# Patient Record
Sex: FEMALE | Race: WHITE | NOT HISPANIC OR LATINO | Employment: OTHER | URBAN - METROPOLITAN AREA
[De-identification: names, ages, dates, MRNs, and addresses within clinical notes are randomized per-mention and may not be internally consistent; named-entity substitution may affect disease eponyms.]

---

## 2018-03-15 ENCOUNTER — OFFICE VISIT (OUTPATIENT)
Dept: URGENT CARE | Age: 68
End: 2018-03-15
Payer: MEDICARE

## 2018-03-15 VITALS
RESPIRATION RATE: 20 BRPM | OXYGEN SATURATION: 97 % | WEIGHT: 153 LBS | HEART RATE: 67 BPM | HEIGHT: 64 IN | DIASTOLIC BLOOD PRESSURE: 79 MMHG | TEMPERATURE: 98.1 F | SYSTOLIC BLOOD PRESSURE: 168 MMHG | BODY MASS INDEX: 26.12 KG/M2

## 2018-03-15 DIAGNOSIS — J30.9 ACUTE ALLERGIC RHINITIS, UNSPECIFIED SEASONALITY, UNSPECIFIED TRIGGER: ICD-10-CM

## 2018-03-15 DIAGNOSIS — J20.9 ACUTE BRONCHITIS, UNSPECIFIED ORGANISM: ICD-10-CM

## 2018-03-15 DIAGNOSIS — J01.00 ACUTE NON-RECURRENT MAXILLARY SINUSITIS: Primary | ICD-10-CM

## 2018-03-15 PROCEDURE — G0463 HOSPITAL OUTPT CLINIC VISIT: HCPCS | Performed by: FAMILY MEDICINE

## 2018-03-15 PROCEDURE — 99213 OFFICE O/P EST LOW 20 MIN: CPT | Performed by: FAMILY MEDICINE

## 2018-03-15 RX ORDER — DOXYCYCLINE 100 MG/1
100 TABLET ORAL 2 TIMES DAILY
Qty: 14 TABLET | Refills: 0 | Status: SHIPPED | OUTPATIENT
Start: 2018-03-15 | End: 2018-03-22

## 2018-03-15 RX ORDER — MEGESTROL ACETATE 40 MG/ML
80 SUSPENSION ORAL
COMMUNITY
End: 2018-03-15

## 2018-03-15 RX ORDER — HYDROCHLOROTHIAZIDE 12.5 MG/1
TABLET ORAL
COMMUNITY
End: 2018-03-15

## 2018-03-15 RX ORDER — METOPROLOL SUCCINATE 25 MG/1
25 TABLET, EXTENDED RELEASE ORAL 2 TIMES DAILY
COMMUNITY
Start: 2018-02-20

## 2018-03-15 NOTE — PATIENT INSTRUCTIONS
Take antibiotic as directed until completed  Take antibiotic with food and full glass of water  Take a probiotic while taking this medication  Begin using an antihistamine such as Claritin, and a nasal steroid like Flonase  Tylenol as directed for muscle aches  Use cool mist humidifier, turning on hours prior to bedtime for maximum relief  Take all medications with food and a full glass of water  Get plenty of rest and lots of fluids  Use throat lozenges, saltwater gargle, and warm honey water as needed for throat relief  If symptoms worsen or if not resolving, call PCP or go to the Er  Allergic Rhinitis   WHAT YOU NEED TO KNOW:   Allergic rhinitis, or hay fever, is swelling of the inside of your nose  The swelling is a reaction to allergens in the air  An allergen can be anything that causes an allergic reaction  Allergies to weeds, grass, trees, or mold often cause seasonal allergic rhinitis  Indoor dust mites, cockroaches, pet dander, or mold can also cause allergic rhinitis  DISCHARGE INSTRUCTIONS:   Call 911 for the following:   · You have chest pain or shortness of breath  Return to the emergency department if:   · You have severe pain  · You cough up blood  Contact your healthcare provider if:   · You have a fever  · You have ear or sinus pain, or a headache  · Your symptoms get worse, even after treatment  · You have yellow, green, brown, or bloody mucus coming from your nose  · Your nose is bleeding or you have pain inside your nose  · You have trouble sleeping because of your symptoms  · You have questions or concerns about your condition or care  Medicines:   · Medicines  help decrease your symptoms and clear your stuffy nose  · Take your medicine as directed  Contact your healthcare provider if you think your medicine is not helping or if you have side effects  Tell him of her if you are allergic to any medicine   Keep a list of the medicines, vitamins, and herbs you take  Include the amounts, and when and why you take them  Bring the list or the pill bottles to follow-up visits  Carry your medicine list with you in case of an emergency  How to manage allergic rhinitis:  The best way to manage allergic rhinitis is to avoid allergens that can trigger your symptoms  Any of the following may help decrease your symptoms:  · Rinse your nose and sinuses  with a salt water solution or use a salt water nasal spray  This will help thin the mucus in your nose and rinse away pollen and dirt  It will also help reduce swelling so you can breathe normally  Ask your healthcare provider how often to rinse your nose  · Reduce exposure to dust mites  Wash sheets and towels in hot water every week  Cover your pillows and mattresses with allergen-free covers  Limit the number of stuffed animals and soft toys your child has  Wash your child's toys in hot water regularly  Vacuum weekly and use a vacuum  with an air filter  If possible, get rid of carpets and curtains  These collect dust and dust mites  · Reduce exposure to pollen  Keep windows and doors closed in your house and car  Stay inside when air pollution or the pollen count is high  Run your air conditioner on recycle, and change air filters often  Shower and wash your hair before bed every night to rinse away pollen  · Reduce exposure to pet dander  If possible, do not keep cats, dogs, birds, or other pets  If you do keep pets in your home, keep them out of bedrooms and carpeted rooms  Bathe them often  · Reduce exposure to mold  Do not spend time in basements  Choose artificial plants instead of live plants  Keep your home's humidity at less than 45%  Do not have ponds or standing water in your home or yard  · Do not smoke  Avoid others who smoke  Ask your healthcare provider for information if you currently smoke and need help to quit  Follow up with your healthcare provider as directed:   You may need to see an allergist often to control your symptoms  Write down your questions so you remember to ask them during your visits  © 2017 [employer] Information is for End User's use only and may not be sold, redistributed or otherwise used for commercial purposes  All illustrations and images included in CareNotes® are the copyrighted property of A D A M [employer] Inc  or Matt Chery  The above information is an  only  It is not intended as medical advice for individual conditions or treatments  Talk to your doctor, nurse or pharmacist before following any medical regimen to see if it is safe and effective for you

## 2018-03-15 NOTE — PROGRESS NOTES
[facility]      NAME: Bertin Lance is a 69 y o  female  : 1950    MRN: [id number]  DATE: March 15, 2018  TIME: 11:28 AM    Assessment and Plan   Acute non-recurrent maxillary sinusitis [J01 00]  1  Acute non-recurrent maxillary sinusitis  doxycycline (ADOXA) 100 MG tablet   2  Acute allergic rhinitis, unspecified seasonality, unspecified trigger     3  Acute bronchitis, unspecified organism       Patient Instructions   Take antibiotic as directed until completed  Take antibiotic with food and full glass of water  Take a probiotic while taking this medication  Begin using an antihistamine such as Claritin, and a nasal steroid like Flonase  Tylenol as directed for muscle aches  Use cool mist humidifier, turning on hours prior to bedtime for maximum relief  Take all medications with food and a full glass of water  Get plenty of rest and lots of fluids  Use throat lozenges, saltwater gargle, and warm honey water as needed for throat relief  If symptoms worsen or if not resolving, call PCP or go to the Er  Follow up with PCP in 3-5 days  Proceed to  ER if symptoms worsen  Chief Complaint     Chief Complaint   Patient presents with    Cold Like Symptoms     x 5 days ears clogged,  cough, sore throat         History of Present Illness   68 y/o female with c/o dry cough x 5 days  Symptoms associated with ear pressure, worse on the left side, nasal congestion, clear nasal drainage, PND, itchy throat, post tussive sore throat, and watery eyes  Treated initially with claritin, with minimal relief  Advil and throat lozenges provide relief of sore throat and itching  Possible trigger includes 2 new cats and 4 kittens  She has had one cat previously without allergies, however, this was many years ago  She notes she feels like this is more than just allergies as symptoms have been progressively worsening over the past 5 days    sick with similar symptoms, treated for allergies and ear infection  No recent travel  No recent antibiotic use  Review of Systems   Review of Systems   Constitutional: Negative for chills, fatigue and fever  HENT: Positive for sneezing  Eyes: Negative for redness and itching  Respiratory: Negative for shortness of breath and wheezing  Cardiovascular: Negative for chest pain and palpitations  Gastrointestinal: Negative for abdominal pain, diarrhea, nausea and vomiting  Musculoskeletal: Negative for myalgias  Skin: Negative for rash  Current Medications       Current Outpatient Prescriptions:     metoprolol succinate (TOPROL-XL) 25 mg 24 hr tablet, Take 25 mg by mouth, Disp: , Rfl:     doxycycline (ADOXA) 100 MG tablet, Take 1 tablet (100 mg total) by mouth 2 (two) times a day for 7 days, Disp: 14 tablet, Rfl: 0    Current Allergies     Allergies as of 03/15/2018 - Reviewed 03/15/2018   Allergen Reaction Noted    Cefixime  09/23/2013    Sulfa antibiotics Other (See Comments) 08/03/2009          The following portions of the patient's history were reviewed and updated as appropriate: allergies, current medications, past family history, past medical history, past social history, past surgical history and problem list      Past Medical History:   Diagnosis Date    Hypertension     Lyme disease        Past Surgical History:   Procedure Laterality Date    HYSTERECTOMY       No family history on file  Medications have been verified  Objective   /79   Pulse 67   Temp 98 1 °F (36 7 °C) (Temporal)   Resp 20   Ht 5' 4" (1 626 m)   Wt 69 4 kg (153 lb)   SpO2 97%   BMI 26 26 kg/m²      Physical Exam     Physical Exam   Constitutional: She is oriented to person, place, and time  She appears well-developed and well-nourished  No distress  HENT:   Head: Normocephalic and atraumatic     Right Ear: Hearing, tympanic membrane, external ear and ear canal normal    Left Ear: Hearing, external ear and ear canal normal  Tympanic membrane is injected  A middle ear effusion is present  Nose: Rhinorrhea present  Mouth/Throat: Uvula is midline and mucous membranes are normal  No oropharyngeal exudate, posterior oropharyngeal edema or posterior oropharyngeal erythema  Eyes: Right eye exhibits no discharge  Left eye exhibits no discharge  Mild palpebral conjunctival irritation b/l  Neck: Neck supple  Cardiovascular: Normal rate, regular rhythm and normal heart sounds  Pulmonary/Chest: Effort normal  No respiratory distress  She has no decreased breath sounds  She has no wheezes  She has no rhonchi  She has no rales  Breath sounds coarse throughout b/l lung fields  Lymphadenopathy:     She has no cervical adenopathy  Neurological: She is alert and oriented to person, place, and time  She has normal reflexes  No cranial nerve deficit  She exhibits normal muscle tone  Coordination normal    Skin: Skin is warm and dry  No rash noted  She is not diaphoretic  Psychiatric: She has a normal mood and affect  Her behavior is normal    Nursing note and vitals reviewed

## 2021-02-24 ENCOUNTER — IMMUNIZATIONS (OUTPATIENT)
Dept: FAMILY MEDICINE CLINIC | Facility: HOSPITAL | Age: 71
End: 2021-02-24

## 2021-02-24 DIAGNOSIS — Z23 ENCOUNTER FOR IMMUNIZATION: Primary | ICD-10-CM

## 2021-02-24 PROCEDURE — 0001A SARS-COV-2 / COVID-19 MRNA VACCINE (PFIZER-BIONTECH) 30 MCG: CPT

## 2021-02-24 PROCEDURE — 91300 SARS-COV-2 / COVID-19 MRNA VACCINE (PFIZER-BIONTECH) 30 MCG: CPT

## 2021-03-18 ENCOUNTER — IMMUNIZATIONS (OUTPATIENT)
Dept: FAMILY MEDICINE CLINIC | Facility: HOSPITAL | Age: 71
End: 2021-03-18

## 2021-03-18 DIAGNOSIS — Z23 ENCOUNTER FOR IMMUNIZATION: Primary | ICD-10-CM

## 2021-03-18 PROCEDURE — 91300 SARS-COV-2 / COVID-19 MRNA VACCINE (PFIZER-BIONTECH) 30 MCG: CPT

## 2021-03-18 PROCEDURE — 0002A SARS-COV-2 / COVID-19 MRNA VACCINE (PFIZER-BIONTECH) 30 MCG: CPT

## 2021-10-15 ENCOUNTER — OFFICE VISIT (OUTPATIENT)
Dept: UROLOGY | Facility: HOSPITAL | Age: 71
End: 2021-10-15
Payer: MEDICARE

## 2021-10-15 VITALS
DIASTOLIC BLOOD PRESSURE: 70 MMHG | SYSTOLIC BLOOD PRESSURE: 130 MMHG | HEART RATE: 62 BPM | BODY MASS INDEX: 24.41 KG/M2 | HEIGHT: 64 IN | WEIGHT: 143 LBS

## 2021-10-15 DIAGNOSIS — N39.0 RECURRENT UTI: Primary | ICD-10-CM

## 2021-10-15 DIAGNOSIS — N13.39 OTHER HYDRONEPHROSIS: ICD-10-CM

## 2021-10-15 PROCEDURE — 99203 OFFICE O/P NEW LOW 30 MIN: CPT | Performed by: UROLOGY

## 2021-10-15 RX ORDER — AMLODIPINE BESYLATE 5 MG/1
5 TABLET ORAL 2 TIMES DAILY
COMMUNITY
Start: 2021-08-25 | End: 2022-08-25

## 2021-10-17 ENCOUNTER — APPOINTMENT (EMERGENCY)
Dept: CT IMAGING | Facility: HOSPITAL | Age: 71
DRG: 661 | End: 2021-10-17
Payer: MEDICARE

## 2021-10-17 ENCOUNTER — HOSPITAL ENCOUNTER (INPATIENT)
Facility: HOSPITAL | Age: 71
LOS: 5 days | Discharge: HOME | DRG: 661 | End: 2021-10-22
Attending: EMERGENCY MEDICINE | Admitting: INTERNAL MEDICINE
Payer: MEDICARE

## 2021-10-17 DIAGNOSIS — N13.39 OTHER HYDRONEPHROSIS: ICD-10-CM

## 2021-10-17 DIAGNOSIS — N39.0 RECURRENT UTI: ICD-10-CM

## 2021-10-17 DIAGNOSIS — N39.0 UTI (URINARY TRACT INFECTION): ICD-10-CM

## 2021-10-17 DIAGNOSIS — R10.9 LEFT FLANK PAIN: ICD-10-CM

## 2021-10-17 DIAGNOSIS — N13.5 UPJ (URETEROPELVIC JUNCTION) OBSTRUCTION: Primary | ICD-10-CM

## 2021-10-17 PROBLEM — N12 PYELONEPHRITIS: Status: ACTIVE | Noted: 2021-10-17

## 2021-10-17 PROBLEM — I10 HYPERTENSION: Status: ACTIVE | Noted: 2021-10-17

## 2021-10-17 PROBLEM — R82.90 ABNORMAL URINALYSIS: Status: ACTIVE | Noted: 2021-10-17

## 2021-10-17 PROBLEM — D72.829 LEUKOCYTOSIS: Status: ACTIVE | Noted: 2021-10-17

## 2021-10-17 LAB
ALBUMIN SERPL BCP-MCNC: 3.1 G/DL (ref 3.5–5)
ALP SERPL-CCNC: 72 U/L (ref 46–116)
ALT SERPL W P-5'-P-CCNC: 11 U/L (ref 12–78)
ANION GAP SERPL CALCULATED.3IONS-SCNC: 10 MMOL/L (ref 4–13)
AST SERPL W P-5'-P-CCNC: 9 U/L (ref 5–45)
BACTERIA UR QL AUTO: ABNORMAL /HPF
BASOPHILS # BLD AUTO: 0.03 THOUSANDS/ΜL (ref 0–0.1)
BASOPHILS NFR BLD AUTO: 0 % (ref 0–1)
BILIRUB DIRECT SERPL-MCNC: 0.3 MG/DL (ref 0–0.2)
BILIRUB SERPL-MCNC: 2 MG/DL (ref 0.2–1)
BILIRUB UR QL STRIP: NEGATIVE
BUN SERPL-MCNC: 11 MG/DL (ref 5–25)
CALCIUM ALBUM COR SERPL-MCNC: 9.6 MG/DL (ref 8.3–10.1)
CALCIUM SERPL-MCNC: 8.9 MG/DL (ref 8.3–10.1)
CHLORIDE SERPL-SCNC: 104 MMOL/L (ref 100–108)
CLARITY UR: ABNORMAL
CO2 SERPL-SCNC: 23 MMOL/L (ref 21–32)
COLOR UR: YELLOW
CREAT SERPL-MCNC: 0.98 MG/DL (ref 0.6–1.3)
EOSINOPHIL # BLD AUTO: 0.01 THOUSAND/ΜL (ref 0–0.61)
EOSINOPHIL NFR BLD AUTO: 0 % (ref 0–6)
ERYTHROCYTE [DISTWIDTH] IN BLOOD BY AUTOMATED COUNT: 13.1 % (ref 11.6–15.1)
GFR SERPL CREATININE-BSD FRML MDRD: 58 ML/MIN/1.73SQ M
GLUCOSE SERPL-MCNC: 164 MG/DL (ref 65–140)
GLUCOSE UR STRIP-MCNC: NEGATIVE MG/DL
HCT VFR BLD AUTO: 35.7 % (ref 34.8–46.1)
HGB BLD-MCNC: 11.4 G/DL (ref 11.5–15.4)
HGB UR QL STRIP.AUTO: ABNORMAL
HOLD SPECIMEN: NORMAL
IMM GRANULOCYTES # BLD AUTO: 0.05 THOUSAND/UL (ref 0–0.2)
IMM GRANULOCYTES NFR BLD AUTO: 0 % (ref 0–2)
INR PPP: 1.26 (ref 0.84–1.19)
KETONES UR STRIP-MCNC: NEGATIVE MG/DL
LEUKOCYTE ESTERASE UR QL STRIP: ABNORMAL
LYMPHOCYTES # BLD AUTO: 0.58 THOUSANDS/ΜL (ref 0.6–4.47)
LYMPHOCYTES NFR BLD AUTO: 5 % (ref 14–44)
MCH RBC QN AUTO: 30 PG (ref 26.8–34.3)
MCHC RBC AUTO-ENTMCNC: 31.9 G/DL (ref 31.4–37.4)
MCV RBC AUTO: 94 FL (ref 82–98)
MONOCYTES # BLD AUTO: 1.02 THOUSAND/ΜL (ref 0.17–1.22)
MONOCYTES NFR BLD AUTO: 9 % (ref 4–12)
NEUTROPHILS # BLD AUTO: 9.55 THOUSANDS/ΜL (ref 1.85–7.62)
NEUTS SEG NFR BLD AUTO: 86 % (ref 43–75)
NITRITE UR QL STRIP: NEGATIVE
NON-SQ EPI CELLS URNS QL MICRO: ABNORMAL /HPF
NRBC BLD AUTO-RTO: 0 /100 WBCS
PH UR STRIP.AUTO: 6 [PH]
PLATELET # BLD AUTO: 230 THOUSANDS/UL (ref 149–390)
PMV BLD AUTO: 9.6 FL (ref 8.9–12.7)
POTASSIUM SERPL-SCNC: 3.6 MMOL/L (ref 3.5–5.3)
PROT SERPL-MCNC: 6.9 G/DL (ref 6.4–8.2)
PROT UR STRIP-MCNC: NEGATIVE MG/DL
PROTHROMBIN TIME: 15.7 SECONDS (ref 11.6–14.5)
RBC # BLD AUTO: 3.8 MILLION/UL (ref 3.81–5.12)
RBC #/AREA URNS AUTO: ABNORMAL /HPF
SODIUM SERPL-SCNC: 137 MMOL/L (ref 136–145)
SP GR UR STRIP.AUTO: 1.01 (ref 1–1.03)
UROBILINOGEN UR QL STRIP.AUTO: 0.2 E.U./DL
WBC # BLD AUTO: 11.24 THOUSAND/UL (ref 4.31–10.16)
WBC #/AREA URNS AUTO: ABNORMAL /HPF

## 2021-10-17 PROCEDURE — 81001 URINALYSIS AUTO W/SCOPE: CPT | Performed by: EMERGENCY MEDICINE

## 2021-10-17 PROCEDURE — 96375 TX/PRO/DX INJ NEW DRUG ADDON: CPT

## 2021-10-17 PROCEDURE — 74177 CT ABD & PELVIS W/CONTRAST: CPT

## 2021-10-17 PROCEDURE — 85610 PROTHROMBIN TIME: CPT | Performed by: RADIOLOGY

## 2021-10-17 PROCEDURE — 1124F ACP DISCUSS-NO DSCNMKR DOCD: CPT | Performed by: UROLOGY

## 2021-10-17 PROCEDURE — 99285 EMERGENCY DEPT VISIT HI MDM: CPT | Performed by: EMERGENCY MEDICINE

## 2021-10-17 PROCEDURE — 87086 URINE CULTURE/COLONY COUNT: CPT

## 2021-10-17 PROCEDURE — 85025 COMPLETE CBC W/AUTO DIFF WBC: CPT | Performed by: EMERGENCY MEDICINE

## 2021-10-17 PROCEDURE — 82248 BILIRUBIN DIRECT: CPT | Performed by: INTERNAL MEDICINE

## 2021-10-17 PROCEDURE — 96361 HYDRATE IV INFUSION ADD-ON: CPT

## 2021-10-17 PROCEDURE — G1004 CDSM NDSC: HCPCS

## 2021-10-17 PROCEDURE — 80053 COMPREHEN METABOLIC PANEL: CPT | Performed by: EMERGENCY MEDICINE

## 2021-10-17 PROCEDURE — 99223 1ST HOSP IP/OBS HIGH 75: CPT | Performed by: INTERNAL MEDICINE

## 2021-10-17 PROCEDURE — 36415 COLL VENOUS BLD VENIPUNCTURE: CPT

## 2021-10-17 PROCEDURE — 96374 THER/PROPH/DIAG INJ IV PUSH: CPT

## 2021-10-17 PROCEDURE — 99447 NTRPROF PH1/NTRNET/EHR 11-20: CPT | Performed by: RADIOLOGY

## 2021-10-17 PROCEDURE — 99285 EMERGENCY DEPT VISIT HI MDM: CPT

## 2021-10-17 RX ORDER — METOPROLOL SUCCINATE 25 MG/1
25 TABLET, EXTENDED RELEASE ORAL 2 TIMES DAILY
Status: DISCONTINUED | OUTPATIENT
Start: 2021-10-17 | End: 2021-10-22 | Stop reason: HOSPADM

## 2021-10-17 RX ORDER — ONDANSETRON 2 MG/ML
4 INJECTION INTRAMUSCULAR; INTRAVENOUS EVERY 6 HOURS PRN
Status: DISCONTINUED | OUTPATIENT
Start: 2021-10-17 | End: 2021-10-22 | Stop reason: HOSPADM

## 2021-10-17 RX ORDER — ACETAMINOPHEN 325 MG/1
975 TABLET ORAL ONCE
Status: COMPLETED | OUTPATIENT
Start: 2021-10-17 | End: 2021-10-17

## 2021-10-17 RX ORDER — HYDROMORPHONE HCL/PF 1 MG/ML
0.5 SYRINGE (ML) INJECTION ONCE
Status: COMPLETED | OUTPATIENT
Start: 2021-10-17 | End: 2021-10-17

## 2021-10-17 RX ORDER — AMLODIPINE BESYLATE 5 MG/1
5 TABLET ORAL 2 TIMES DAILY
Status: DISCONTINUED | OUTPATIENT
Start: 2021-10-17 | End: 2021-10-22 | Stop reason: HOSPADM

## 2021-10-17 RX ORDER — HYDROMORPHONE HCL/PF 1 MG/ML
0.5 SYRINGE (ML) INJECTION EVERY 6 HOURS PRN
Status: DISCONTINUED | OUTPATIENT
Start: 2021-10-17 | End: 2021-10-22 | Stop reason: HOSPADM

## 2021-10-17 RX ORDER — OXYCODONE HYDROCHLORIDE 5 MG/1
5 TABLET ORAL EVERY 4 HOURS PRN
Status: DISCONTINUED | OUTPATIENT
Start: 2021-10-17 | End: 2021-10-22 | Stop reason: HOSPADM

## 2021-10-17 RX ORDER — SODIUM CHLORIDE 9 MG/ML
100 INJECTION, SOLUTION INTRAVENOUS CONTINUOUS
Status: DISPENSED | OUTPATIENT
Start: 2021-10-17 | End: 2021-10-18

## 2021-10-17 RX ORDER — ONDANSETRON 2 MG/ML
4 INJECTION INTRAMUSCULAR; INTRAVENOUS ONCE
Status: COMPLETED | OUTPATIENT
Start: 2021-10-17 | End: 2021-10-17

## 2021-10-17 RX ORDER — FLUTICASONE PROPIONATE 50 MCG
2 SPRAY, SUSPENSION (ML) NASAL DAILY
Status: DISCONTINUED | OUTPATIENT
Start: 2021-10-18 | End: 2021-10-22 | Stop reason: HOSPADM

## 2021-10-17 RX ORDER — CIPROFLOXACIN 500 MG/1
500 TABLET, FILM COATED ORAL ONCE
Status: COMPLETED | OUTPATIENT
Start: 2021-10-17 | End: 2021-10-17

## 2021-10-17 RX ORDER — OXYCODONE HYDROCHLORIDE 10 MG/1
10 TABLET ORAL EVERY 4 HOURS PRN
Status: DISCONTINUED | OUTPATIENT
Start: 2021-10-17 | End: 2021-10-22 | Stop reason: HOSPADM

## 2021-10-17 RX ADMIN — IOHEXOL 100 ML: 350 INJECTION, SOLUTION INTRAVENOUS at 14:32

## 2021-10-17 RX ADMIN — AZTREONAM 2000 MG: 2 INJECTION, POWDER, LYOPHILIZED, FOR SOLUTION INTRAMUSCULAR; INTRAVENOUS at 22:12

## 2021-10-17 RX ADMIN — CIPROFLOXACIN 500 MG: 500 TABLET, COATED ORAL at 13:55

## 2021-10-17 RX ADMIN — SODIUM CHLORIDE 1000 ML: 0.9 INJECTION, SOLUTION INTRAVENOUS at 13:56

## 2021-10-17 RX ADMIN — HYDROMORPHONE HYDROCHLORIDE 0.5 MG: 1 INJECTION, SOLUTION INTRAMUSCULAR; INTRAVENOUS; SUBCUTANEOUS at 16:56

## 2021-10-17 RX ADMIN — SODIUM CHLORIDE 100 ML/HR: 0.9 INJECTION, SOLUTION INTRAVENOUS at 22:20

## 2021-10-17 RX ADMIN — AMLODIPINE BESYLATE 5 MG: 5 TABLET ORAL at 19:49

## 2021-10-17 RX ADMIN — OXYCODONE HYDROCHLORIDE 10 MG: 10 TABLET ORAL at 22:12

## 2021-10-17 RX ADMIN — METOPROLOL SUCCINATE 25 MG: 25 TABLET, EXTENDED RELEASE ORAL at 21:51

## 2021-10-17 RX ADMIN — ACETAMINOPHEN 975 MG: 325 TABLET, FILM COATED ORAL at 13:55

## 2021-10-17 RX ADMIN — ONDANSETRON 4 MG: 2 INJECTION INTRAMUSCULAR; INTRAVENOUS at 13:56

## 2021-10-18 LAB
ALBUMIN SERPL BCP-MCNC: 2.6 G/DL (ref 3.5–5)
ALP SERPL-CCNC: 64 U/L (ref 46–116)
ALT SERPL W P-5'-P-CCNC: 9 U/L (ref 12–78)
ANION GAP SERPL CALCULATED.3IONS-SCNC: 9 MMOL/L (ref 4–13)
AST SERPL W P-5'-P-CCNC: 6 U/L (ref 5–45)
BACTERIA UR CULT: NORMAL
BASOPHILS # BLD AUTO: 0.02 THOUSANDS/ΜL (ref 0–0.1)
BASOPHILS NFR BLD AUTO: 0 % (ref 0–1)
BILIRUB SERPL-MCNC: 1.73 MG/DL (ref 0.2–1)
BUN SERPL-MCNC: 13 MG/DL (ref 5–25)
CALCIUM ALBUM COR SERPL-MCNC: 9.6 MG/DL (ref 8.3–10.1)
CALCIUM SERPL-MCNC: 8.5 MG/DL (ref 8.3–10.1)
CHLORIDE SERPL-SCNC: 107 MMOL/L (ref 100–108)
CO2 SERPL-SCNC: 23 MMOL/L (ref 21–32)
CREAT SERPL-MCNC: 1.01 MG/DL (ref 0.6–1.3)
EOSINOPHIL # BLD AUTO: 0.02 THOUSAND/ΜL (ref 0–0.61)
EOSINOPHIL NFR BLD AUTO: 0 % (ref 0–6)
ERYTHROCYTE [DISTWIDTH] IN BLOOD BY AUTOMATED COUNT: 13.3 % (ref 11.6–15.1)
FERRITIN SERPL-MCNC: 184 NG/ML (ref 8–388)
GFR SERPL CREATININE-BSD FRML MDRD: 56 ML/MIN/1.73SQ M
GLUCOSE SERPL-MCNC: 129 MG/DL (ref 65–140)
HCT VFR BLD AUTO: 31.5 % (ref 34.8–46.1)
HGB BLD-MCNC: 9.8 G/DL (ref 11.5–15.4)
IMM GRANULOCYTES # BLD AUTO: 0.03 THOUSAND/UL (ref 0–0.2)
IMM GRANULOCYTES NFR BLD AUTO: 0 % (ref 0–2)
IRON SATN MFR SERPL: 7 % (ref 15–50)
IRON SERPL-MCNC: 16 UG/DL (ref 50–170)
LDH SERPL-CCNC: 121 U/L (ref 81–234)
LYMPHOCYTES # BLD AUTO: 0.57 THOUSANDS/ΜL (ref 0.6–4.47)
LYMPHOCYTES NFR BLD AUTO: 8 % (ref 14–44)
MCH RBC QN AUTO: 29.8 PG (ref 26.8–34.3)
MCHC RBC AUTO-ENTMCNC: 31.1 G/DL (ref 31.4–37.4)
MCV RBC AUTO: 96 FL (ref 82–98)
MONOCYTES # BLD AUTO: 0.66 THOUSAND/ΜL (ref 0.17–1.22)
MONOCYTES NFR BLD AUTO: 9 % (ref 4–12)
NEUTROPHILS # BLD AUTO: 6.29 THOUSANDS/ΜL (ref 1.85–7.62)
NEUTS SEG NFR BLD AUTO: 83 % (ref 43–75)
NRBC BLD AUTO-RTO: 0 /100 WBCS
PLATELET # BLD AUTO: 200 THOUSANDS/UL (ref 149–390)
PMV BLD AUTO: 9.9 FL (ref 8.9–12.7)
POTASSIUM SERPL-SCNC: 3.8 MMOL/L (ref 3.5–5.3)
PROT SERPL-MCNC: 6.1 G/DL (ref 6.4–8.2)
RBC # BLD AUTO: 3.29 MILLION/UL (ref 3.81–5.12)
SODIUM SERPL-SCNC: 139 MMOL/L (ref 136–145)
TIBC SERPL-MCNC: 216 UG/DL (ref 250–450)
WBC # BLD AUTO: 7.59 THOUSAND/UL (ref 4.31–10.16)

## 2021-10-18 PROCEDURE — 83615 LACTATE (LD) (LDH) ENZYME: CPT | Performed by: INTERNAL MEDICINE

## 2021-10-18 PROCEDURE — 99232 SBSQ HOSP IP/OBS MODERATE 35: CPT | Performed by: INTERNAL MEDICINE

## 2021-10-18 PROCEDURE — 83540 ASSAY OF IRON: CPT | Performed by: INTERNAL MEDICINE

## 2021-10-18 PROCEDURE — 80053 COMPREHEN METABOLIC PANEL: CPT | Performed by: INTERNAL MEDICINE

## 2021-10-18 PROCEDURE — 83550 IRON BINDING TEST: CPT | Performed by: INTERNAL MEDICINE

## 2021-10-18 PROCEDURE — 99232 SBSQ HOSP IP/OBS MODERATE 35: CPT | Performed by: UROLOGY

## 2021-10-18 PROCEDURE — 36415 COLL VENOUS BLD VENIPUNCTURE: CPT | Performed by: INTERNAL MEDICINE

## 2021-10-18 PROCEDURE — 82728 ASSAY OF FERRITIN: CPT | Performed by: INTERNAL MEDICINE

## 2021-10-18 PROCEDURE — 85025 COMPLETE CBC W/AUTO DIFF WBC: CPT | Performed by: INTERNAL MEDICINE

## 2021-10-18 RX ADMIN — OXYCODONE HYDROCHLORIDE 10 MG: 10 TABLET ORAL at 18:10

## 2021-10-18 RX ADMIN — AMLODIPINE BESYLATE 5 MG: 5 TABLET ORAL at 09:38

## 2021-10-18 RX ADMIN — AZTREONAM 2000 MG: 2 INJECTION, POWDER, LYOPHILIZED, FOR SOLUTION INTRAMUSCULAR; INTRAVENOUS at 15:23

## 2021-10-18 RX ADMIN — ONDANSETRON 4 MG: 2 INJECTION INTRAMUSCULAR; INTRAVENOUS at 16:40

## 2021-10-18 RX ADMIN — AZTREONAM 2000 MG: 2 INJECTION, POWDER, LYOPHILIZED, FOR SOLUTION INTRAMUSCULAR; INTRAVENOUS at 21:34

## 2021-10-18 RX ADMIN — METOPROLOL SUCCINATE 25 MG: 25 TABLET, EXTENDED RELEASE ORAL at 09:38

## 2021-10-18 RX ADMIN — METOPROLOL SUCCINATE 25 MG: 25 TABLET, EXTENDED RELEASE ORAL at 18:10

## 2021-10-18 RX ADMIN — AZTREONAM 2000 MG: 2 INJECTION, POWDER, LYOPHILIZED, FOR SOLUTION INTRAMUSCULAR; INTRAVENOUS at 06:18

## 2021-10-18 RX ADMIN — AMLODIPINE BESYLATE 5 MG: 5 TABLET ORAL at 18:10

## 2021-10-19 LAB
ALBUMIN SERPL BCP-MCNC: 2.6 G/DL (ref 3.5–5)
ALP SERPL-CCNC: 66 U/L (ref 46–116)
ALT SERPL W P-5'-P-CCNC: 9 U/L (ref 12–78)
ANION GAP SERPL CALCULATED.3IONS-SCNC: 10 MMOL/L (ref 4–13)
AST SERPL W P-5'-P-CCNC: 11 U/L (ref 5–45)
BASOPHILS # BLD AUTO: 0.02 THOUSANDS/ΜL (ref 0–0.1)
BASOPHILS NFR BLD AUTO: 0 % (ref 0–1)
BILIRUB SERPL-MCNC: 0.96 MG/DL (ref 0.2–1)
BLD SMEAR INTERP: NORMAL
BUN SERPL-MCNC: 14 MG/DL (ref 5–25)
CALCIUM ALBUM COR SERPL-MCNC: 9.9 MG/DL (ref 8.3–10.1)
CALCIUM SERPL-MCNC: 8.8 MG/DL (ref 8.3–10.1)
CHLORIDE SERPL-SCNC: 106 MMOL/L (ref 100–108)
CO2 SERPL-SCNC: 25 MMOL/L (ref 21–32)
CREAT SERPL-MCNC: 0.98 MG/DL (ref 0.6–1.3)
EOSINOPHIL # BLD AUTO: 0.16 THOUSAND/ΜL (ref 0–0.61)
EOSINOPHIL NFR BLD AUTO: 3 % (ref 0–6)
ERYTHROCYTE [DISTWIDTH] IN BLOOD BY AUTOMATED COUNT: 13.3 % (ref 11.6–15.1)
GFR SERPL CREATININE-BSD FRML MDRD: 58 ML/MIN/1.73SQ M
GLUCOSE SERPL-MCNC: 107 MG/DL (ref 65–140)
HCT VFR BLD AUTO: 33.7 % (ref 34.8–46.1)
HGB BLD-MCNC: 10.3 G/DL (ref 11.5–15.4)
IMM GRANULOCYTES # BLD AUTO: 0.04 THOUSAND/UL (ref 0–0.2)
IMM GRANULOCYTES NFR BLD AUTO: 1 % (ref 0–2)
LYMPHOCYTES # BLD AUTO: 0.81 THOUSANDS/ΜL (ref 0.6–4.47)
LYMPHOCYTES NFR BLD AUTO: 16 % (ref 14–44)
MCH RBC QN AUTO: 29.6 PG (ref 26.8–34.3)
MCHC RBC AUTO-ENTMCNC: 30.6 G/DL (ref 31.4–37.4)
MCV RBC AUTO: 97 FL (ref 82–98)
MONOCYTES # BLD AUTO: 0.74 THOUSAND/ΜL (ref 0.17–1.22)
MONOCYTES NFR BLD AUTO: 15 % (ref 4–12)
NEUTROPHILS # BLD AUTO: 3.35 THOUSANDS/ΜL (ref 1.85–7.62)
NEUTS SEG NFR BLD AUTO: 65 % (ref 43–75)
NRBC BLD AUTO-RTO: 0 /100 WBCS
PLATELET # BLD AUTO: 211 THOUSANDS/UL (ref 149–390)
PMV BLD AUTO: 9.9 FL (ref 8.9–12.7)
POTASSIUM SERPL-SCNC: 4 MMOL/L (ref 3.5–5.3)
PROT SERPL-MCNC: 6.3 G/DL (ref 6.4–8.2)
RBC # BLD AUTO: 3.48 MILLION/UL (ref 3.81–5.12)
SODIUM SERPL-SCNC: 141 MMOL/L (ref 136–145)
WBC # BLD AUTO: 5.12 THOUSAND/UL (ref 4.31–10.16)

## 2021-10-19 PROCEDURE — 83010 ASSAY OF HAPTOGLOBIN QUANT: CPT | Performed by: INTERNAL MEDICINE

## 2021-10-19 PROCEDURE — 85025 COMPLETE CBC W/AUTO DIFF WBC: CPT | Performed by: INTERNAL MEDICINE

## 2021-10-19 PROCEDURE — 80053 COMPREHEN METABOLIC PANEL: CPT | Performed by: INTERNAL MEDICINE

## 2021-10-19 PROCEDURE — 99232 SBSQ HOSP IP/OBS MODERATE 35: CPT | Performed by: INTERNAL MEDICINE

## 2021-10-19 PROCEDURE — 99232 SBSQ HOSP IP/OBS MODERATE 35: CPT | Performed by: NURSE PRACTITIONER

## 2021-10-19 RX ADMIN — AZTREONAM 2000 MG: 2 INJECTION, POWDER, LYOPHILIZED, FOR SOLUTION INTRAMUSCULAR; INTRAVENOUS at 13:07

## 2021-10-19 RX ADMIN — METOPROLOL SUCCINATE 25 MG: 25 TABLET, EXTENDED RELEASE ORAL at 18:01

## 2021-10-19 RX ADMIN — AMLODIPINE BESYLATE 5 MG: 5 TABLET ORAL at 09:42

## 2021-10-19 RX ADMIN — OXYCODONE HYDROCHLORIDE 10 MG: 10 TABLET ORAL at 18:06

## 2021-10-19 RX ADMIN — AZTREONAM 2000 MG: 2 INJECTION, POWDER, LYOPHILIZED, FOR SOLUTION INTRAMUSCULAR; INTRAVENOUS at 05:35

## 2021-10-19 RX ADMIN — AMLODIPINE BESYLATE 5 MG: 5 TABLET ORAL at 18:01

## 2021-10-19 RX ADMIN — METOPROLOL SUCCINATE 25 MG: 25 TABLET, EXTENDED RELEASE ORAL at 09:42

## 2021-10-19 RX ADMIN — AZTREONAM 2000 MG: 2 INJECTION, POWDER, LYOPHILIZED, FOR SOLUTION INTRAMUSCULAR; INTRAVENOUS at 21:28

## 2021-10-20 ENCOUNTER — PREP FOR PROCEDURE (OUTPATIENT)
Dept: UROLOGY | Facility: CLINIC | Age: 71
End: 2021-10-20

## 2021-10-20 ENCOUNTER — TELEPHONE (OUTPATIENT)
Dept: UROLOGY | Facility: CLINIC | Age: 71
End: 2021-10-20

## 2021-10-20 DIAGNOSIS — N13.39 OTHER HYDRONEPHROSIS: Primary | ICD-10-CM

## 2021-10-20 LAB — HAPTOGLOB SERPL-MCNC: 362 MG/DL (ref 42–346)

## 2021-10-20 PROCEDURE — 99232 SBSQ HOSP IP/OBS MODERATE 35: CPT | Performed by: INTERNAL MEDICINE

## 2021-10-20 PROCEDURE — 99222 1ST HOSP IP/OBS MODERATE 55: CPT | Performed by: PHYSICIAN ASSISTANT

## 2021-10-20 RX ORDER — SENNOSIDES 8.6 MG
1 TABLET ORAL
Status: DISCONTINUED | OUTPATIENT
Start: 2021-10-20 | End: 2021-10-22 | Stop reason: HOSPADM

## 2021-10-20 RX ORDER — CIPROFLOXACIN 2 MG/ML
400 INJECTION, SOLUTION INTRAVENOUS ONCE
Status: CANCELLED | OUTPATIENT
Start: 2021-10-20 | End: 2021-10-20

## 2021-10-20 RX ADMIN — AMLODIPINE BESYLATE 5 MG: 5 TABLET ORAL at 17:34

## 2021-10-20 RX ADMIN — OXYCODONE HYDROCHLORIDE 10 MG: 10 TABLET ORAL at 01:43

## 2021-10-20 RX ADMIN — METOPROLOL SUCCINATE 25 MG: 25 TABLET, EXTENDED RELEASE ORAL at 17:33

## 2021-10-20 RX ADMIN — AZTREONAM 2000 MG: 2 INJECTION, POWDER, LYOPHILIZED, FOR SOLUTION INTRAMUSCULAR; INTRAVENOUS at 05:32

## 2021-10-20 RX ADMIN — OXYCODONE HYDROCHLORIDE 10 MG: 10 TABLET ORAL at 09:30

## 2021-10-20 RX ADMIN — METOPROLOL SUCCINATE 25 MG: 25 TABLET, EXTENDED RELEASE ORAL at 09:31

## 2021-10-20 RX ADMIN — ONDANSETRON 4 MG: 2 INJECTION INTRAMUSCULAR; INTRAVENOUS at 11:58

## 2021-10-20 RX ADMIN — AMLODIPINE BESYLATE 5 MG: 5 TABLET ORAL at 09:30

## 2021-10-20 RX ADMIN — OXYCODONE HYDROCHLORIDE 10 MG: 10 TABLET ORAL at 15:51

## 2021-10-20 RX ADMIN — SENNOSIDES 8.6 MG: 8.6 TABLET, FILM COATED ORAL at 20:41

## 2021-10-21 ENCOUNTER — ANESTHESIA EVENT (INPATIENT)
Dept: PERIOP | Facility: HOSPITAL | Age: 71
DRG: 661 | End: 2021-10-21
Payer: MEDICARE

## 2021-10-21 ENCOUNTER — APPOINTMENT (INPATIENT)
Dept: RADIOLOGY | Facility: HOSPITAL | Age: 71
DRG: 661 | End: 2021-10-21
Payer: MEDICARE

## 2021-10-21 ENCOUNTER — ANESTHESIA (INPATIENT)
Dept: PERIOP | Facility: HOSPITAL | Age: 71
DRG: 661 | End: 2021-10-21
Payer: MEDICARE

## 2021-10-21 LAB
ANION GAP SERPL CALCULATED.3IONS-SCNC: 9 MMOL/L (ref 4–13)
BASOPHILS # BLD AUTO: 0.02 THOUSANDS/ΜL (ref 0–0.1)
BASOPHILS NFR BLD AUTO: 0 % (ref 0–1)
BUN SERPL-MCNC: 8 MG/DL (ref 5–25)
CALCIUM SERPL-MCNC: 8.7 MG/DL (ref 8.3–10.1)
CHLORIDE SERPL-SCNC: 104 MMOL/L (ref 100–108)
CO2 SERPL-SCNC: 26 MMOL/L (ref 21–32)
CREAT SERPL-MCNC: 0.86 MG/DL (ref 0.6–1.3)
EOSINOPHIL # BLD AUTO: 0.09 THOUSAND/ΜL (ref 0–0.61)
EOSINOPHIL NFR BLD AUTO: 2 % (ref 0–6)
ERYTHROCYTE [DISTWIDTH] IN BLOOD BY AUTOMATED COUNT: 12.8 % (ref 11.6–15.1)
GFR SERPL CREATININE-BSD FRML MDRD: 68 ML/MIN/1.73SQ M
GLUCOSE SERPL-MCNC: 105 MG/DL (ref 65–140)
GLUCOSE SERPL-MCNC: 123 MG/DL (ref 65–140)
HCT VFR BLD AUTO: 33.2 % (ref 34.8–46.1)
HGB BLD-MCNC: 10.4 G/DL (ref 11.5–15.4)
IMM GRANULOCYTES # BLD AUTO: 0.05 THOUSAND/UL (ref 0–0.2)
IMM GRANULOCYTES NFR BLD AUTO: 1 % (ref 0–2)
LYMPHOCYTES # BLD AUTO: 0.93 THOUSANDS/ΜL (ref 0.6–4.47)
LYMPHOCYTES NFR BLD AUTO: 18 % (ref 14–44)
MCH RBC QN AUTO: 29.1 PG (ref 26.8–34.3)
MCHC RBC AUTO-ENTMCNC: 31.3 G/DL (ref 31.4–37.4)
MCV RBC AUTO: 93 FL (ref 82–98)
MONOCYTES # BLD AUTO: 0.78 THOUSAND/ΜL (ref 0.17–1.22)
MONOCYTES NFR BLD AUTO: 15 % (ref 4–12)
NEUTROPHILS # BLD AUTO: 3.22 THOUSANDS/ΜL (ref 1.85–7.62)
NEUTS SEG NFR BLD AUTO: 64 % (ref 43–75)
NRBC BLD AUTO-RTO: 0 /100 WBCS
PLATELET # BLD AUTO: 270 THOUSANDS/UL (ref 149–390)
PMV BLD AUTO: 10.1 FL (ref 8.9–12.7)
POTASSIUM SERPL-SCNC: 3.6 MMOL/L (ref 3.5–5.3)
RBC # BLD AUTO: 3.57 MILLION/UL (ref 3.81–5.12)
SODIUM SERPL-SCNC: 139 MMOL/L (ref 136–145)
WBC # BLD AUTO: 5.09 THOUSAND/UL (ref 4.31–10.16)

## 2021-10-21 PROCEDURE — 0T778DZ DILATION OF LEFT URETER WITH INTRALUMINAL DEVICE, VIA NATURAL OR ARTIFICIAL OPENING ENDOSCOPIC: ICD-10-PCS | Performed by: UROLOGY

## 2021-10-21 PROCEDURE — 52332 CYSTOSCOPY AND TREATMENT: CPT | Performed by: UROLOGY

## 2021-10-21 PROCEDURE — C1769 GUIDE WIRE: HCPCS | Performed by: UROLOGY

## 2021-10-21 PROCEDURE — 74420 UROGRAPHY RTRGR +-KUB: CPT

## 2021-10-21 PROCEDURE — 99233 SBSQ HOSP IP/OBS HIGH 50: CPT | Performed by: PHYSICIAN ASSISTANT

## 2021-10-21 PROCEDURE — 99232 SBSQ HOSP IP/OBS MODERATE 35: CPT | Performed by: UROLOGY

## 2021-10-21 PROCEDURE — C2617 STENT, NON-COR, TEM W/O DEL: HCPCS | Performed by: UROLOGY

## 2021-10-21 PROCEDURE — C1758 CATHETER, URETERAL: HCPCS | Performed by: UROLOGY

## 2021-10-21 PROCEDURE — 82948 REAGENT STRIP/BLOOD GLUCOSE: CPT

## 2021-10-21 PROCEDURE — 85025 COMPLETE CBC W/AUTO DIFF WBC: CPT

## 2021-10-21 PROCEDURE — 99232 SBSQ HOSP IP/OBS MODERATE 35: CPT | Performed by: INTERNAL MEDICINE

## 2021-10-21 PROCEDURE — BT141ZZ FLUOROSCOPY OF KIDNEYS, URETERS AND BLADDER USING LOW OSMOLAR CONTRAST: ICD-10-PCS | Performed by: UROLOGY

## 2021-10-21 PROCEDURE — 80048 BASIC METABOLIC PNL TOTAL CA: CPT

## 2021-10-21 PROCEDURE — 87086 URINE CULTURE/COLONY COUNT: CPT | Performed by: UROLOGY

## 2021-10-21 DEVICE — STENT URETERAL 6 FR 26CM INLAY OPTIMA: Type: IMPLANTABLE DEVICE | Site: URETER | Status: FUNCTIONAL

## 2021-10-21 RX ORDER — ONDANSETRON 2 MG/ML
4 INJECTION INTRAMUSCULAR; INTRAVENOUS ONCE AS NEEDED
Status: DISCONTINUED | OUTPATIENT
Start: 2021-10-21 | End: 2021-10-21 | Stop reason: HOSPADM

## 2021-10-21 RX ORDER — PROPOFOL 10 MG/ML
INJECTION, EMULSION INTRAVENOUS AS NEEDED
Status: DISCONTINUED | OUTPATIENT
Start: 2021-10-21 | End: 2021-10-21

## 2021-10-21 RX ORDER — DEXAMETHASONE SODIUM PHOSPHATE 10 MG/ML
INJECTION, SOLUTION INTRAMUSCULAR; INTRAVENOUS AS NEEDED
Status: DISCONTINUED | OUTPATIENT
Start: 2021-10-21 | End: 2021-10-21

## 2021-10-21 RX ORDER — LACTULOSE 20 G/30ML
20 SOLUTION ORAL 2 TIMES DAILY
Status: DISCONTINUED | OUTPATIENT
Start: 2021-10-21 | End: 2021-10-22 | Stop reason: HOSPADM

## 2021-10-21 RX ORDER — METOCLOPRAMIDE HYDROCHLORIDE 5 MG/ML
INJECTION INTRAMUSCULAR; INTRAVENOUS AS NEEDED
Status: DISCONTINUED | OUTPATIENT
Start: 2021-10-21 | End: 2021-10-21

## 2021-10-21 RX ORDER — SODIUM CHLORIDE, SODIUM LACTATE, POTASSIUM CHLORIDE, CALCIUM CHLORIDE 600; 310; 30; 20 MG/100ML; MG/100ML; MG/100ML; MG/100ML
INJECTION, SOLUTION INTRAVENOUS CONTINUOUS PRN
Status: DISCONTINUED | OUTPATIENT
Start: 2021-10-21 | End: 2021-10-21

## 2021-10-21 RX ORDER — ALBUTEROL SULFATE 2.5 MG/3ML
2.5 SOLUTION RESPIRATORY (INHALATION) ONCE AS NEEDED
Status: DISCONTINUED | OUTPATIENT
Start: 2021-10-21 | End: 2021-10-21 | Stop reason: HOSPADM

## 2021-10-21 RX ORDER — FENTANYL CITRATE 50 UG/ML
INJECTION, SOLUTION INTRAMUSCULAR; INTRAVENOUS AS NEEDED
Status: DISCONTINUED | OUTPATIENT
Start: 2021-10-21 | End: 2021-10-21

## 2021-10-21 RX ORDER — MAGNESIUM HYDROXIDE 1200 MG/15ML
LIQUID ORAL AS NEEDED
Status: DISCONTINUED | OUTPATIENT
Start: 2021-10-21 | End: 2021-10-21 | Stop reason: HOSPADM

## 2021-10-21 RX ORDER — SODIUM PHOSPHATE, DIBASIC AND SODIUM PHOSPHATE, MONOBASIC 7; 19 G/133ML; G/133ML
1 ENEMA RECTAL DAILY PRN
Status: DISCONTINUED | OUTPATIENT
Start: 2021-10-21 | End: 2021-10-22 | Stop reason: HOSPADM

## 2021-10-21 RX ORDER — LIDOCAINE HYDROCHLORIDE 10 MG/ML
INJECTION, SOLUTION EPIDURAL; INFILTRATION; INTRACAUDAL; PERINEURAL AS NEEDED
Status: DISCONTINUED | OUTPATIENT
Start: 2021-10-21 | End: 2021-10-21

## 2021-10-21 RX ORDER — HYDROMORPHONE HCL/PF 1 MG/ML
0.5 SYRINGE (ML) INJECTION
Status: DISCONTINUED | OUTPATIENT
Start: 2021-10-21 | End: 2021-10-21 | Stop reason: HOSPADM

## 2021-10-21 RX ORDER — ONDANSETRON 2 MG/ML
INJECTION INTRAMUSCULAR; INTRAVENOUS AS NEEDED
Status: DISCONTINUED | OUTPATIENT
Start: 2021-10-21 | End: 2021-10-21

## 2021-10-21 RX ORDER — POLYETHYLENE GLYCOL 3350 17 G/17G
17 POWDER, FOR SOLUTION ORAL 2 TIMES DAILY
Status: DISCONTINUED | OUTPATIENT
Start: 2021-10-21 | End: 2021-10-22 | Stop reason: HOSPADM

## 2021-10-21 RX ORDER — BISACODYL 10 MG
10 SUPPOSITORY, RECTAL RECTAL DAILY PRN
Status: DISCONTINUED | OUTPATIENT
Start: 2021-10-21 | End: 2021-10-22 | Stop reason: HOSPADM

## 2021-10-21 RX ORDER — DIPHENHYDRAMINE HYDROCHLORIDE 50 MG/ML
12.5 INJECTION INTRAMUSCULAR; INTRAVENOUS ONCE AS NEEDED
Status: DISCONTINUED | OUTPATIENT
Start: 2021-10-21 | End: 2021-10-21 | Stop reason: HOSPADM

## 2021-10-21 RX ADMIN — SODIUM CHLORIDE, SODIUM LACTATE, POTASSIUM CHLORIDE, AND CALCIUM CHLORIDE: .6; .31; .03; .02 INJECTION, SOLUTION INTRAVENOUS at 16:46

## 2021-10-21 RX ADMIN — METOCLOPRAMIDE HYDROCHLORIDE 5 MG: 5 INJECTION INTRAMUSCULAR; INTRAVENOUS at 17:05

## 2021-10-21 RX ADMIN — FENTANYL CITRATE 25 MCG: 50 INJECTION, SOLUTION INTRAMUSCULAR; INTRAVENOUS at 17:11

## 2021-10-21 RX ADMIN — DEXAMETHASONE SODIUM PHOSPHATE 4 MG: 10 INJECTION, SOLUTION INTRAMUSCULAR; INTRAVENOUS at 16:56

## 2021-10-21 RX ADMIN — METOPROLOL SUCCINATE 25 MG: 25 TABLET, EXTENDED RELEASE ORAL at 09:41

## 2021-10-21 RX ADMIN — AMLODIPINE BESYLATE 5 MG: 5 TABLET ORAL at 18:24

## 2021-10-21 RX ADMIN — LACTULOSE 20 G: 20 SOLUTION ORAL at 09:40

## 2021-10-21 RX ADMIN — LACTULOSE 20 G: 20 SOLUTION ORAL at 18:24

## 2021-10-21 RX ADMIN — PROPOFOL 140 MG: 10 INJECTION, EMULSION INTRAVENOUS at 16:56

## 2021-10-21 RX ADMIN — ONDANSETRON 4 MG: 2 INJECTION INTRAMUSCULAR; INTRAVENOUS at 10:42

## 2021-10-21 RX ADMIN — CEFTRIAXONE SODIUM 1000 MG: 10 INJECTION, POWDER, FOR SOLUTION INTRAVENOUS at 17:01

## 2021-10-21 RX ADMIN — POLYETHYLENE GLYCOL 3350 17 G: 17 POWDER, FOR SOLUTION ORAL at 09:41

## 2021-10-21 RX ADMIN — METOPROLOL SUCCINATE 25 MG: 25 TABLET, EXTENDED RELEASE ORAL at 18:24

## 2021-10-21 RX ADMIN — POLYETHYLENE GLYCOL 3350 17 G: 17 POWDER, FOR SOLUTION ORAL at 20:59

## 2021-10-21 RX ADMIN — OXYCODONE HYDROCHLORIDE 10 MG: 10 TABLET ORAL at 03:36

## 2021-10-21 RX ADMIN — AMLODIPINE BESYLATE 5 MG: 5 TABLET ORAL at 09:41

## 2021-10-21 RX ADMIN — ONDANSETRON 4 MG: 2 INJECTION INTRAMUSCULAR; INTRAVENOUS at 16:56

## 2021-10-21 RX ADMIN — LIDOCAINE HYDROCHLORIDE 50 MG: 10 INJECTION, SOLUTION EPIDURAL; INFILTRATION; INTRACAUDAL at 16:56

## 2021-10-21 RX ADMIN — FENTANYL CITRATE 50 MCG: 50 INJECTION, SOLUTION INTRAMUSCULAR; INTRAVENOUS at 16:56

## 2021-10-22 ENCOUNTER — TELEPHONE (OUTPATIENT)
Dept: UROLOGY | Facility: CLINIC | Age: 71
End: 2021-10-22

## 2021-10-22 VITALS
TEMPERATURE: 97.6 F | SYSTOLIC BLOOD PRESSURE: 132 MMHG | BODY MASS INDEX: 23.82 KG/M2 | DIASTOLIC BLOOD PRESSURE: 69 MMHG | HEIGHT: 65 IN | OXYGEN SATURATION: 96 % | RESPIRATION RATE: 18 BRPM | WEIGHT: 143 LBS | HEART RATE: 57 BPM

## 2021-10-22 DIAGNOSIS — N13.39 OTHER HYDRONEPHROSIS: Primary | ICD-10-CM

## 2021-10-22 LAB
ANION GAP SERPL CALCULATED.3IONS-SCNC: 9 MMOL/L (ref 4–13)
BASOPHILS # BLD AUTO: 0.02 THOUSANDS/ΜL (ref 0–0.1)
BASOPHILS NFR BLD AUTO: 0 % (ref 0–1)
BUN SERPL-MCNC: 10 MG/DL (ref 5–25)
CALCIUM SERPL-MCNC: 8.6 MG/DL (ref 8.3–10.1)
CHLORIDE SERPL-SCNC: 106 MMOL/L (ref 100–108)
CO2 SERPL-SCNC: 27 MMOL/L (ref 21–32)
CREAT SERPL-MCNC: 0.78 MG/DL (ref 0.6–1.3)
EOSINOPHIL # BLD AUTO: 0 THOUSAND/ΜL (ref 0–0.61)
EOSINOPHIL NFR BLD AUTO: 0 % (ref 0–6)
ERYTHROCYTE [DISTWIDTH] IN BLOOD BY AUTOMATED COUNT: 12.8 % (ref 11.6–15.1)
GFR SERPL CREATININE-BSD FRML MDRD: 76 ML/MIN/1.73SQ M
GLUCOSE SERPL-MCNC: 128 MG/DL (ref 65–140)
HCT VFR BLD AUTO: 35.6 % (ref 34.8–46.1)
HGB BLD-MCNC: 11.2 G/DL (ref 11.5–15.4)
IMM GRANULOCYTES # BLD AUTO: 0.07 THOUSAND/UL (ref 0–0.2)
IMM GRANULOCYTES NFR BLD AUTO: 1 % (ref 0–2)
LYMPHOCYTES # BLD AUTO: 0.94 THOUSANDS/ΜL (ref 0.6–4.47)
LYMPHOCYTES NFR BLD AUTO: 18 % (ref 14–44)
MCH RBC QN AUTO: 29 PG (ref 26.8–34.3)
MCHC RBC AUTO-ENTMCNC: 31.5 G/DL (ref 31.4–37.4)
MCV RBC AUTO: 92 FL (ref 82–98)
MONOCYTES # BLD AUTO: 0.4 THOUSAND/ΜL (ref 0.17–1.22)
MONOCYTES NFR BLD AUTO: 8 % (ref 4–12)
NEUTROPHILS # BLD AUTO: 3.7 THOUSANDS/ΜL (ref 1.85–7.62)
NEUTS SEG NFR BLD AUTO: 73 % (ref 43–75)
NRBC BLD AUTO-RTO: 0 /100 WBCS
PLATELET # BLD AUTO: 304 THOUSANDS/UL (ref 149–390)
PMV BLD AUTO: 9 FL (ref 8.9–12.7)
POTASSIUM SERPL-SCNC: 4 MMOL/L (ref 3.5–5.3)
RBC # BLD AUTO: 3.86 MILLION/UL (ref 3.81–5.12)
SODIUM SERPL-SCNC: 142 MMOL/L (ref 136–145)
WBC # BLD AUTO: 5.13 THOUSAND/UL (ref 4.31–10.16)

## 2021-10-22 PROCEDURE — 85025 COMPLETE CBC W/AUTO DIFF WBC: CPT

## 2021-10-22 PROCEDURE — 80048 BASIC METABOLIC PNL TOTAL CA: CPT

## 2021-10-22 PROCEDURE — 99232 SBSQ HOSP IP/OBS MODERATE 35: CPT | Performed by: NURSE PRACTITIONER

## 2021-10-22 PROCEDURE — 99233 SBSQ HOSP IP/OBS HIGH 50: CPT | Performed by: PHYSICIAN ASSISTANT

## 2021-10-22 PROCEDURE — 99239 HOSP IP/OBS DSCHRG MGMT >30: CPT | Performed by: INTERNAL MEDICINE

## 2021-10-22 RX ORDER — OXYCODONE HYDROCHLORIDE 5 MG/1
5 TABLET ORAL EVERY 4 HOURS PRN
Qty: 12 TABLET | Refills: 0 | Status: SHIPPED | OUTPATIENT
Start: 2021-10-22 | End: 2021-10-25

## 2021-10-22 RX ADMIN — POLYETHYLENE GLYCOL 3350 17 G: 17 POWDER, FOR SOLUTION ORAL at 10:20

## 2021-10-22 RX ADMIN — LACTULOSE 20 G: 20 SOLUTION ORAL at 10:16

## 2021-10-22 RX ADMIN — AMLODIPINE BESYLATE 5 MG: 5 TABLET ORAL at 10:15

## 2021-10-22 RX ADMIN — METOPROLOL SUCCINATE 25 MG: 25 TABLET, EXTENDED RELEASE ORAL at 10:15

## 2021-10-23 LAB — BACTERIA UR CULT: NORMAL

## 2021-11-08 ENCOUNTER — HOSPITAL ENCOUNTER (OUTPATIENT)
Dept: NUCLEAR MEDICINE | Facility: HOSPITAL | Age: 71
Discharge: HOME | End: 2021-11-08
Attending: UROLOGY
Payer: MEDICARE

## 2021-11-08 DIAGNOSIS — N13.39 OTHER HYDRONEPHROSIS: ICD-10-CM

## 2021-11-08 PROCEDURE — 78708 K FLOW/FUNCT IMAGE W/DRUG: CPT

## 2021-11-08 PROCEDURE — G1004 CDSM NDSC: HCPCS

## 2021-11-08 PROCEDURE — A9562 TC99M MERTIATIDE: HCPCS

## 2021-11-08 RX ORDER — FUROSEMIDE 10 MG/ML
40 INJECTION INTRAMUSCULAR; INTRAVENOUS ONCE
Status: COMPLETED | OUTPATIENT
Start: 2021-11-08 | End: 2021-11-08

## 2021-11-08 RX ADMIN — FUROSEMIDE 40 MG: 10 INJECTION, SOLUTION INTRAMUSCULAR; INTRAVENOUS at 09:02

## 2021-11-11 ENCOUNTER — OFFICE VISIT (OUTPATIENT)
Dept: UROLOGY | Facility: CLINIC | Age: 71
End: 2021-11-11
Payer: MEDICARE

## 2021-11-11 VITALS
SYSTOLIC BLOOD PRESSURE: 152 MMHG | HEART RATE: 74 BPM | WEIGHT: 138.8 LBS | BODY MASS INDEX: 23.13 KG/M2 | HEIGHT: 65 IN | DIASTOLIC BLOOD PRESSURE: 80 MMHG

## 2021-11-11 DIAGNOSIS — N13.5 UPJ (URETEROPELVIC JUNCTION) OBSTRUCTION: Primary | ICD-10-CM

## 2021-11-11 PROCEDURE — 99214 OFFICE O/P EST MOD 30 MIN: CPT | Performed by: UROLOGY

## 2021-11-11 RX ORDER — CIPROFLOXACIN 2 MG/ML
400 INJECTION, SOLUTION INTRAVENOUS ONCE
Status: CANCELLED | OUTPATIENT
Start: 2021-11-11 | End: 2021-11-11

## 2021-11-12 ENCOUNTER — TELEPHONE (OUTPATIENT)
Dept: UROLOGY | Facility: CLINIC | Age: 71
End: 2021-11-12

## 2021-11-16 ENCOUNTER — PREP FOR PROCEDURE (OUTPATIENT)
Dept: UROLOGY | Facility: CLINIC | Age: 71
End: 2021-11-16

## 2021-11-16 DIAGNOSIS — N13.5 UPJ (URETEROPELVIC JUNCTION) OBSTRUCTION: Primary | ICD-10-CM

## 2022-01-06 ENCOUNTER — PREP FOR PROCEDURE (OUTPATIENT)
Dept: UROLOGY | Facility: CLINIC | Age: 72
End: 2022-01-06

## 2022-01-06 DIAGNOSIS — N13.39 OTHER HYDRONEPHROSIS: ICD-10-CM

## 2022-01-06 DIAGNOSIS — Z01.818 PRE-OP EXAMINATION: Primary | ICD-10-CM

## 2022-01-06 PROCEDURE — U0003 INFECTIOUS AGENT DETECTION BY NUCLEIC ACID (DNA OR RNA); SEVERE ACUTE RESPIRATORY SYNDROME CORONAVIRUS 2 (SARS-COV-2) (CORONAVIRUS DISEASE [COVID-19]), AMPLIFIED PROBE TECHNIQUE, MAKING USE OF HIGH THROUGHPUT TECHNOLOGIES AS DESCRIBED BY CMS-2020-01-R: HCPCS | Performed by: UROLOGY

## 2022-01-10 ENCOUNTER — TELEPHONE (OUTPATIENT)
Dept: OTHER | Facility: HOSPITAL | Age: 72
End: 2022-01-10

## 2022-01-10 ENCOUNTER — TELEPHONE (OUTPATIENT)
Dept: UROLOGY | Facility: CLINIC | Age: 72
End: 2022-01-10

## 2022-01-10 NOTE — TELEPHONE ENCOUNTER
Please contact the patient as her test for COVID is positive for infection  She should contact her primary care physician on guidance and quarantine directives  Her surgery will need to be rescheduled for 4 weeks from the time of infection since it will be performed under IV sedation and not general anesthesia  She will require medical clearance by her primary care physician prior to surgery

## 2022-01-10 NOTE — TELEPHONE ENCOUNTER
Spoke w patient and we have RS her to 2/25 w Dr Sanford Hatch and she will see her PCP on 2/9 at 8:30 for clearance  She is aware we will repeat UCX and I will mail her a new surgical packet  PCP is aware to check on patient due to new covid diagnosis

## 2022-01-10 NOTE — TELEPHONE ENCOUNTER
Patient has been made aware of EDUARDO CAMARGO comments and recommendations from previous encounter  Pt with no further questions at this time

## 2022-02-06 ENCOUNTER — OFFICE VISIT (OUTPATIENT)
Dept: LAB | Facility: CLINIC | Age: 72
End: 2022-02-06
Payer: MEDICARE

## 2022-02-06 DIAGNOSIS — N13.5 UPJ (URETEROPELVIC JUNCTION) OBSTRUCTION: ICD-10-CM

## 2022-02-06 PROCEDURE — 93005 ELECTROCARDIOGRAM TRACING: CPT

## 2022-02-06 PROCEDURE — 87086 URINE CULTURE/COLONY COUNT: CPT

## 2022-02-07 LAB
ATRIAL RATE: 53 BPM
P AXIS: 33 DEGREES
PR INTERVAL: 142 MS
QRS AXIS: 55 DEGREES
QRSD INTERVAL: 132 MS
QT INTERVAL: 436 MS
QTC INTERVAL: 409 MS
T WAVE AXIS: 24 DEGREES
VENTRICULAR RATE: 53 BPM

## 2022-02-07 PROCEDURE — 93010 ELECTROCARDIOGRAM REPORT: CPT | Performed by: INTERNAL MEDICINE

## 2022-02-11 ENCOUNTER — ANESTHESIA EVENT (OUTPATIENT)
Dept: PERIOP | Facility: AMBULARY SURGERY CENTER | Age: 72
End: 2022-02-11
Payer: MEDICARE

## 2022-02-23 NOTE — PRE-PROCEDURE INSTRUCTIONS
Pre-Surgery Instructions:   Medication Instructions    amLODIPine (NORVASC) 5 mg tablet Instructed to take per normal schedule including DOS with sips water    metoprolol succinate (TOPROL-XL) 25 mg 24 hr tablet Instructed to take per normal schedule including DOS with sips water    Pre op,medications and showering instructions reviewed-Patient has hibiclens from office  Instructed to avoid all ASA/NSAIDs and OTC Vit/Supp from now until after surgery per anesthesia guidelines  Tylenol ok prn  Instructed to stop all Vitamins and Supplements over the counter from now until after surgery  Pt  Verbalized an understanding of all instructions reviewed and offers no concerns at this time

## 2022-02-25 ENCOUNTER — ANESTHESIA (OUTPATIENT)
Dept: PERIOP | Facility: AMBULARY SURGERY CENTER | Age: 72
End: 2022-02-25
Payer: MEDICARE

## 2022-02-25 ENCOUNTER — HOSPITAL ENCOUNTER (OUTPATIENT)
Facility: AMBULARY SURGERY CENTER | Age: 72
Setting detail: OUTPATIENT SURGERY
Discharge: HOME | End: 2022-02-25
Attending: UROLOGY | Admitting: UROLOGY
Payer: MEDICARE

## 2022-02-25 ENCOUNTER — TELEPHONE (OUTPATIENT)
Dept: OTHER | Facility: HOSPITAL | Age: 72
End: 2022-02-25

## 2022-02-25 ENCOUNTER — TELEPHONE (OUTPATIENT)
Dept: UROLOGY | Facility: CLINIC | Age: 72
End: 2022-02-25

## 2022-02-25 ENCOUNTER — APPOINTMENT (OUTPATIENT)
Dept: RADIOLOGY | Facility: AMBULARY SURGERY CENTER | Age: 72
End: 2022-02-25
Payer: MEDICARE

## 2022-02-25 VITALS
HEART RATE: 62 BPM | OXYGEN SATURATION: 97 % | BODY MASS INDEX: 23.32 KG/M2 | HEIGHT: 65 IN | WEIGHT: 140 LBS | RESPIRATION RATE: 18 BRPM | SYSTOLIC BLOOD PRESSURE: 138 MMHG | TEMPERATURE: 97.5 F | DIASTOLIC BLOOD PRESSURE: 62 MMHG

## 2022-02-25 DIAGNOSIS — N13.5 UPJ (URETEROPELVIC JUNCTION) OBSTRUCTION: Primary | ICD-10-CM

## 2022-02-25 DIAGNOSIS — N13.39 OTHER HYDRONEPHROSIS: Primary | ICD-10-CM

## 2022-02-25 PROCEDURE — C1769 GUIDE WIRE: HCPCS | Performed by: UROLOGY

## 2022-02-25 PROCEDURE — 74420 UROGRAPHY RTRGR +-KUB: CPT

## 2022-02-25 PROCEDURE — C1758 CATHETER, URETERAL: HCPCS | Performed by: UROLOGY

## 2022-02-25 PROCEDURE — 52351 CYSTOURETERO & OR PYELOSCOPE: CPT | Performed by: UROLOGY

## 2022-02-25 PROCEDURE — C2617 STENT, NON-COR, TEM W/O DEL: HCPCS | Performed by: UROLOGY

## 2022-02-25 PROCEDURE — NC001 PR NO CHARGE: Performed by: UROLOGY

## 2022-02-25 PROCEDURE — 52332 CYSTOSCOPY AND TREATMENT: CPT | Performed by: UROLOGY

## 2022-02-25 DEVICE — STENT URETERAL 6FR 22CM INLAY OPTIMA W/NITINOL GDWR: Type: IMPLANTABLE DEVICE | Status: FUNCTIONAL

## 2022-02-25 RX ORDER — FENTANYL CITRATE 50 UG/ML
INJECTION, SOLUTION INTRAMUSCULAR; INTRAVENOUS AS NEEDED
Status: DISCONTINUED | OUTPATIENT
Start: 2022-02-25 | End: 2022-02-25

## 2022-02-25 RX ORDER — NITROFURANTOIN 25; 75 MG/1; MG/1
100 CAPSULE ORAL 2 TIMES DAILY
Qty: 10 CAPSULE | Refills: 0 | Status: SHIPPED | OUTPATIENT
Start: 2022-02-25 | End: 2022-03-02

## 2022-02-25 RX ORDER — EPHEDRINE SULFATE 50 MG/ML
INJECTION INTRAVENOUS AS NEEDED
Status: DISCONTINUED | OUTPATIENT
Start: 2022-02-25 | End: 2022-02-25

## 2022-02-25 RX ORDER — PHENAZOPYRIDINE HYDROCHLORIDE 200 MG/1
200 TABLET, FILM COATED ORAL 3 TIMES DAILY PRN
Qty: 10 TABLET | Refills: 0 | Status: SHIPPED | OUTPATIENT
Start: 2022-02-25 | End: 2022-02-28

## 2022-02-25 RX ORDER — DEXAMETHASONE SODIUM PHOSPHATE 10 MG/ML
INJECTION, SOLUTION INTRAMUSCULAR; INTRAVENOUS AS NEEDED
Status: DISCONTINUED | OUTPATIENT
Start: 2022-02-25 | End: 2022-02-25

## 2022-02-25 RX ORDER — LIDOCAINE HYDROCHLORIDE 10 MG/ML
INJECTION, SOLUTION EPIDURAL; INFILTRATION; INTRACAUDAL; PERINEURAL AS NEEDED
Status: DISCONTINUED | OUTPATIENT
Start: 2022-02-25 | End: 2022-02-25

## 2022-02-25 RX ORDER — FENTANYL CITRATE/PF 50 MCG/ML
50 SYRINGE (ML) INJECTION
Status: DISCONTINUED | OUTPATIENT
Start: 2022-02-25 | End: 2022-02-25 | Stop reason: HOSPADM

## 2022-02-25 RX ORDER — SODIUM CHLORIDE, SODIUM LACTATE, POTASSIUM CHLORIDE, CALCIUM CHLORIDE 600; 310; 30; 20 MG/100ML; MG/100ML; MG/100ML; MG/100ML
INJECTION, SOLUTION INTRAVENOUS CONTINUOUS PRN
Status: DISCONTINUED | OUTPATIENT
Start: 2022-02-25 | End: 2022-02-25

## 2022-02-25 RX ORDER — MAGNESIUM HYDROXIDE 1200 MG/15ML
LIQUID ORAL AS NEEDED
Status: DISCONTINUED | OUTPATIENT
Start: 2022-02-25 | End: 2022-02-25 | Stop reason: HOSPADM

## 2022-02-25 RX ORDER — ACETAMINOPHEN 325 MG/1
650 TABLET ORAL EVERY 4 HOURS PRN
Qty: 30 TABLET | Refills: 0
Start: 2022-02-25

## 2022-02-25 RX ORDER — ONDANSETRON 2 MG/ML
4 INJECTION INTRAMUSCULAR; INTRAVENOUS ONCE AS NEEDED
Status: DISCONTINUED | OUTPATIENT
Start: 2022-02-25 | End: 2022-02-25 | Stop reason: HOSPADM

## 2022-02-25 RX ORDER — OXYCODONE HYDROCHLORIDE 5 MG/1
5 TABLET ORAL EVERY 4 HOURS PRN
Status: DISCONTINUED | OUTPATIENT
Start: 2022-02-25 | End: 2022-02-25 | Stop reason: HOSPADM

## 2022-02-25 RX ORDER — PROPOFOL 10 MG/ML
INJECTION, EMULSION INTRAVENOUS AS NEEDED
Status: DISCONTINUED | OUTPATIENT
Start: 2022-02-25 | End: 2022-02-25

## 2022-02-25 RX ORDER — CIPROFLOXACIN 2 MG/ML
400 INJECTION, SOLUTION INTRAVENOUS ONCE
Status: COMPLETED | OUTPATIENT
Start: 2022-02-25 | End: 2022-02-25

## 2022-02-25 RX ORDER — ONDANSETRON 2 MG/ML
INJECTION INTRAMUSCULAR; INTRAVENOUS AS NEEDED
Status: DISCONTINUED | OUTPATIENT
Start: 2022-02-25 | End: 2022-02-25

## 2022-02-25 RX ADMIN — FENTANYL CITRATE 25 MCG: 50 INJECTION, SOLUTION INTRAMUSCULAR; INTRAVENOUS at 07:47

## 2022-02-25 RX ADMIN — EPHEDRINE SULFATE 5 MG: 50 INJECTION, SOLUTION INTRAVENOUS at 07:58

## 2022-02-25 RX ADMIN — PROPOFOL 160 MG: 10 INJECTION, EMULSION INTRAVENOUS at 07:31

## 2022-02-25 RX ADMIN — LIDOCAINE HYDROCHLORIDE 50 MG: 10 INJECTION, SOLUTION EPIDURAL; INFILTRATION; INTRACAUDAL at 07:31

## 2022-02-25 RX ADMIN — DEXAMETHASONE SODIUM PHOSPHATE 10 MG: 10 INJECTION, SOLUTION INTRAMUSCULAR; INTRAVENOUS at 07:35

## 2022-02-25 RX ADMIN — ONDANSETRON 4 MG: 2 INJECTION INTRAMUSCULAR; INTRAVENOUS at 07:35

## 2022-02-25 RX ADMIN — FENTANYL CITRATE 25 MCG: 50 INJECTION, SOLUTION INTRAMUSCULAR; INTRAVENOUS at 08:00

## 2022-02-25 RX ADMIN — SODIUM CHLORIDE, SODIUM LACTATE, POTASSIUM CHLORIDE, AND CALCIUM CHLORIDE: .6; .31; .03; .02 INJECTION, SOLUTION INTRAVENOUS at 07:22

## 2022-02-25 RX ADMIN — CIPROFLOXACIN: 2 INJECTION INTRAVENOUS at 07:30

## 2022-02-25 NOTE — ANESTHESIA POSTPROCEDURE EVALUATION
Post-Op Assessment Note    CV Status:  Stable    Pain management: adequate     Mental Status:  Alert and awake   Hydration Status:  Euvolemic   PONV Controlled:  Controlled   Airway Patency:  Patent      Post Op Vitals Reviewed: Yes      Staff: CRNA         No complications documented      BP (P) 138/66 (02/25/22 0811)    Temp (P) 97 6 °F (36 4 °C) (02/25/22 0811)    Pulse (P) 62 (02/25/22 0811)   Resp (P) 16 (02/25/22 0811)    SpO2 (P) 100 % (02/25/22 0811)

## 2022-02-25 NOTE — ANESTHESIA PREPROCEDURE EVALUATION
Procedure:  CYSTOSCOPY RETROGRADE PYELOGRAM WITH INSERTION STENT URETERAL (Left Bladder)    Relevant Problems   CARDIO   (+) Hypertension      /RENAL   (+)  Bilateral Hydronephrosis with left UPJ obstruction   GERD (gastroesophageal reflux disease)     Physical Exam    Airway    Mallampati score: II  TM Distance: >3 FB  Neck ROM: full     Dental       Cardiovascular      Pulmonary      Other Findings        Anesthesia Plan  ASA Score- 2     Anesthesia Type- general with ASA Monitors  Additional Monitors:   Airway Plan: LMA  Plan Factors-    Chart reviewed  Induction- intravenous  Postoperative Plan-     Informed Consent- Anesthetic plan and risks discussed with patient  I personally reviewed this patient with the CRNA  Discussed and agreed on the Anesthesia Plan with the CRNA  Karen Sullivan

## 2022-02-25 NOTE — H&P
UROLOGY HISTORY AND PHYSICAL     Patient Identifiers: Rosi Douglass (MRN [id number])      Date of Service: 2022        ASSESSMENT:     66 y o  old female with  chronic LEFT UPJ obstruction, extensive discussion elected for chronic stent changes  Prevents for left stent echange  PLAN:     Left stent exchange    Will plan for reimaging abnd consideration of converting to a metallic stenty prior to her next surgery      History of Present Illness:     Rosi Douglass is a 66 y o  old with a history of UPJ O    Past Medical, Past Surgical History:     Past Medical History:   Diagnosis Date    Chronic kidney disease     GERD (gastroesophageal reflux disease)     Hypertension     Lyme disease    :    Past Surgical History:   Procedure Laterality Date    FL RETROGRADE PYELOGRAM  10/21/2021    HYSTERECTOMY      AK CYSTOURETHROSCOPY,URETER CATHETER Bilateral 10/21/2021    Procedure: CYSTOSCOPY RETROGRADE PYELOGRAM WITH INSERTION STENT URETERAL;  Surgeon: Maria D Rocha MD;  Location: [facility];  Service: Urology   :    Medications, Allergies:     Current Facility-Administered Medications:     ciprofloxacin (CIPRO) IVPB (premix in 5% dextrose) 400 mg 200 mL, 400 mg, Intravenous, Once, Maria D Rocha MD    Allergies: Allergies   Allergen Reactions    Bactrim [Sulfamethoxazole-Trimethoprim] Other (See Comments)     Her mom  from [address], Family histor?  Cefixime Other (See Comments)     30 years ago, unclear reaction   Believes allergy listed due to C Dif  Tolerated Ceftriaxone 10/21    Sulfa Antibiotics Other (See Comments)     Causes sores on skin - per pt   :    Social and Family History:   Social History:   Social History     Tobacco Use    Smoking status: Never Smoker    Smokeless tobacco: Never Used   Vaping Use    Vaping Use: Never used   Substance Use Topics    Alcohol use: Yes     Comment: socially    Drug use: No        Social History     Tobacco Use   Smoking Status Never Smoker   Smokeless Tobacco Never Used       Family History:  Family History   Problem Relation Age of Onset    Stroke Father    :     Review of Systems:     General: Fever, chills, or night sweats: negative  Cardiac: Negative for chest pain  Pulmonary: Negative for shortness of breath  Gastrointestinal: Abdominal pain negative  Nausea, vomiting, or diarrhea negative  Genitourinary: See HPI above  Patient does nothave hematuria  All other systems queried were negative  Physical Exam:   General: Patient is pleasant and in NAD  Awake and alert  /72   Pulse 57   Temp 98 7 °F (37 1 °C) (Temporal)   Resp 16   Ht 5' 5" (1 651 m)   Wt 63 5 kg (140 lb)   SpO2 99%   BMI 23 30 kg/m²   HEENT:  Normocephalic atraumatic  Cardiac:  Regular rate and rhythm, Peripheral edema: negative  Pulmonary: Non-labored breathing, CTAB  Abdomen: Soft, non-tender, non-distended  No surgical scars  No masses, tenderness, hernias noted  Genitourinary: negative CVA tenderness, neg suprapubic tenderness  Extremities: normal movement in all 4       Labs:     Lab Results   Component Value Date    HGB 11 2 (L) 10/22/2021    HCT 35 6 10/22/2021    WBC 5 13 10/22/2021     10/22/2021   ]    Lab Results   Component Value Date    K 4 0 10/22/2021     10/22/2021    CO2 27 10/22/2021    BUN 10 10/22/2021    CREATININE 0 78 10/22/2021    CALCIUM 8 6 10/22/2021   ]    Imaging:   I personally reviewed the images and report of the following studies, and reviewed them with the patient:        Thank you for allowing me to participate in this patients care  Please do not hesitate to call with any additional questions    Nan Randle MD

## 2022-02-25 NOTE — DISCHARGE INSTRUCTIONS
Gabino Oliveira: Your surgery went very well  Your stent was successfully exchanged  I did look inside of your left kidney with a ureteral scope and confirmed that there were no stones or signs of internal infection  All great news  I did place a different sized stent today, which I expect will be even more comfortable for a going forward  We will plan for your next stent exchange in 6 months time  I will organise a CT scan for prior to the next surgery  If things continue to go well, I will likely plan to place a metallic stent for your next procedure which can stay in place for 1 year prior to exchanging  Please take your medications as prescribed with caution for comfort  Most importantly please drink 6-8 glasses of water per day    Please call with any questions or concerns  Inderjit Tucker MD,PhD  [address] [facility]  [phone]    AFTER THE PROCEDURE  After the procedure you may experience the following symptoms  All of these are normal and should resolve within 1 or 2 days after your stent is removed  1) Urinary frequency (urinating more often than usual)  2) Urinary urgency (the sensation that you need to urinate right away)  3) Painful urination (this can be pain in your bladder or in your back when  you urinate)  4) Blood in your urine ( a stent can irritate the lining of your bladder causing it to bleed)  5) Back/Flank pain, especially with urination  You will receive a prescription for narcotic pain medication after the procedure  You will also receive a prescription for tamsulosin which you will take once a day for 2 weeks to help relax your ureter and decrease stent discomfort  You will also need to purchase a stool softener (i e  Colace) or mild laxative (i e  Miralax) as the narcotic pain medication can make you constipated  This is important as constipation can exacerbate stent related symptoms

## 2022-02-25 NOTE — OP NOTE
Operative Note     PATIENT:  Noemí Martinez (MRN [id number])    DATE OF PROCEDURE:   2/25/2022    PRE-OP DIAGNOSIS:   1) left UPJ obstruction  2  Indwelling left ureteral stent    POST-OP DIAGNOSIS:   1) left UPJ obstruction  2  Indwelling left ureteral stent    PROCEDURES PERFORMED:  1) Cystoscopy  2) Left retrograde pyelography with fluoroscopic interpretation  3) Left ureteroscopy (diagnostic)  4) Left ureteral stent exchange    SURGEON:  Nan Randle MD    NOTE:  There were no qualified teaching residents to assist with this case    ANESTHESIA:  LMA     COMPLICATIONS:   None    ANTIBIOTICS:  Ancef    INTRAOPERATIVE THROMBOEMBOLISM PROPHYLAXIS:  Pneumatic compression stockings     FINDINGS:  - successful stent exchange   -there appeared to be a filling defect within the chronically dilated collecting system at the beginning of the case  As such I elected to perform ureteroscopy in order to ensure that the patient had not developed a stone or a fungus ball  Diagnostic ureteroscopy is completely negative for any concerning findings, the collecting system appears in good shape   -I did elect to downsize the stent based upon remeasure Darrell the distance between the UVJ and the UPJ   -we will plan to continued ureteral stent exchanged I will likely evaluate the patient for conversion to a metallic stent at the time of her next procedure in 6 months    INDICATIONS FOR PROCEDURE:  Noemí Martinez is an 66 y o  old female with a left-sided UPJ obstruction  She was treated recently with ureteral stent placement  We reviewed options extensively and she has elected for chronic ureteral stent exchange  She presents for the above  Of note her surgery has been delayed due to COVID-19 infection  Stent was initially placed on October 21st 2021  After discussing the options, the patient elected to undergo ureteroscopy and ureteral stent placement    We discussed the procedure in detail, the alternatives, and the risks, and they signed informed consent to proceed  PROCEDURE IN DETAIL:   The patient was identified and brought to the OR  Antibiotic prophylaxis and DVT prophylaxis were administered  They were placed in the comfortable dorsal lithotomy position with care to pad all pressure points  They were prepped and draped in the usual sterile fashion using hibiclens  A surgical time out was performed with all in the room in agreement with the correct patient, procedure, indications, and laterality  A 21-[place] rigid cystoscope was used to enter the bladder  The bladder was inspected in its entirety and there were no lesions noted  The ureteral orifices were identified in their normal orthotopic positions  The Left ureteral orifice was identified and a 5 Fr open ended catheter was placed into the ureteral orifice alongside the preplaced ureteral stent which was then removed  A retrograde pyelogram was performed with the findings as described above  I was initially concerned with what appeared to be a filling defect within the chronically dilated collecting system  This appeared to be rather large  I was concerned for potential stone or fungus ball and as such elected to perform ureteroscopy to evaluate  A Sensor wire was advanced up to the kidney under fluoroscopic guidance  Leaving this safety wire in place, the bladder was drained  A second working wire was then placed, and over this a 12/14 ureteral access sheath was sequentially passed under fluoroscopic guidance  A  5 3 [place] flexible ureteroscope was advanced up the ureter under vision   I carefully inspected the entirety of the renal pelvis and all calyceal systems using both video and live fluoroscopic guidance  The inner portion of the kidney is completely normal   No stones or fungus balls were present, finding was likely artifactual secondary to a bubble       The ureteroscope was backed down the ureter under vision and there were no residual fragments and the ureter was noted to be intact with no injury and no edema where the stone was located  A JJ stent was then passed up the wire  under fluoroscopic guidance into the kidney with a good curl noted in the kidney and in the bladder    The bladder was drained  The patient was placed back supine, awakened from general anesthesia and brought to recovery room in stable condition  ESTIMATED BLOOD LOSS:  Minimal      SPECIMENS:   * No orders in the log *     IMPLANTS:   Implant Name Type Inv  Item Serial No   Lot No  LRB No  Used Action   STENT URETERAL 6FR [address] OPTIMA W/NITINOL GDWR - [id number]  STENT URETERAL 6FR 22CM INLAY [facility]IMA W/NITINOL [facility] [address] [facility] [id number] Left 1 Implanted        COMPLICATIONS: None    DISPOSITION: PACU    PLAN:  We will plan the next ureteral stent exchange in approximately 6 months  I will obtain a CT prior to that procedure    Assuming the imaging findings are stable the patient is doing clinically well, I will plan to convert her to a metallic ureteral stent at the time of the next procedure, likely a 6 x 22 cm

## 2022-02-25 NOTE — TELEPHONE ENCOUNTER
Patient is status post successful left ureteral stent exchange  Please schedule her next cystoscopy with left stent exchange in 6 months time        In addition I have ordered her CT scan with and without IV contrast to be performed approximately 1 month prior to the next exchange - please help to schedule    I will plan to utilize a metallic 6 x 22 cm stent at her next procedure    ADEN Lara for stent tracking

## 2022-03-01 ENCOUNTER — NURSE TRIAGE (OUTPATIENT)
Dept: OTHER | Facility: OTHER | Age: 72
End: 2022-03-01

## 2022-03-01 NOTE — TELEPHONE ENCOUNTER
Called and ok 'd Advil for patient  Also advised to use heating pad and increased water intake   No further questions

## 2022-03-01 NOTE — TELEPHONE ENCOUNTER
Patient said she is not a big fan of Tylenol and would like to know if she can take Advil instead  She is having stomach pain s/p surgery ad would like to know if Advil is okay  She said she missed a call from the office this morning and would like a call back

## 2022-03-01 NOTE — TELEPHONE ENCOUNTER
Patient called post stent replacement on 2/25/22  Still has some stomach discomfort/pain  Patient does not tolerate Tylenol  Is it alright to take Advil? Please follow up with patient

## 2022-03-01 NOTE — TELEPHONE ENCOUNTER
Reason for Disposition   Caller has NON-URGENT medicine question about med that PCP or specialist prescribed and triager unable to answer question    Answer Assessment - Initial Assessment Questions  1  NAME of MEDICATION: "What medicine are you calling about?"      Tylenol does not agree with patient and she is having post-op pain  2  QUESTION: "What is your question?" (e g , medication refill, side effect)      Is Advil okay to take instead? 3  PRESCRIBING HCP: "Who prescribed it?" Reason: if prescribed by specialist, call should be referred to that group  Uro, S/P Stent placement  4  SYMPTOMS: "Do you have any symptoms?"      Pain   5  SEVERITY: If symptoms are present, ask "Are they mild, moderate or severe?"      Moderate   6   PREGNANCY:  "Is there any chance that you are pregnant?" "When was your last menstrual period?"      N/A    Protocols used: MEDICATION QUESTION CALL-ADULT-OH

## 2022-03-03 NOTE — TELEPHONE ENCOUNTER
Left second msg asking to pls confirm date of 8/26 for next stent exchange so we can sched her for CT prior

## 2022-03-03 NOTE — TELEPHONE ENCOUNTER
Patient called in regards to ct scan date time  Patient confirmed ct scan for 08/05/22 and stent exchange for 08/26/22

## 2022-05-18 ENCOUNTER — APPOINTMENT (INPATIENT)
Dept: ULTRASOUND IMAGING | Facility: HOSPITAL | Age: 72
DRG: 390 | End: 2022-05-18
Payer: MEDICARE

## 2022-05-18 ENCOUNTER — HOSPITAL ENCOUNTER (INPATIENT)
Facility: HOSPITAL | Age: 72
LOS: 2 days | Discharge: HOME | DRG: 390 | End: 2022-05-20
Attending: EMERGENCY MEDICINE | Admitting: SURGERY
Payer: MEDICARE

## 2022-05-18 ENCOUNTER — APPOINTMENT (EMERGENCY)
Dept: CT IMAGING | Facility: HOSPITAL | Age: 72
DRG: 390 | End: 2022-05-18
Payer: MEDICARE

## 2022-05-18 DIAGNOSIS — K56.600 PARTIAL SMALL BOWEL OBSTRUCTION (HCC): Primary | ICD-10-CM

## 2022-05-18 DIAGNOSIS — K80.20 GALLSTONES: ICD-10-CM

## 2022-05-18 LAB
ALBUMIN SERPL BCP-MCNC: 4 G/DL (ref 3.5–5)
ALP SERPL-CCNC: 66 U/L (ref 34–104)
ALT SERPL W P-5'-P-CCNC: 8 U/L (ref 7–52)
ANION GAP SERPL CALCULATED.3IONS-SCNC: 10 MMOL/L (ref 4–13)
AST SERPL W P-5'-P-CCNC: 13 U/L (ref 13–39)
BASOPHILS # BLD AUTO: 0.03 THOUSANDS/ΜL (ref 0–0.1)
BASOPHILS NFR BLD AUTO: 0 % (ref 0–1)
BILIRUB SERPL-MCNC: 2.23 MG/DL (ref 0.2–1)
BUN SERPL-MCNC: 15 MG/DL (ref 5–25)
CALCIUM SERPL-MCNC: 9.3 MG/DL (ref 8.4–10.2)
CHLORIDE SERPL-SCNC: 103 MMOL/L (ref 96–108)
CO2 SERPL-SCNC: 26 MMOL/L (ref 21–32)
CREAT SERPL-MCNC: 0.73 MG/DL (ref 0.6–1.3)
EOSINOPHIL # BLD AUTO: 0.05 THOUSAND/ΜL (ref 0–0.61)
EOSINOPHIL NFR BLD AUTO: 1 % (ref 0–6)
ERYTHROCYTE [DISTWIDTH] IN BLOOD BY AUTOMATED COUNT: 13.3 % (ref 11.6–15.1)
GFR SERPL CREATININE-BSD FRML MDRD: 81 ML/MIN/1.73SQ M
GLUCOSE SERPL-MCNC: 150 MG/DL (ref 65–140)
HCT VFR BLD AUTO: 43.9 % (ref 34.8–46.1)
HGB BLD-MCNC: 14.4 G/DL (ref 11.5–15.4)
IMM GRANULOCYTES # BLD AUTO: 0.03 THOUSAND/UL (ref 0–0.2)
IMM GRANULOCYTES NFR BLD AUTO: 0 % (ref 0–2)
LACTATE SERPL-SCNC: 0.8 MMOL/L (ref 0.5–2)
LIPASE SERPL-CCNC: 13 U/L (ref 11–82)
LYMPHOCYTES # BLD AUTO: 0.81 THOUSANDS/ΜL (ref 0.6–4.47)
LYMPHOCYTES NFR BLD AUTO: 10 % (ref 14–44)
MCH RBC QN AUTO: 31.2 PG (ref 26.8–34.3)
MCHC RBC AUTO-ENTMCNC: 32.8 G/DL (ref 31.4–37.4)
MCV RBC AUTO: 95 FL (ref 82–98)
MONOCYTES # BLD AUTO: 0.62 THOUSAND/ΜL (ref 0.17–1.22)
MONOCYTES NFR BLD AUTO: 7 % (ref 4–12)
NEUTROPHILS # BLD AUTO: 6.94 THOUSANDS/ΜL (ref 1.85–7.62)
NEUTS SEG NFR BLD AUTO: 82 % (ref 43–75)
NRBC BLD AUTO-RTO: 0 /100 WBCS
PLATELET # BLD AUTO: 238 THOUSANDS/UL (ref 149–390)
PMV BLD AUTO: 9.7 FL (ref 8.9–12.7)
POTASSIUM SERPL-SCNC: 4.3 MMOL/L (ref 3.5–5.3)
PROT SERPL-MCNC: 6.5 G/DL (ref 6.4–8.4)
RBC # BLD AUTO: 4.61 MILLION/UL (ref 3.81–5.12)
SODIUM SERPL-SCNC: 139 MMOL/L (ref 135–147)
WBC # BLD AUTO: 8.48 THOUSAND/UL (ref 4.31–10.16)

## 2022-05-18 PROCEDURE — 99285 EMERGENCY DEPT VISIT HI MDM: CPT | Performed by: EMERGENCY MEDICINE

## 2022-05-18 PROCEDURE — 80053 COMPREHEN METABOLIC PANEL: CPT | Performed by: EMERGENCY MEDICINE

## 2022-05-18 PROCEDURE — 96374 THER/PROPH/DIAG INJ IV PUSH: CPT

## 2022-05-18 PROCEDURE — G1004 CDSM NDSC: HCPCS

## 2022-05-18 PROCEDURE — 99285 EMERGENCY DEPT VISIT HI MDM: CPT

## 2022-05-18 PROCEDURE — C9113 INJ PANTOPRAZOLE SODIUM, VIA: HCPCS | Performed by: SURGERY

## 2022-05-18 PROCEDURE — 99222 1ST HOSP IP/OBS MODERATE 55: CPT | Performed by: SURGERY

## 2022-05-18 PROCEDURE — 74176 CT ABD & PELVIS W/O CONTRAST: CPT

## 2022-05-18 PROCEDURE — 96375 TX/PRO/DX INJ NEW DRUG ADDON: CPT

## 2022-05-18 PROCEDURE — 76705 ECHO EXAM OF ABDOMEN: CPT

## 2022-05-18 PROCEDURE — 96361 HYDRATE IV INFUSION ADD-ON: CPT

## 2022-05-18 PROCEDURE — 36415 COLL VENOUS BLD VENIPUNCTURE: CPT | Performed by: EMERGENCY MEDICINE

## 2022-05-18 PROCEDURE — 83605 ASSAY OF LACTIC ACID: CPT | Performed by: EMERGENCY MEDICINE

## 2022-05-18 PROCEDURE — 85025 COMPLETE CBC W/AUTO DIFF WBC: CPT | Performed by: EMERGENCY MEDICINE

## 2022-05-18 PROCEDURE — 83690 ASSAY OF LIPASE: CPT | Performed by: EMERGENCY MEDICINE

## 2022-05-18 RX ORDER — PANTOPRAZOLE SODIUM 40 MG/1
40 INJECTION, POWDER, FOR SOLUTION INTRAVENOUS EVERY 24 HOURS
Status: DISCONTINUED | OUTPATIENT
Start: 2022-05-18 | End: 2022-05-20 | Stop reason: HOSPADM

## 2022-05-18 RX ORDER — ONDANSETRON 2 MG/ML
4 INJECTION INTRAMUSCULAR; INTRAVENOUS EVERY 6 HOURS PRN
Status: DISCONTINUED | OUTPATIENT
Start: 2022-05-18 | End: 2022-05-20 | Stop reason: HOSPADM

## 2022-05-18 RX ORDER — LABETALOL HYDROCHLORIDE 5 MG/ML
10 INJECTION, SOLUTION INTRAVENOUS EVERY 4 HOURS PRN
Status: DISCONTINUED | OUTPATIENT
Start: 2022-05-18 | End: 2022-05-20 | Stop reason: HOSPADM

## 2022-05-18 RX ORDER — HYDROMORPHONE HCL/PF 1 MG/ML
0.5 SYRINGE (ML) INJECTION ONCE
Status: COMPLETED | OUTPATIENT
Start: 2022-05-18 | End: 2022-05-18

## 2022-05-18 RX ORDER — LORAZEPAM 2 MG/ML
1 INJECTION INTRAMUSCULAR ONCE
Status: COMPLETED | OUTPATIENT
Start: 2022-05-18 | End: 2022-05-18

## 2022-05-18 RX ORDER — HYDRALAZINE HYDROCHLORIDE 20 MG/ML
10 INJECTION INTRAMUSCULAR; INTRAVENOUS EVERY 4 HOURS PRN
Status: DISCONTINUED | OUTPATIENT
Start: 2022-05-18 | End: 2022-05-20 | Stop reason: HOSPADM

## 2022-05-18 RX ORDER — ONDANSETRON 2 MG/ML
4 INJECTION INTRAMUSCULAR; INTRAVENOUS ONCE
Status: COMPLETED | OUTPATIENT
Start: 2022-05-18 | End: 2022-05-18

## 2022-05-18 RX ORDER — HEPARIN SODIUM 5000 [USP'U]/ML
5000 INJECTION, SOLUTION INTRAVENOUS; SUBCUTANEOUS EVERY 8 HOURS SCHEDULED
Status: DISCONTINUED | OUTPATIENT
Start: 2022-05-18 | End: 2022-05-20 | Stop reason: HOSPADM

## 2022-05-18 RX ORDER — HYDROMORPHONE HCL/PF 1 MG/ML
0.5 SYRINGE (ML) INJECTION EVERY 4 HOURS PRN
Status: DISCONTINUED | OUTPATIENT
Start: 2022-05-18 | End: 2022-05-20 | Stop reason: HOSPADM

## 2022-05-18 RX ORDER — SODIUM CHLORIDE, SODIUM GLUCONATE, SODIUM ACETATE, POTASSIUM CHLORIDE, MAGNESIUM CHLORIDE, SODIUM PHOSPHATE, DIBASIC, AND POTASSIUM PHOSPHATE .53; .5; .37; .037; .03; .012; .00082 G/100ML; G/100ML; G/100ML; G/100ML; G/100ML; G/100ML; G/100ML
125 INJECTION, SOLUTION INTRAVENOUS CONTINUOUS
Status: DISCONTINUED | OUTPATIENT
Start: 2022-05-18 | End: 2022-05-19

## 2022-05-18 RX ORDER — HYDROMORPHONE HCL/PF 1 MG/ML
0.6 SYRINGE (ML) INJECTION EVERY 4 HOURS PRN
Status: DISCONTINUED | OUTPATIENT
Start: 2022-05-18 | End: 2022-05-20 | Stop reason: HOSPADM

## 2022-05-18 RX ORDER — HYDROMORPHONE HCL IN WATER/PF 6 MG/30 ML
0.2 PATIENT CONTROLLED ANALGESIA SYRINGE INTRAVENOUS EVERY 4 HOURS PRN
Status: DISCONTINUED | OUTPATIENT
Start: 2022-05-18 | End: 2022-05-20 | Stop reason: HOSPADM

## 2022-05-18 RX ORDER — SODIUM CHLORIDE 9 MG/ML
125 INJECTION, SOLUTION INTRAVENOUS CONTINUOUS
Status: DISCONTINUED | OUTPATIENT
Start: 2022-05-18 | End: 2022-05-18

## 2022-05-18 RX ADMIN — HYDROMORPHONE HYDROCHLORIDE 0.5 MG: 1 INJECTION, SOLUTION INTRAMUSCULAR; INTRAVENOUS; SUBCUTANEOUS at 20:49

## 2022-05-18 RX ADMIN — HEPARIN SODIUM 5000 UNITS: 5000 INJECTION INTRAVENOUS; SUBCUTANEOUS at 21:31

## 2022-05-18 RX ADMIN — SODIUM CHLORIDE 125 ML/HR: 0.9 INJECTION, SOLUTION INTRAVENOUS at 18:10

## 2022-05-18 RX ADMIN — SODIUM CHLORIDE, SODIUM GLUCONATE, SODIUM ACETATE, POTASSIUM CHLORIDE, MAGNESIUM CHLORIDE, SODIUM PHOSPHATE, DIBASIC, AND POTASSIUM PHOSPHATE 125 ML/HR: .53; .5; .37; .037; .03; .012; .00082 INJECTION, SOLUTION INTRAVENOUS at 21:43

## 2022-05-18 RX ADMIN — LORAZEPAM 1 MG: 2 INJECTION INTRAMUSCULAR; INTRAVENOUS at 20:49

## 2022-05-18 RX ADMIN — ONDANSETRON 4 MG: 2 INJECTION INTRAMUSCULAR; INTRAVENOUS at 18:13

## 2022-05-18 RX ADMIN — PANTOPRAZOLE SODIUM 40 MG: 40 INJECTION, POWDER, FOR SOLUTION INTRAVENOUS at 21:31

## 2022-05-18 RX ADMIN — MORPHINE SULFATE 2 MG: 2 INJECTION, SOLUTION INTRAMUSCULAR; INTRAVENOUS at 18:14

## 2022-05-18 NOTE — ED PROVIDER NOTES
History  Chief Complaint   Patient presents with    Abdominal Pain     Patient states she is pretty sure she has a bowel blockage  Co/ pain and nausea  Denies V/D, chest pain, SOB  61-year-old female comes in for evaluation of abdominal pain  Patient states she has had a multiple surgeries in the past and has had multiple small-bowel obstructions in the past   Patient states that today she began to have mid epigastric pain and this feels similar to her prior small bowel obstructions  Patient has some nausea no vomiting no diarrhea no fevers  History provided by:  Patient   used: No    Abdominal Pain  Pain location:  Epigastric  Pain quality: cramping and gnawing    Pain radiates to:  Does not radiate  Pain severity:  Severe  Onset quality:  Sudden  Timing:  Constant  Progression:  Worsening  Chronicity:  Recurrent  Context: previous surgery    Ineffective treatments:  None tried  Associated symptoms: anorexia    Associated symptoms: no chest pain, no cough, no diarrhea, no fatigue, no fever, no hematuria, no shortness of breath and no vomiting    Risk factors: no alcohol abuse, no NSAID use and not pregnant        Prior to Admission Medications   Prescriptions Last Dose Informant Patient Reported?  Taking?   acetaminophen (TYLENOL) 325 mg tablet   No No   Sig: Take 2 tablets (650 mg total) by mouth every 4 (four) hours as needed for mild pain   amLODIPine (NORVASC) 5 mg tablet  Self Yes No   Sig: Take 5 mg by mouth 2 (two) times a day   metoprolol succinate (TOPROL-XL) 25 mg 24 hr tablet  Self Yes No   Sig: Take 25 mg by mouth 2 (two) times a day       Facility-Administered Medications: None       Past Medical History:   Diagnosis Date    Chronic kidney disease     GERD (gastroesophageal reflux disease)     Hypertension     Lyme disease        Past Surgical History:   Procedure Laterality Date    FL RETROGRADE PYELOGRAM  10/21/2021    FL RETROGRADE PYELOGRAM  2/25/2022    HYSTERECTOMY      MI CYSTOURETHROSCOPY,URETER CATHETER Bilateral 10/21/2021    Procedure: CYSTOSCOPY RETROGRADE PYELOGRAM WITH INSERTION STENT URETERAL;  Surgeon: Jamison Newell MD;  [facility];  Service: Urology    MI CYSTOURETHROSCOPY,URETER CATHETER Left 2/25/2022    Procedure: CYSTOSCOPY RETROGRADE PYELOGRAM WITH INSERTION STENT URETERAL; DIAGNOSTIC URETEROSCOPY;  Surgeon: Jamison Newell MD;  [facility];  Service: Urology       Family History   Problem Relation Age of Onset    Stroke Father      I have reviewed and agree with the history as documented  E-Cigarette/Vaping    E-Cigarette Use Never User      E-Cigarette/Vaping Substances     Social History     Tobacco Use    Smoking status: Never Smoker    Smokeless tobacco: Never Used   Vaping Use    Vaping Use: Never used   Substance Use Topics    Alcohol use: Yes     Comment: socially    Drug use: No       Review of Systems   Constitutional: Negative for fatigue and fever  HENT: Negative for congestion and ear pain  Eyes: Negative for discharge and redness  Respiratory: Negative for apnea, cough, shortness of breath and wheezing  Cardiovascular: Negative for chest pain  Gastrointestinal: Positive for abdominal pain and anorexia  Negative for diarrhea and vomiting  Endocrine: Negative for cold intolerance and polydipsia  Genitourinary: Negative for difficulty urinating and hematuria  Musculoskeletal: Negative for arthralgias and back pain  Skin: Negative for color change and rash  Allergic/Immunologic: Negative for environmental allergies and immunocompromised state  Neurological: Negative for numbness and headaches  Hematological: Negative for adenopathy  Does not bruise/bleed easily  Psychiatric/Behavioral: Negative for agitation and behavioral problems  Physical Exam  Physical Exam  Vitals and nursing note reviewed  Constitutional:       Appearance: Normal appearance   She is well-developed  She is not toxic-appearing  HENT:      Head: Normocephalic and atraumatic  Right Ear: Tympanic membrane and external ear normal       Left Ear: Tympanic membrane and external ear normal       Nose: Nose normal  No nasal deformity or rhinorrhea  Mouth/Throat:      Dentition: Normal dentition  Pharynx: Uvula midline  Eyes:      General: Lids are normal          Right eye: No discharge  Left eye: No discharge  Conjunctiva/sclera: Conjunctivae normal       Pupils: Pupils are equal, round, and reactive to light  Neck:      Vascular: No carotid bruit or JVD  Trachea: Trachea normal    Cardiovascular:      Rate and Rhythm: Normal rate and regular rhythm  No extrasystoles are present  Chest Wall: PMI is not displaced  Pulses: Normal pulses  Pulmonary:      Effort: Pulmonary effort is normal  No accessory muscle usage or respiratory distress  Breath sounds: Normal breath sounds  No wheezing, rhonchi or rales  Abdominal:      General: Bowel sounds are decreased  Palpations: Abdomen is soft  Abdomen is not rigid  There is no mass  Tenderness: There is abdominal tenderness in the epigastric area  There is no guarding or rebound  Musculoskeletal:      Right shoulder: No swelling, deformity or bony tenderness  Normal range of motion  Cervical back: Normal range of motion and neck supple  No deformity, tenderness or bony tenderness  Lymphadenopathy:      Cervical: No cervical adenopathy  Skin:     General: Skin is warm and dry  Findings: No rash  Neurological:      Mental Status: She is alert and oriented to person, place, and time  GCS: GCS eye subscore is 4  GCS verbal subscore is 5  GCS motor subscore is 6  Cranial Nerves: No cranial nerve deficit  Sensory: No sensory deficit  Deep Tendon Reflexes: Reflexes are normal and symmetric     Psychiatric:         Speech: Speech normal          Behavior: Behavior normal          Vital Signs  ED Triage Vitals [05/18/22 1656]   Temperature Pulse Respirations Blood Pressure SpO2   97 9 °F (36 6 °C) 65 18 145/68 97 %      Temp Source Heart Rate Source Patient Position - Orthostatic VS BP Location FiO2 (%)   Oral Monitor Lying Left arm --      Pain Score       8           Vitals:    05/18/22 1656   BP: 145/68   Pulse: 65   Patient Position - Orthostatic VS: Lying         Visual Acuity      ED Medications  Medications   sodium chloride 0 9 % infusion (125 mL/hr Intravenous New Bag 5/18/22 1810)   LORazepam (ATIVAN) injection 1 mg (has no administration in time range)   ondansetron (ZOFRAN) injection 4 mg (4 mg Intravenous Given 5/18/22 1813)   morphine injection 2 mg (2 mg Intravenous Given 5/18/22 1814)       Diagnostic Studies  Results Reviewed     Procedure Component Value Units Date/Time    Lactic acid, plasma [[id number]] Collected: 05/18/22 1949    Lab Status:  In process Specimen: Blood from Arm, Right Updated: 05/18/22 1954    Lipase [[id number]]  (Normal) Collected: 05/18/22 1808    Lab Status: Final result Specimen: Blood from Arm, Right Updated: 05/18/22 1834     Lipase 13 u/L     Comprehensive metabolic panel [[id number]]  (Abnormal) Collected: 05/18/22 1808    Lab Status: Final result Specimen: Blood from Arm, Right Updated: 05/18/22 1834     Sodium 139 mmol/L      Potassium 4 3 mmol/L      Chloride 103 mmol/L      CO2 26 mmol/L      ANION GAP 10 mmol/L      BUN 15 mg/dL      Creatinine 0 73 mg/dL      Glucose 150 mg/dL      Calcium 9 3 mg/dL      AST 13 U/L      ALT 8 U/L      Alkaline Phosphatase 66 U/L      Total Protein 6 5 g/dL      Albumin 4 0 g/dL      Total Bilirubin 2 23 mg/dL      eGFR 81 ml/min/1 73sq m     Narrative:      Jomar guidelines for Chronic Kidney Disease (CKD):     Stage 1 with normal or high GFR (GFR > 90 mL/min/1 73 square meters)    Stage 2 Mild CKD (GFR = 60-89 mL/min/1 73 square meters)    Stage 3A Moderate CKD (GFR = 45-59 mL/min/1 73 square meters)    Stage 3B Moderate CKD (GFR = 30-44 mL/min/1 73 square meters)    Stage 4 Severe CKD (GFR = 15-29 mL/min/1 73 square meters)    Stage 5 End Stage CKD (GFR <15 mL/min/1 73 square meters)  Note: GFR calculation is accurate only with a steady state creatinine    CBC and differential [[id number]]  (Abnormal) Collected: 05/18/22 1808    Lab Status: Final result Specimen: Blood from Arm, Right Updated: 05/18/22 1817     WBC 8 48 Thousand/uL      RBC 4 61 Million/uL      Hemoglobin 14 4 g/dL      Hematocrit 43 9 %      MCV 95 fL      MCH 31 2 pg      MCHC 32 8 g/dL      RDW 13 3 %      MPV 9 7 fL      Platelets 810 Thousands/uL      nRBC 0 /100 WBCs      Neutrophils Relative 82 %      Immat GRANS % 0 %      Lymphocytes Relative 10 %      Monocytes Relative 7 %      Eosinophils Relative 1 %      Basophils Relative 0 %      Neutrophils Absolute 6 94 Thousands/µL      Immature Grans Absolute 0 03 Thousand/uL      Lymphocytes Absolute 0 81 Thousands/µL      Monocytes Absolute 0 62 Thousand/µL      Eosinophils Absolute 0 05 Thousand/µL      Basophils Absolute 0 03 Thousands/µL                  CT abdomen pelvis wo contrast   Final Result by Margie Burgos MD (05/18 1910)         1  Moderately dilated proximal and mid small bowel loops, predominately jejunal nature with suspected transition point to nondilated small bowel in the left pelvis consistent with partial small bowel obstruction  2   Small amount of pelvic ascites  3   Left-sided double-J ureteral stent, the proximal loop of which is in the proximal ureter  Previously identified marked left hydronephrosis has resolved  Mild-moderate left renal atrophy  4   Small gallstones with findings somewhat concerning for choledocholithiasis  Correlate with liver function studies  MRI/MRCP could be used for further evaluation as clinically dictated  5   Additional findings as noted        The study was marked in [facility] for immediate notification  5             Workstation performed: [id number]                    Procedures  Procedures         ED Course                                             MDM  Number of Diagnoses or Management Options  Gallstones: new and requires workup  Partial small bowel obstruction [facility]): new and requires workup     Amount and/or Complexity of Data Reviewed  Clinical lab tests: ordered and reviewed  Tests in the radiology section of CPT®: ordered and reviewed  Tests in the medicine section of CPT®: ordered and reviewed  Independent visualization of images, tracings, or specimens: yes        Disposition  Final diagnoses:   Partial small bowel obstruction ([address] )   Gallstones     Time reflects when diagnosis was documented in both MDM as applicable and the Disposition within this note     Time User Action Codes Description Comment    5/18/2022  7:35 PM Derrick WOODS Add [K56 600] Partial small bowel obstruction ([address] )     5/18/2022  7:39 PM Alva Echevarria Add [K80 20] Gallstones       ED Disposition     ED Disposition   Admit    Condition   Stable    Date/Time   Wed May 18, 2022  7:35 PM    Comment   Case was discussed with dr Cathie Joseph and the patient's admission status was agreed to be Admission Status: inpatient status to the service of Dr Katie Medina   Follow-up Information    None         Patient's Medications   Discharge Prescriptions    No medications on file       No discharge procedures on file      PDMP Review     None          ED Provider  Electronically Signed by           Nikki Rashid DO  05/18/22 1956

## 2022-05-19 PROBLEM — K56.609 BOWEL OBSTRUCTION (HCC): Status: ACTIVE | Noted: 2022-05-19

## 2022-05-19 LAB
ALBUMIN SERPL BCP-MCNC: 3.1 G/DL (ref 3.5–5)
ALP SERPL-CCNC: 53 U/L (ref 34–104)
ALT SERPL W P-5'-P-CCNC: 6 U/L (ref 7–52)
ANION GAP SERPL CALCULATED.3IONS-SCNC: 5 MMOL/L (ref 4–13)
AST SERPL W P-5'-P-CCNC: 9 U/L (ref 13–39)
BASOPHILS # BLD AUTO: 0.02 THOUSANDS/ΜL (ref 0–0.1)
BASOPHILS NFR BLD AUTO: 0 % (ref 0–1)
BILIRUB DIRECT SERPL-MCNC: 0.28 MG/DL (ref 0–0.2)
BILIRUB SERPL-MCNC: 2.48 MG/DL (ref 0.2–1)
BUN SERPL-MCNC: 13 MG/DL (ref 5–25)
CA-I BLD-SCNC: 1.09 MMOL/L (ref 1.12–1.32)
CALCIUM ALBUM COR SERPL-MCNC: 8.6 MG/DL (ref 8.3–10.1)
CALCIUM SERPL-MCNC: 7.9 MG/DL (ref 8.4–10.2)
CHLORIDE SERPL-SCNC: 109 MMOL/L (ref 96–108)
CO2 SERPL-SCNC: 25 MMOL/L (ref 21–32)
CREAT SERPL-MCNC: 0.65 MG/DL (ref 0.6–1.3)
EOSINOPHIL # BLD AUTO: 0.09 THOUSAND/ΜL (ref 0–0.61)
EOSINOPHIL NFR BLD AUTO: 2 % (ref 0–6)
ERYTHROCYTE [DISTWIDTH] IN BLOOD BY AUTOMATED COUNT: 13.3 % (ref 11.6–15.1)
GFR SERPL CREATININE-BSD FRML MDRD: 88 ML/MIN/1.73SQ M
GLUCOSE SERPL-MCNC: 128 MG/DL (ref 65–140)
HCT VFR BLD AUTO: 36.2 % (ref 34.8–46.1)
HGB BLD-MCNC: 11.6 G/DL (ref 11.5–15.4)
IMM GRANULOCYTES # BLD AUTO: 0.01 THOUSAND/UL (ref 0–0.2)
IMM GRANULOCYTES NFR BLD AUTO: 0 % (ref 0–2)
LYMPHOCYTES # BLD AUTO: 0.94 THOUSANDS/ΜL (ref 0.6–4.47)
LYMPHOCYTES NFR BLD AUTO: 21 % (ref 14–44)
MAGNESIUM SERPL-MCNC: 1.7 MG/DL (ref 1.9–2.7)
MCH RBC QN AUTO: 31 PG (ref 26.8–34.3)
MCHC RBC AUTO-ENTMCNC: 32 G/DL (ref 31.4–37.4)
MCV RBC AUTO: 97 FL (ref 82–98)
MONOCYTES # BLD AUTO: 0.7 THOUSAND/ΜL (ref 0.17–1.22)
MONOCYTES NFR BLD AUTO: 15 % (ref 4–12)
NEUTROPHILS # BLD AUTO: 2.8 THOUSANDS/ΜL (ref 1.85–7.62)
NEUTS SEG NFR BLD AUTO: 62 % (ref 43–75)
NRBC BLD AUTO-RTO: 0 /100 WBCS
PHOSPHATE SERPL-MCNC: 3.9 MG/DL (ref 2.3–4.1)
PLATELET # BLD AUTO: 179 THOUSANDS/UL (ref 149–390)
PMV BLD AUTO: 10 FL (ref 8.9–12.7)
POTASSIUM SERPL-SCNC: 4.1 MMOL/L (ref 3.5–5.3)
PROT SERPL-MCNC: 5.1 G/DL (ref 6.4–8.4)
RBC # BLD AUTO: 3.74 MILLION/UL (ref 3.81–5.12)
SODIUM SERPL-SCNC: 139 MMOL/L (ref 135–147)
WBC # BLD AUTO: 4.56 THOUSAND/UL (ref 4.31–10.16)

## 2022-05-19 PROCEDURE — 82330 ASSAY OF CALCIUM: CPT | Performed by: SURGERY

## 2022-05-19 PROCEDURE — 80053 COMPREHEN METABOLIC PANEL: CPT | Performed by: SURGERY

## 2022-05-19 PROCEDURE — 99223 1ST HOSP IP/OBS HIGH 75: CPT | Performed by: INTERNAL MEDICINE

## 2022-05-19 PROCEDURE — 85025 COMPLETE CBC W/AUTO DIFF WBC: CPT | Performed by: SURGERY

## 2022-05-19 PROCEDURE — 83735 ASSAY OF MAGNESIUM: CPT | Performed by: SURGERY

## 2022-05-19 PROCEDURE — 82248 BILIRUBIN DIRECT: CPT | Performed by: PHYSICIAN ASSISTANT

## 2022-05-19 PROCEDURE — C9113 INJ PANTOPRAZOLE SODIUM, VIA: HCPCS | Performed by: SURGERY

## 2022-05-19 PROCEDURE — 84100 ASSAY OF PHOSPHORUS: CPT | Performed by: SURGERY

## 2022-05-19 PROCEDURE — 99232 SBSQ HOSP IP/OBS MODERATE 35: CPT | Performed by: SURGERY

## 2022-05-19 RX ORDER — MAGNESIUM SULFATE HEPTAHYDRATE 40 MG/ML
4 INJECTION, SOLUTION INTRAVENOUS ONCE
Status: COMPLETED | OUTPATIENT
Start: 2022-05-19 | End: 2022-05-19

## 2022-05-19 RX ORDER — DEXTROSE, SODIUM CHLORIDE, AND POTASSIUM CHLORIDE 5; .45; .15 G/100ML; G/100ML; G/100ML
75 INJECTION INTRAVENOUS CONTINUOUS
Status: DISCONTINUED | OUTPATIENT
Start: 2022-05-19 | End: 2022-05-20

## 2022-05-19 RX ADMIN — SODIUM CHLORIDE, SODIUM GLUCONATE, SODIUM ACETATE, POTASSIUM CHLORIDE, MAGNESIUM CHLORIDE, SODIUM PHOSPHATE, DIBASIC, AND POTASSIUM PHOSPHATE 125 ML/HR: .53; .5; .37; .037; .03; .012; .00082 INJECTION, SOLUTION INTRAVENOUS at 08:42

## 2022-05-19 RX ADMIN — HEPARIN SODIUM 5000 UNITS: 5000 INJECTION INTRAVENOUS; SUBCUTANEOUS at 15:04

## 2022-05-19 RX ADMIN — HEPARIN SODIUM 5000 UNITS: 5000 INJECTION INTRAVENOUS; SUBCUTANEOUS at 21:37

## 2022-05-19 RX ADMIN — PANTOPRAZOLE SODIUM 40 MG: 40 INJECTION, POWDER, FOR SOLUTION INTRAVENOUS at 21:37

## 2022-05-19 RX ADMIN — HEPARIN SODIUM 5000 UNITS: 5000 INJECTION INTRAVENOUS; SUBCUTANEOUS at 05:32

## 2022-05-19 RX ADMIN — MAGNESIUM SULFATE HEPTAHYDRATE 4 G: 40 INJECTION, SOLUTION INTRAVENOUS at 10:58

## 2022-05-19 RX ADMIN — DEXTROSE, SODIUM CHLORIDE, AND POTASSIUM CHLORIDE 100 ML/HR: 5; .45; .15 INJECTION INTRAVENOUS at 17:59

## 2022-05-19 NOTE — H&P
H&P Exam - Acute Care Surgery   Hailey Silva 66 y o  female MRN: [id number]  Unit/Bed#: FT 01 Encounter: [id number]    Assessment/Plan     Assessment:  65 y/o F w h/o hysterectomy c/b multiple recurrent SBO s/p ex lap ROSCOE 7/21 now presents w n/v, abd pain and recurrent SBO on CT  Also with incidental finding of hyperbilirubinemia and  Possible choledocholithiasis  Vitals stable, abdomen soft, moderate distension, non tender  Palpable supraumbilical hernia fascial defect noted  Labs:   Wbc: 8 48  Lactate: 0 8  Cr: 0 73  Ast/alt: 13/8  Bilirubin: 2 23  Alk phos: 66    Plan:  NPO/ NGT decompression  IVF resuscitation w isoltye @ 125  Pain and nausea control  dvt ppx  Obtain RUQ US to further interrogate CT findings concerning for choledocholithiasis  Trend bilirubin      History of Present Illness   History, ROS and PFSH unobtainable from any source due to none  HPI:  Hailey Silva is a 66 y o  female with past medical history of hysterectomy greater than 20 years ago complicated by recurrent small-bowel obstructions status post exploratory laparotomy lysis of adhesions in July of 2021 at [facility]   Also with 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, CKD, GERD, hypertension  Patient now presents with recurrent small-bowel obstruction, associated with 1 day of nausea, vomiting, abdominal distension and abdominal pain  Abdominal pain started last night it is sharp in nature, comes in waves, band like pain of her lower abdomen  She reports multiple episodes of nausea  Multiple episodes of nonbloody emesis  Her last bowel movement was this morning, 3 small bowel movements  Unsure whether not she has been passing flatus today  CT imaging demonstrating dilated loops of small bowel, transition point in the left hemiabdomen  CT also demonstrating gallstones, no gallbladder wall thickening, no pericholecystic fluid, no biliary ductal dilation    Small calcification in distal common bile duct concerning for choledocholithiasis  Patient denies any history of biliary colic like symptoms  She denied any abdominal pain associated with fatty foods  She felt well for the last year until last night  Denied any current chest pain or shortness of breath  Denied any fevers, chills, night sweats  Review of Systems   Constitutional: Negative for chills and fever  HENT: Negative  Eyes: Negative  Respiratory: Negative  Cardiovascular: Negative  Gastrointestinal: Positive for abdominal distention, abdominal pain, nausea and vomiting  Endocrine: Negative  Genitourinary: Negative  Musculoskeletal: Negative  Skin: Negative  Allergic/Immunologic: Negative  Neurological: Negative  Hematological: Negative  Psychiatric/Behavioral: Negative          Historical Information   Past Medical History:   Diagnosis Date    Chronic kidney disease     GERD (gastroesophageal reflux disease)     Hypertension     Lyme disease      Past Surgical History:   Procedure Laterality Date    FL RETROGRADE PYELOGRAM  10/21/2021    FL RETROGRADE PYELOGRAM  2/25/2022    HYSTERECTOMY      [place] CYSTOURETHROSCOPY,URETER CATHETER Bilateral 10/21/2021    Procedure: CYSTOSCOPY RETROGRADE PYELOGRAM WITH INSERTION STENT URETERAL;  Surgeon: Funmilayo Maurice MD;  [facility];  Service: Urology    AZ CYSTOURETHROSCOPY,URETER CATHETER Left 2/25/2022    Procedure: CYSTOSCOPY RETROGRADE PYELOGRAM WITH INSERTION STENT URETERAL; DIAGNOSTIC URETEROSCOPY;  Surgeon: Funmilayo Maurice MD;  [facility];  Service: Urology     Social History   Social History     Substance and Sexual Activity   Alcohol Use Yes    Comment: socially     Social History     Substance and Sexual Activity   Drug Use No     Social History     Tobacco Use   Smoking Status Never Smoker   Smokeless Tobacco Never Used     E-Cigarette/Vaping    E-Cigarette Use Never User      E-Cigarette/Vaping Substances     Family History: non-contributory    Meds/Allergies   all current active meds have been reviewed  Allergies   Allergen Reactions    Bactrim [Sulfamethoxazole-Trimethoprim] Other (See Comments)     Her mom  from [address], Family histor?  Cefixime Other (See Comments)     30 years ago, unclear reaction  Believes allergy listed due to C Dif  Tolerated Ceftriaxone 10/21    Sulfa Antibiotics Other (See Comments)     Causes sores on skin - per pt       Objective   Vitals: Blood pressure 121/58, pulse 67, temperature 97 9 °F (36 6 °C), temperature source Oral, resp  rate 16, height 5' 5" (1 651 m), weight 63 5 kg (139 lb 15 9 oz), SpO2 92 %  ,Body mass index is 23 3 kg/m²  No intake or output data in the 24 hours ending 22  Invasive Devices  Report    Peripheral Intravenous Line  Duration           Peripheral IV 22 Right Antecubital <1 day                Physical Exam  Vitals reviewed  Constitutional:       General: She is not in acute distress  Appearance: Normal appearance  She is normal weight  She is not toxic-appearing  HENT:      Head: Normocephalic and atraumatic  Nose: Nose normal       Mouth/Throat:      Mouth: Mucous membranes are moist    Eyes:      Pupils: Pupils are equal, round, and reactive to light  Cardiovascular:      Rate and Rhythm: Normal rate  Pulses: Normal pulses  Pulmonary:      Effort: Pulmonary effort is normal    Abdominal:      General: There is distension  Palpations: Abdomen is soft  There is no mass  Tenderness: There is no abdominal tenderness  There is no guarding  Hernia: A hernia is present  Comments: Supraumbilical hernia fascial defect palpable  Lower midline laparotomy scar noted  Abdomen is soft, mild distended  Non tender  Genitourinary:     Comments: deferred  Musculoskeletal:         General: Normal range of motion  Cervical back: Normal range of motion and neck supple     Skin:     General: Skin is warm  Capillary Refill: Capillary refill takes 2 to 3 seconds  Neurological:      General: No focal deficit present  Mental Status: She is alert and oriented to person, place, and time  Psychiatric:         Mood and Affect: Mood normal          Lab Results:   I have personally reviewed pertinent reports  , Coags: No results found for: PT, PTT, INR, Creatinine:   Lab Results   Component Value Date    CREATININE 0 73 05/18/2022   , Lipid Panel: No results found for: CHOL, CBC with diff:   Lab Results   Component Value Date    WBC 8 48 05/18/2022    HGB 14 4 05/18/2022    HCT 43 9 05/18/2022    MCV 95 05/18/2022     05/18/2022    MCH 31 2 05/18/2022    MCHC 32 8 05/18/2022    RDW 13 3 05/18/2022    MPV 9 7 05/18/2022    NRBC 0 05/18/2022   , BMP/CMP:   Lab Results   Component Value Date    SODIUM 139 05/18/2022    K 4 3 05/18/2022     05/18/2022    CO2 26 05/18/2022    BUN 15 05/18/2022    CREATININE 0 73 05/18/2022    CALCIUM 9 3 05/18/2022    AST 13 05/18/2022    ALT 8 05/18/2022    ALKPHOS 66 05/18/2022    EGFR 81 05/18/2022     Imaging: I have personally reviewed pertinent reports  EKG, Pathology, and Other Studies: I have personally reviewed pertinent reports  Code Status: Level 1 - Full Code  Advance Directive and Living Will:      Power of :    POLST:      Counseling / Coordination of Care  Counseling/Coordination of Care: Total floor / unit time spent today 30 minutes  Greater than 50% of total time was spent with the patient and / or family counseling and / or coordination of care   A description of the counseling / coordination of care: 30

## 2022-05-19 NOTE — CONSULTS
Consultation - [address] [facility]  Gaurav Aly 66 y o  female MRN: [id number]  Unit/Bed#: W -52 Encounter: [id number]        Consults    Reason for Consult / Principal Problem:  Abnormal CT    ASSESSMENT and PLAN:      1  Abnormal CT  2  Elevated bilirubin  66year-old with history of recurrent small-bowel obstructions presented with abdominal pain found to have partial small-bowel obstruction being followed by surgery  CT scan showing incidental finding of possible small focal calcification in the vicinity of the distal CBD  Right upper quadrant ultrasound showed CBD of 6 mm without choledocholithiasis  Her bilirubin is mildly elevated at 2 4, mainly indirect with other liver tests normal   Elevated bilirubin possibly in the setting of Gilbert's versus choledocholithiasis    -right upper quadrant ultrasound was without any concerning findings for choledocholithiasis  This may be secondary to Gilbert's syndrome instead as elevation is mainly indirect   -discussed with patient that we could consider non urgent MRI with MRCP to rule out CBD obstruction in the setting of elevated bilirubin however she does not want to have this procedure done unless necessary due to concerns for possible claustrophobia  If necessary, could consider pre imaging medications to help  Either way, non urgent and could be set up in the outpatient setting   -continue to monitor liver function tests   -risks of care per surgery team    -------------------------------------------------------------------------------------------------------------------    HPI:     Gaurav Aly is a 68-year-old female with past medical history of small-bowel obstructions who presented to the emergency room yesterday for abdominal pain with associated nausea  On arrival vital signs are stable  Lipase normal   Mild elevation of bilirubin at 2 23 increasing to 2 48 today    Other liver tests normal   CBC unremarkable with normal hemoglobin  CT scan showing partial small bowel obstruction with cholelithiasis and possible small focal calcification in the vicinity of the distal common bile duct raising concern for choledocholithiasis  Right upper quadrant ultrasound was then performed showing CBD of 6 mm with no choledocholithiasis  She has been seen by surgery with NG tube placed improvement of her abdominal pain  Vital signs stable  Patient reports she started to have some generalized abdominal pain 2 nights ago  She has had history of small-bowel obstructions in the past with last around July of 2021 and do this pain is similar  She reports so she had nausea without vomiting  No fevers or chills  Prior to this she was feeling well  She denies any normal postprandial abdominal pain  She reports her bowel movements have been regular without any change in color blood in the stool  She reports her appetite and weight have also been normal     Reports history of hysterectomy, exploratory laparotomy with lysis of he Gins last year  No family history of GI malignancies  No blood thinners  Reports having colonoscopy recently and is not currently due  Reports she was given a 10 year repeat  REVIEW OF SYSTEMS:    CONSTITUTIONAL: Denies any fever, chills, or rigors  Good appetite, and no recent weight loss  HEENT: No earache or tinnitus  Denies hearing loss or visual disturbances  CARDIOVASCULAR: No chest pain or palpitations  RESPIRATORY: Denies any cough, hemoptysis, shortness of breath or dyspnea on exertion  GASTROINTESTINAL: As noted in the History of Present Illness  GENITOURINARY: No problems with urination  Denies any hematuria or dysuria  NEUROLOGIC: No dizziness or vertigo, denies headaches  MUSCULOSKELETAL: Denies any muscle or joint pain  SKIN: Denies skin rashes or itching  ENDOCRINE: Denies excessive thirst  Denies intolerance to heat or cold  PSYCHOSOCIAL: Denies depression or anxiety  Denies any recent memory loss         Historical Information   Past Medical History:   Diagnosis Date    Chronic kidney disease     GERD (gastroesophageal reflux disease)     Hypertension     Lyme disease      Past Surgical History:   Procedure Laterality Date    FL RETROGRADE PYELOGRAM  10/21/2021    FL RETROGRADE PYELOGRAM  2/25/2022    HYSTERECTOMY      CA CYSTOURETHROSCOPY,URETER CATHETER Bilateral 10/21/2021    Procedure: CYSTOSCOPY RETROGRADE PYELOGRAM WITH INSERTION STENT URETERAL;  Surgeon: Shannon Martins MD;  Location: [facility];  Service: Urology    CA CYSTOURETHROSCOPY,URETER CATHETER Left 2/25/2022    Procedure: CYSTOSCOPY RETROGRADE PYELOGRAM WITH INSERTION STENT URETERAL; DIAGNOSTIC URETEROSCOPY;  Surgeon: Shannon Martins MD;  [facility];  Service: Urology     Social History   Social History     Substance and Sexual Activity   Alcohol Use Yes    Comment: socially     Social History     Substance and Sexual Activity   Drug Use No     Social History     Tobacco Use   Smoking Status Never Smoker   Smokeless Tobacco Never Used     Family History   Problem Relation Age of Onset    Stroke Father        Meds/Allergies     Medications Prior to Admission   Medication    acetaminophen (TYLENOL) 325 mg tablet    amLODIPine (NORVASC) 5 mg tablet    metoprolol succinate (TOPROL-XL) 25 mg 24 hr tablet     Current Facility-Administered Medications   Medication Dose Route Frequency    heparin (porcine) subcutaneous injection 5,000 Units  5,000 Units Subcutaneous Q8H [address]    hydrALAZINE (APRESOLINE) injection 10 mg  10 mg Intravenous Q4H PRN    HYDROmorphone (DILAUDID) injection 0 5 mg  0 5 mg Intravenous Q4H PRN    HYDROmorphone (DILAUDID) injection 0 6 mg  0 6 mg Intravenous Q4H PRN    HYDROmorphone HCl (DILAUDID) injection 0 2 mg  0 2 mg Intravenous Q4H PRN    labetalol (NORMODYNE) injection 10 mg  10 mg Intravenous Q4H PRN    magnesium sulfate 4 g/100 mL IVPB (premix) 4 g 4 g Intravenous Once    multi-electrolyte (PLASMALYTE-A/ISOLYTE-S PH 7 4) IV solution  125 mL/hr Intravenous Continuous    ondansetron (ZOFRAN) injection 4 mg  4 mg Intravenous Q6H PRN    pantoprazole (PROTONIX) injection 40 mg  40 mg Intravenous Q24H       Allergies   Allergen Reactions    Bactrim [Sulfamethoxazole-Trimethoprim] Other (See Comments)     Her mom  from [address], Family histor?  Cefixime Other (See Comments)     30 years ago, unclear reaction  Believes allergy listed due to C Dif  Tolerated Ceftriaxone 10/21    Sulfa Antibiotics Other (See Comments)     Causes sores on skin - per pt           Objective     Blood pressure 133/68, pulse 67, temperature (!) 97 4 °F (36 3 °C), resp  rate 16, height 5' 5" (1 651 m), weight 64 7 kg (142 lb 10 2 oz), SpO2 92 %  Intake/Output Summary (Last 24 hours) at 2022 1030  Last data filed at 2022 0842  Gross per 24 hour   Intake 1372 92 ml   Output --   Net 1372 92 ml         PHYSICAL EXAM:      General Appearance:   Sitting comfortably in bed  Does not appear in distress  NG tube placed with small amount of bilious output noted  Alert and cooperative      HEENT:   Normocephalic, atraumatic, anicteric  Neck:  Supple, symmetrical, trachea midline, no adenopathy;    thyroid: no enlargement/tenderness/nodules; no carotid  bruit or JVD    Lungs:   Clear to auscultation bilaterally; no rales, rhonchi or wheezing; respirations unlabored    Heart[de-identified]   S1 and S2 normal; regular rate and rhythm; no murmur, rub, or gallop  Abdomen:   Benign abdomen  Bowel sounds present  Nontender throughout  No distention     Genitalia:   Deferred    Rectal:   Deferred    Extremities:  No cyanosis, clubbing or edema    Pulses:  2+ and symmetric all extremities    Skin:  Skin color, texture, turgor normal, no rashes or lesions    Lymph nodes:  No palpable cervical, axillary or inguinal lymphadenopathy        Lab Results:   Results from last 7 days   Lab Units 05/19/22  0514   WBC Thousand/uL 4 56   HEMOGLOBIN g/dL 11 6   HEMATOCRIT % 36 2   PLATELETS Thousands/uL 179   NEUTROS PCT % 62   LYMPHS PCT % 21   MONOS PCT % 15*   EOS PCT % 2     Results from last 7 days   Lab Units 05/19/22  0514   POTASSIUM mmol/L 4 1   CHLORIDE mmol/L 109*   CO2 mmol/L 25   BUN mg/dL 13   CREATININE mg/dL 0 65   CALCIUM mg/dL 7 9*   ALK PHOS U/L 53   ALT U/L 6*   AST U/L 9*         Results from last 7 days   Lab Units 05/18/22  1808   LIPASE u/L 13       Imaging Studies: I have personally reviewed pertinent imaging studies  CT abdomen pelvis wo contrast    Result Date: 5/18/2022  Impression: 1  Moderately dilated proximal and mid small bowel loops, predominately jejunal nature with suspected transition point to nondilated small bowel in the left pelvis consistent with partial small bowel obstruction  2   Small amount of pelvic ascites  3   Left-sided double-J ureteral stent, the proximal loop of which is in the proximal ureter  Previously identified marked left hydronephrosis has resolved  Mild-moderate left renal atrophy  4   Small gallstones with findings somewhat concerning for choledocholithiasis  Correlate with liver function studies  MRI/MRCP could be used for further evaluation as clinically dictated  5   Additional findings as noted  The study was marked in [facility] for immediate notification  5  Workstation performed: [id number]     US right upper quadrant    Result Date: 5/19/2022  Impression: Cholelithiasis without findings of acute cholecystitis  No biliary ductal dilatation; no choledocholithiasis identified  Workstation performed: [id number]           Patient was seen and examined by Dr Rakesh Godwin  All lópez medical decisions were made by Dr Rakesh Godwin  Thank you for allowing us to participate in the care of this present patient  We will follow-up with you closely

## 2022-05-19 NOTE — PLAN OF CARE
Problem: MOBILITY - ADULT  Goal: Maintain or return to baseline ADL function  Description: INTERVENTIONS:  -  Assess patient's ability to carry out ADLs; assess patient's baseline for ADL function and identify physical deficits which impact ability to perform ADLs (bathing, care of mouth/teeth, toileting, grooming, dressing, etc )  - Assess/evaluate cause of self-care deficits   - Assess range of motion  - Assess patient's mobility; develop plan if impaired  - Assess patient's need for assistive devices and provide as appropriate  - Encourage maximum independence but intervene and supervise when necessary  - Involve family in performance of ADLs  - Assess for home care needs following discharge   - Consider OT consult to assist with ADL evaluation and planning for discharge  - Provide patient education as appropriate  Outcome: Progressing     Problem: MOBILITY - ADULT  Goal: Maintains/Returns to pre admission functional level  Description: INTERVENTIONS:  - Perform BMAT or MOVE assessment daily    - Set and communicate daily mobility goal to care team and patient/family/caregiver  - Collaborate with rehabilitation services on mobility goals if consulted  - Perform Range of Motion  times a day  - Reposition patient every  hours    - Dangle patient  times a day  - Stand patient  times a day  - Ambulate patient  times a day  - Out of bed to chair  times a day   - Out of bed for meals times a day  - Out of bed for toileting  - Record patient progress and toleration of activity level   Outcome: Progressing

## 2022-05-19 NOTE — PROGRESS NOTES
Progress Note - Castillo Beltrán 66 y o  female MRN: [id number]    Unit/Bed#: W -99 Encounter: [id number]      Assessment:  65 y/o F w h/o hysterectomy, recurrent SBO s/p ex lap ROSCOE 7/21  Now p/w recurrent SBO  Vss  Afebrile  Abd soft, nontender, non distended  NGT: 500 cc output  Plan:  Continue npo  Continue ngt  isolyte @ 125  dvt ppx  Wait return of bowel fuction  F/u bili lfts  GI consult  F/u RUQ ultrasound  Subjective:   Feels better today  abd distension improved since ngt placement  Denied abd pain this am  Denied fever, chills or night sweats  Objective:     Vitals: Blood pressure 133/68, pulse 67, temperature (!) 97 4 °F (36 3 °C), resp  rate 16, height 5' 5" (1 651 m), weight 64 7 kg (142 lb 10 2 oz), SpO2 92 %  ,Body mass index is 23 74 kg/m²  No intake or output data in the 24 hours ending 05/19/22 0729    Physical Exam  General: NAD  HEENT: NC/AT  MMM  Cv: RRR     Lungs: normal effort  Ab: Soft, NT/ND  Ex: no CCE  Neuro: AAOx3    Scheduled Meds:  Current Facility-Administered Medications   Medication Dose Route Frequency Provider Last Rate    heparin (porcine)  5,000 Units Subcutaneous [facility] Austin Sagastume MD      hydrALAZINE  10 mg Intravenous Q4H PRN Austin Sagastume MD      HYDROmorphone  0 5 mg Intravenous Q4H PRN Austin Sagastume MD      HYDROmorphone  0 6 mg Intravenous Q4H PRN Austin Sagastume MD      HYDROmorphone  0 2 mg Intravenous Q4H PRN Austin Sagastume MD      labetalol  10 mg Intravenous Q4H PRN Austin Sagastume MD      multi-electrolyte  125 mL/hr Intravenous Continuous Austin Sagastume  mL/hr (05/18/22 2143)    ondansetron  4 mg Intravenous Q6H PRN Austin Sagastume MD      pantoprazole  40 mg Intravenous Q24H Austin Sagastume MD       Continuous Infusions:multi-electrolyte, 125 mL/hr, Last Rate: 125 mL/hr (05/18/22 2143)      PRN Meds:   hydrALAZINE    HYDROmorphone    HYDROmorphone    HYDROmorphone    labetalol    ondansetron      Invasive Devices  Report    Peripheral Intravenous Line  Duration           Peripheral IV 05/18/22 Right Antecubital <1 day          Drain  Duration           NG/OG/Enteral Tube Nasogastric 18 Fr Left nare <1 day                Lab, Imaging and other studies: I have personally reviewed pertinent reports      VTE Pharmacologic Prophylaxis: Sequential compression device (Venodyne)   VTE Mechanical Prophylaxis: sequential compression device

## 2022-05-19 NOTE — CASE MANAGEMENT
Case Management Assessment & Discharge Planning Note    Patient name Arias Nassar  Location W /W -42 MRN [id number]  : 1950 Date 2022       Current Admission Date: 2022  Current Admission Diagnosis:Bowel obstruction [facility])   Patient Active Problem List    Diagnosis Date Noted    Bowel obstruction ([address] ) 2022    Hypertension 10/17/2021    Abnormal urinalysis 10/17/2021    Leukocytosis 10/17/2021    Recurrent UTI 10/15/2021     Bilateral Hydronephrosis with left UPJ obstruction 10/15/2021      LOS (days): 1  Geometric Mean LOS (GMLOS) (days): 2 40  Days to GMLOS:1 5     OBJECTIVE:    Risk of Unplanned Readmission Score: 10 66         Current admission status: Inpatient       Preferred Pharmacy:   [address] [place] [address]  [facility] [address] [place] [address]  Phone: [phone] Fax: [phone]    Primary Care Provider: Jus Anne DO    Primary Insurance: MEDICARE  Secondary Insurance: BLUE CROSS    ASSESSMENT:  You 24 Proxies    There are no active Health Care Proxies on file  Patient Information  Admitted from[de-identified] Home  Mental Status: Alert  Assessment information provided by[de-identified] Patient  Primary Caregiver: Self  Support Systems: Spouse/significant other  Home entry access options   Select all that apply : Stairs  Number of steps to enter home : 2  Do the steps have railings?: Yes  Type of Current Residence: [address] [place] [address] home  Upon entering residence, is there a bedroom on the main floor (no further steps)?: Yes  Upon entering residence, is there a bathroom on the main floor (no further steps)?: Yes  Living Arrangements: Lives w/ Spouse/significant other    Activities of Daily Living Prior to Admission  Functional Status: Independent  Completes ADLs independently?: Yes  Ambulates independently?: Yes  Does patient use assisted devices?: No  Does patient currently own DME?: No  Does patient have a history of Outpatient Therapy (PT/OT)?: No  Does the patient have a history of Short-Term Rehab?: No  Does patient have a history of HHC?: No  Does patient currently have [address]?: No         Patient Information Continued  Income Source: Pension/intermediate  Does patient have prescription coverage?: Yes  Does patient receive dialysis treatments?: No  Does patient have a history of substance abuse?: No  Does patient have a history of Mental Health Diagnosis?: No         Means of Transportation  Means of Transport to Appts[de-identified] Drives Self  Was application for public transport provided?: N/A        DISCHARGE DETAILS:    Discharge planning discussed with[de-identified] patient        CM contacted family/caregiver?: Yes                  [facility]         Is the patient interested in [address] at discharge?: No    DME Referral Provided  Referral made for DME?: No

## 2022-05-20 VITALS
TEMPERATURE: 98.2 F | DIASTOLIC BLOOD PRESSURE: 70 MMHG | BODY MASS INDEX: 23.76 KG/M2 | RESPIRATION RATE: 17 BRPM | HEIGHT: 65 IN | HEART RATE: 72 BPM | WEIGHT: 142.64 LBS | SYSTOLIC BLOOD PRESSURE: 135 MMHG | OXYGEN SATURATION: 90 %

## 2022-05-20 LAB
ALBUMIN SERPL BCP-MCNC: 3 G/DL (ref 3.5–5)
ALP SERPL-CCNC: 48 U/L (ref 34–104)
ALT SERPL W P-5'-P-CCNC: 5 U/L (ref 7–52)
ANION GAP SERPL CALCULATED.3IONS-SCNC: 7 MMOL/L (ref 4–13)
AST SERPL W P-5'-P-CCNC: 7 U/L (ref 13–39)
BILIRUB SERPL-MCNC: 1.61 MG/DL (ref 0.2–1)
BUN SERPL-MCNC: 8 MG/DL (ref 5–25)
CALCIUM ALBUM COR SERPL-MCNC: 8.8 MG/DL (ref 8.3–10.1)
CALCIUM SERPL-MCNC: 8 MG/DL (ref 8.4–10.2)
CHLORIDE SERPL-SCNC: 108 MMOL/L (ref 96–108)
CO2 SERPL-SCNC: 24 MMOL/L (ref 21–32)
CREAT SERPL-MCNC: 0.61 MG/DL (ref 0.6–1.3)
ERYTHROCYTE [DISTWIDTH] IN BLOOD BY AUTOMATED COUNT: 13.2 % (ref 11.6–15.1)
GFR SERPL CREATININE-BSD FRML MDRD: 90 ML/MIN/1.73SQ M
GLUCOSE SERPL-MCNC: 129 MG/DL (ref 65–140)
HCT VFR BLD AUTO: 36.3 % (ref 34.8–46.1)
HGB BLD-MCNC: 11.5 G/DL (ref 11.5–15.4)
MAGNESIUM SERPL-MCNC: 2 MG/DL (ref 1.9–2.7)
MCH RBC QN AUTO: 30.6 PG (ref 26.8–34.3)
MCHC RBC AUTO-ENTMCNC: 31.7 G/DL (ref 31.4–37.4)
MCV RBC AUTO: 97 FL (ref 82–98)
PLATELET # BLD AUTO: 170 THOUSANDS/UL (ref 149–390)
PMV BLD AUTO: 10.3 FL (ref 8.9–12.7)
POTASSIUM SERPL-SCNC: 3.7 MMOL/L (ref 3.5–5.3)
PROT SERPL-MCNC: 5.1 G/DL (ref 6.4–8.4)
RBC # BLD AUTO: 3.76 MILLION/UL (ref 3.81–5.12)
SODIUM SERPL-SCNC: 139 MMOL/L (ref 135–147)
WBC # BLD AUTO: 3.88 THOUSAND/UL (ref 4.31–10.16)

## 2022-05-20 PROCEDURE — 80053 COMPREHEN METABOLIC PANEL: CPT | Performed by: SURGERY

## 2022-05-20 PROCEDURE — 85027 COMPLETE CBC AUTOMATED: CPT | Performed by: SURGERY

## 2022-05-20 PROCEDURE — 99232 SBSQ HOSP IP/OBS MODERATE 35: CPT | Performed by: SURGERY

## 2022-05-20 PROCEDURE — NC001 PR NO CHARGE: Performed by: SURGERY

## 2022-05-20 PROCEDURE — 83735 ASSAY OF MAGNESIUM: CPT | Performed by: SURGERY

## 2022-05-20 RX ORDER — POTASSIUM CHLORIDE 20 MEQ/1
40 TABLET, EXTENDED RELEASE ORAL ONCE
Status: COMPLETED | OUTPATIENT
Start: 2022-05-20 | End: 2022-05-20

## 2022-05-20 RX ORDER — AMLODIPINE BESYLATE 5 MG/1
5 TABLET ORAL 2 TIMES DAILY
Status: DISCONTINUED | OUTPATIENT
Start: 2022-05-20 | End: 2022-05-20 | Stop reason: HOSPADM

## 2022-05-20 RX ORDER — METOPROLOL SUCCINATE 25 MG/1
25 TABLET, EXTENDED RELEASE ORAL 2 TIMES DAILY
Status: DISCONTINUED | OUTPATIENT
Start: 2022-05-20 | End: 2022-05-20 | Stop reason: HOSPADM

## 2022-05-20 RX ADMIN — AMLODIPINE BESYLATE 5 MG: 5 TABLET ORAL at 08:43

## 2022-05-20 RX ADMIN — POTASSIUM CHLORIDE 40 MEQ: 1500 TABLET, EXTENDED RELEASE ORAL at 09:28

## 2022-05-20 RX ADMIN — DEXTROSE, SODIUM CHLORIDE, AND POTASSIUM CHLORIDE 75 ML/HR: 5; .45; .15 INJECTION INTRAVENOUS at 16:29

## 2022-05-20 RX ADMIN — METOPROLOL SUCCINATE 25 MG: 25 TABLET, EXTENDED RELEASE ORAL at 08:43

## 2022-05-20 RX ADMIN — AMLODIPINE BESYLATE 5 MG: 5 TABLET ORAL at 17:18

## 2022-05-20 RX ADMIN — HEPARIN SODIUM 5000 UNITS: 5000 INJECTION INTRAVENOUS; SUBCUTANEOUS at 13:53

## 2022-05-20 RX ADMIN — DEXTROSE, SODIUM CHLORIDE, AND POTASSIUM CHLORIDE 100 ML/HR: 5; .45; .15 INJECTION INTRAVENOUS at 03:47

## 2022-05-20 RX ADMIN — HEPARIN SODIUM 5000 UNITS: 5000 INJECTION INTRAVENOUS; SUBCUTANEOUS at 05:49

## 2022-05-20 NOTE — PROGRESS NOTES
Progress Note - General Surgery   Rayo Dux 66 y o  female MRN: [id number]  Unit/Bed#: W -32 Encounter: [id number]    Assessment:  65 y/o F w h/o hysterectomy, recurrent SBO s/p ex lap ROSCOE 7/21  Now p/w recurrent SBO       Vss  Afebrile  Abd soft, nontender, non distended  NGT: 50 cc output       Plan:  Remove NG and start CLD  IVF  dvt ppx  Trend LFT  Outpatient GI follow up    Subjective/Objective     Subjective:   Complains sore throat, no abdominal pain, passing flatus no BM    Objective:    Blood pressure 147/74, pulse 57, temperature 98 3 °F (36 8 °C), temperature source Oral, resp  rate 16, height 5' 5" (1 651 m), weight 64 7 kg (142 lb 10 2 oz), SpO2 95 %  ,Body mass index is 23 74 kg/m²        Intake/Output Summary (Last 24 hours) at 5/20/2022 0718  Last data filed at 5/20/2022 0501  Gross per 24 hour   Intake 1883 34 ml   Output 50 ml   Net 1833 34 ml       Invasive Devices  Report    Peripheral Intravenous Line  Duration           Peripheral IV 05/18/22 Right Antecubital 1 day          Drain  Duration           NG/OG/Enteral Tube Nasogastric 18 Fr Left nare 1 day                Physical Exam:   Gen:  NAD  CV:  warm, well-perfused  Lungs: nl effort  Abd:  soft, NT/ND, tympanic, NG in place  Ext:  no CCE  Neuro: A&Ox3     Results from last 7 days   Lab Units 05/20/22  0426 05/19/22  0514 05/18/22  1808   WBC Thousand/uL 3 88* 4 56 8 48   HEMOGLOBIN g/dL 11 5 11 6 14 4   HEMATOCRIT % 36 3 36 2 43 9   PLATELETS Thousands/uL 170 179 238     Results from last 7 days   Lab Units 05/20/22  0426 05/19/22  0514 05/18/22  1808   POTASSIUM mmol/L 3 7 4 1 4 3   CHLORIDE mmol/L 108 109* 103   CO2 mmol/L 24 25 26   BUN mg/dL 8 13 15   CREATININE mg/dL 0 61 0 65 0 73   CALCIUM mg/dL 8 0* 7 9* 9 3

## 2022-05-20 NOTE — NURSING NOTE
Patient admitted with c/o ABD pain, nausea and vomiting; dx: partial SBO  Now has clinically improved; NGT removed; is tolerating diet; is passing flatus  No bowel movement yet  Abdominal examination WNL  Has been evaluated by surgical resident and is cleared for d/c tonight  All discharge instructions given to patient and spouse  Both verbalized understanding of teaching  Patient accompanied by spouse as she is leaving the unit now

## 2022-05-20 NOTE — DISCHARGE SUMMARY
Discharge Summary - Eric Zhang 66 y o  female MRN: [id number]    Unit/Bed#: W -95 Encounter: [id number]    Admission Date:   Admission Orders (From admission, onward)     Ordered        05/18/22 2045  Inpatient Admission  Once            05/18/22 1939  Inpatient Admission  Once,   Status:  Canceled                        Admitting Diagnosis: Abdominal pain [R10 9]  Gallstones [K80 20]  Partial small bowel obstruction ([address] ) [K56 600]    HPI:  Patient is a 64-year-old female with past medical history of CKD, GERD, hypertension, Lyme disease, hysterectomy greater than 20 years ago complicated by recurrent small-bowel obstruction  Patient is status post exploratory laparotomy with lysis of adhesions 7/ 2021 at outside facility  This admission is the 1st recurrent small-bowel obstruction after her surgery last year  She reported 1 day of abdominal pain prior to arrival associated with multiple episodes of nausea  Patient was admitted to the surgery service, conservative management was performed with resolution of her small-bowel obstruction  On day 2 of discharge, her NG tube was removed, she was tolerating clear liquids, her diet was advanced to regular diet which she was tolerating without issues  She is ambulating, passing flatus, her abdomen was soft nontender nondistended  She was deemed medically stable for discharge on 05/20/2022  Also of note, initial CT imaging demonstrating small amount gallstones within the gallbladder, and findings that were initially concerning with choledocholithiasis, and elevated bilirubin  However right upper quadrant ultrasound was obtained, there was no choledocholithiasis  Gallstones were present without signs of acute cholecystitis  GI was consulted, and hyperbilirubinemia was primarily indirect, consistent with Gilbert's syndrome    Patient will follow-up in the outpatient clinic in 2 weeks with General surgery for monitoring of her symptoms, and discuss elective laparoscopic cholecystectomy  Procedures Performed: No orders of the defined types were placed in this encounter  Summary of Hospital Course: see above  Significant Findings, Care, Treatment and Services Provided: none    Complications: same  Discharge Diagnosis: same    Medical Problems             Resolved Problems  Date Reviewed: 10/22/2021   None                 Condition at Discharge: good         Discharge instructions/Information to patient and family:   See after visit summary for information provided to patient and family  Provisions for Follow-Up Care:  See after visit summary for information related to follow-up care and any pertinent home health orders  PCP: Neto Tee DO    Disposition: See After Visit Summary for discharge disposition information  Planned Readmission: No      Discharge Statement   I spent 30 minutes discharging the patient  This time was spent on the day of discharge  I had direct contact with the patient on the day of discharge  Additional documentation is required if more than 30 minutes were spent on discharge  Discharge Medications:  See after visit summary for reconciled discharge medications provided to patient and family

## 2022-05-20 NOTE — PLAN OF CARE
Problem: MOBILITY - ADULT  Goal: Maintain or return to baseline ADL function  Description: INTERVENTIONS:  -  Assess patient's ability to carry out ADLs; assess patient's baseline for ADL function and identify physical deficits which impact ability to perform ADLs (bathing, care of mouth/teeth, toileting, grooming, dressing, etc )  - Assess/evaluate cause of self-care deficits   - Assess range of motion  - Assess patient's mobility; develop plan if impaired  - Assess patient's need for assistive devices and provide as appropriate  - Encourage maximum independence but intervene and supervise when necessary  - Involve family in performance of ADLs  - Assess for home care needs following discharge   - Consider OT consult to assist with ADL evaluation and planning for discharge  - Provide patient education as appropriate  Outcome: Progressing  Goal: Maintains/Returns to pre admission functional level  Description: INTERVENTIONS:  - Perform BMAT or MOVE assessment daily    - Set and communicate daily mobility goal to care team and patient/family/caregiver  - Collaborate with rehabilitation services on mobility goals if consulted  - Perform Range of Motion  times a day  - Reposition patient every  hours    - Dangle patient  times a day  - Stand patient  times a day  - Ambulate patient  times a day  - Out of bed to chair  times a day   - Out of bed for meals  times a day  - Out of bed for toileting  - Record patient progress and toleration of activity level   Outcome: Progressing     Problem: Potential for Falls  Goal: Patient will remain free of falls  Description: INTERVENTIONS:  - Educate patient/family on patient safety including physical limitations  - Instruct patient to call for assistance with activity   - Consult OT/PT to assist with strengthening/mobility   - Keep Call bell within reach  - Keep bed low and locked with side rails adjusted as appropriate  - Keep care items and personal belongings within reach  - Initiate and maintain comfort rounds  - Make Fall Risk Sign visible to staff  - Offer Toileting every  Hours, in advance of need  - Initiate/Maintain alarm  - Obtain necessary fall risk management equipment:   - Apply yellow socks and bracelet for high fall risk patients  - Consider moving patient to room near nurses station  Outcome: Progressing

## 2022-06-02 ENCOUNTER — APPOINTMENT (EMERGENCY)
Dept: CT IMAGING | Facility: HOSPITAL | Age: 72
DRG: 390 | End: 2022-06-02
Payer: MEDICARE

## 2022-06-02 ENCOUNTER — HOSPITAL ENCOUNTER (INPATIENT)
Facility: HOSPITAL | Age: 72
LOS: 1 days | Discharge: HOME | DRG: 390 | End: 2022-06-04
Attending: EMERGENCY MEDICINE | Admitting: SURGERY
Payer: MEDICARE

## 2022-06-02 DIAGNOSIS — K56.609 SMALL BOWEL OBSTRUCTION (HCC): Primary | ICD-10-CM

## 2022-06-02 LAB
ALBUMIN SERPL BCP-MCNC: 4.2 G/DL (ref 3.5–5)
ALP SERPL-CCNC: 66 U/L (ref 34–104)
ALT SERPL W P-5'-P-CCNC: 8 U/L (ref 7–52)
ANION GAP SERPL CALCULATED.3IONS-SCNC: 10 MMOL/L (ref 4–13)
AST SERPL W P-5'-P-CCNC: 11 U/L (ref 13–39)
BASOPHILS # BLD AUTO: 0.06 THOUSANDS/ΜL (ref 0–0.1)
BASOPHILS NFR BLD AUTO: 1 % (ref 0–1)
BILIRUB SERPL-MCNC: 1.06 MG/DL (ref 0.2–1)
BUN SERPL-MCNC: 15 MG/DL (ref 5–25)
CALCIUM SERPL-MCNC: 10.2 MG/DL (ref 8.4–10.2)
CHLORIDE SERPL-SCNC: 103 MMOL/L (ref 96–108)
CO2 SERPL-SCNC: 27 MMOL/L (ref 21–32)
CREAT SERPL-MCNC: 0.83 MG/DL (ref 0.6–1.3)
EOSINOPHIL # BLD AUTO: 0.11 THOUSAND/ΜL (ref 0–0.61)
EOSINOPHIL NFR BLD AUTO: 2 % (ref 0–6)
ERYTHROCYTE [DISTWIDTH] IN BLOOD BY AUTOMATED COUNT: 12.7 % (ref 11.6–15.1)
GFR SERPL CREATININE-BSD FRML MDRD: 70 ML/MIN/1.73SQ M
GLUCOSE SERPL-MCNC: 124 MG/DL (ref 65–140)
HCT VFR BLD AUTO: 44.6 % (ref 34.8–46.1)
HGB BLD-MCNC: 14.4 G/DL (ref 11.5–15.4)
HOLD SPECIMEN: NORMAL
IMM GRANULOCYTES # BLD AUTO: 0.03 THOUSAND/UL (ref 0–0.2)
IMM GRANULOCYTES NFR BLD AUTO: 1 % (ref 0–2)
LACTATE SERPL-SCNC: 0.7 MMOL/L (ref 0.5–2)
LIPASE SERPL-CCNC: 38 U/L (ref 11–82)
LYMPHOCYTES # BLD AUTO: 1.21 THOUSANDS/ΜL (ref 0.6–4.47)
LYMPHOCYTES NFR BLD AUTO: 22 % (ref 14–44)
MCH RBC QN AUTO: 31 PG (ref 26.8–34.3)
MCHC RBC AUTO-ENTMCNC: 32.3 G/DL (ref 31.4–37.4)
MCV RBC AUTO: 96 FL (ref 82–98)
MONOCYTES # BLD AUTO: 0.5 THOUSAND/ΜL (ref 0.17–1.22)
MONOCYTES NFR BLD AUTO: 9 % (ref 4–12)
NEUTROPHILS # BLD AUTO: 3.55 THOUSANDS/ΜL (ref 1.85–7.62)
NEUTS SEG NFR BLD AUTO: 65 % (ref 43–75)
NRBC BLD AUTO-RTO: 0 /100 WBCS
PLATELET # BLD AUTO: 314 THOUSANDS/UL (ref 149–390)
PMV BLD AUTO: 10 FL (ref 8.9–12.7)
POTASSIUM SERPL-SCNC: 4.1 MMOL/L (ref 3.5–5.3)
PROT SERPL-MCNC: 7.3 G/DL (ref 6.4–8.4)
RBC # BLD AUTO: 4.65 MILLION/UL (ref 3.81–5.12)
SODIUM SERPL-SCNC: 140 MMOL/L (ref 135–147)
WBC # BLD AUTO: 5.46 THOUSAND/UL (ref 4.31–10.16)

## 2022-06-02 PROCEDURE — 99285 EMERGENCY DEPT VISIT HI MDM: CPT | Performed by: EMERGENCY MEDICINE

## 2022-06-02 PROCEDURE — 83690 ASSAY OF LIPASE: CPT | Performed by: EMERGENCY MEDICINE

## 2022-06-02 PROCEDURE — 99285 EMERGENCY DEPT VISIT HI MDM: CPT

## 2022-06-02 PROCEDURE — 96375 TX/PRO/DX INJ NEW DRUG ADDON: CPT

## 2022-06-02 PROCEDURE — 93005 ELECTROCARDIOGRAM TRACING: CPT

## 2022-06-02 PROCEDURE — 36415 COLL VENOUS BLD VENIPUNCTURE: CPT

## 2022-06-02 PROCEDURE — 74176 CT ABD & PELVIS W/O CONTRAST: CPT

## 2022-06-02 PROCEDURE — 85025 COMPLETE CBC W/AUTO DIFF WBC: CPT | Performed by: EMERGENCY MEDICINE

## 2022-06-02 PROCEDURE — 96361 HYDRATE IV INFUSION ADD-ON: CPT

## 2022-06-02 PROCEDURE — 83605 ASSAY OF LACTIC ACID: CPT | Performed by: STUDENT IN AN ORGANIZED HEALTH CARE EDUCATION/TRAINING PROGRAM

## 2022-06-02 PROCEDURE — 80053 COMPREHEN METABOLIC PANEL: CPT | Performed by: EMERGENCY MEDICINE

## 2022-06-02 PROCEDURE — 96374 THER/PROPH/DIAG INJ IV PUSH: CPT

## 2022-06-02 PROCEDURE — C9113 INJ PANTOPRAZOLE SODIUM, VIA: HCPCS | Performed by: STUDENT IN AN ORGANIZED HEALTH CARE EDUCATION/TRAINING PROGRAM

## 2022-06-02 RX ORDER — METOPROLOL SUCCINATE 25 MG/1
25 TABLET, EXTENDED RELEASE ORAL 2 TIMES DAILY
Status: DISCONTINUED | OUTPATIENT
Start: 2022-06-02 | End: 2022-06-04 | Stop reason: HOSPADM

## 2022-06-02 RX ORDER — HEPARIN SODIUM 5000 [USP'U]/ML
5000 INJECTION, SOLUTION INTRAVENOUS; SUBCUTANEOUS EVERY 8 HOURS SCHEDULED
Status: DISCONTINUED | OUTPATIENT
Start: 2022-06-02 | End: 2022-06-04 | Stop reason: HOSPADM

## 2022-06-02 RX ORDER — AMLODIPINE BESYLATE 5 MG/1
5 TABLET ORAL 2 TIMES DAILY
Status: DISCONTINUED | OUTPATIENT
Start: 2022-06-02 | End: 2022-06-04 | Stop reason: HOSPADM

## 2022-06-02 RX ORDER — ONDANSETRON 2 MG/ML
4 INJECTION INTRAMUSCULAR; INTRAVENOUS EVERY 6 HOURS PRN
Status: DISCONTINUED | OUTPATIENT
Start: 2022-06-02 | End: 2022-06-04 | Stop reason: HOSPADM

## 2022-06-02 RX ORDER — ONDANSETRON 4 MG/1
4 TABLET, ORALLY DISINTEGRATING ORAL ONCE
Status: COMPLETED | OUTPATIENT
Start: 2022-06-02 | End: 2022-06-02

## 2022-06-02 RX ORDER — SODIUM CHLORIDE, SODIUM LACTATE, POTASSIUM CHLORIDE, CALCIUM CHLORIDE 600; 310; 30; 20 MG/100ML; MG/100ML; MG/100ML; MG/100ML
125 INJECTION, SOLUTION INTRAVENOUS CONTINUOUS
Status: DISCONTINUED | OUTPATIENT
Start: 2022-06-02 | End: 2022-06-04

## 2022-06-02 RX ORDER — ONDANSETRON 2 MG/ML
4 INJECTION INTRAMUSCULAR; INTRAVENOUS ONCE
Status: COMPLETED | OUTPATIENT
Start: 2022-06-02 | End: 2022-06-02

## 2022-06-02 RX ORDER — PANTOPRAZOLE SODIUM 40 MG/1
40 INJECTION, POWDER, FOR SOLUTION INTRAVENOUS
Status: DISCONTINUED | OUTPATIENT
Start: 2022-06-02 | End: 2022-06-04 | Stop reason: HOSPADM

## 2022-06-02 RX ORDER — ACETAMINOPHEN 325 MG/1
650 TABLET ORAL EVERY 6 HOURS PRN
Status: DISCONTINUED | OUTPATIENT
Start: 2022-06-02 | End: 2022-06-04 | Stop reason: HOSPADM

## 2022-06-02 RX ORDER — HYDROMORPHONE HCL/PF 1 MG/ML
0.5 SYRINGE (ML) INJECTION ONCE
Status: COMPLETED | OUTPATIENT
Start: 2022-06-02 | End: 2022-06-02

## 2022-06-02 RX ORDER — HYDROMORPHONE HCL IN WATER/PF 6 MG/30 ML
0.2 PATIENT CONTROLLED ANALGESIA SYRINGE INTRAVENOUS
Status: DISCONTINUED | OUTPATIENT
Start: 2022-06-02 | End: 2022-06-04 | Stop reason: HOSPADM

## 2022-06-02 RX ORDER — OXYCODONE HYDROCHLORIDE 5 MG/1
5 TABLET ORAL EVERY 6 HOURS PRN
Status: DISCONTINUED | OUTPATIENT
Start: 2022-06-02 | End: 2022-06-04 | Stop reason: HOSPADM

## 2022-06-02 RX ORDER — OXYCODONE HYDROCHLORIDE 10 MG/1
10 TABLET ORAL EVERY 6 HOURS PRN
Status: DISCONTINUED | OUTPATIENT
Start: 2022-06-02 | End: 2022-06-04 | Stop reason: HOSPADM

## 2022-06-02 RX ADMIN — METOPROLOL SUCCINATE 25 MG: 25 TABLET, EXTENDED RELEASE ORAL at 21:18

## 2022-06-02 RX ADMIN — ONDANSETRON 4 MG: 2 INJECTION INTRAMUSCULAR; INTRAVENOUS at 18:58

## 2022-06-02 RX ADMIN — SODIUM CHLORIDE 1000 ML: 0.9 INJECTION, SOLUTION INTRAVENOUS at 18:58

## 2022-06-02 RX ADMIN — PANTOPRAZOLE SODIUM 40 MG: 40 INJECTION, POWDER, FOR SOLUTION INTRAVENOUS at 22:36

## 2022-06-02 RX ADMIN — SODIUM CHLORIDE, SODIUM LACTATE, POTASSIUM CHLORIDE, AND CALCIUM CHLORIDE 125 ML/HR: .6; .31; .03; .02 INJECTION, SOLUTION INTRAVENOUS at 21:17

## 2022-06-02 RX ADMIN — AMLODIPINE BESYLATE 5 MG: 5 TABLET ORAL at 21:17

## 2022-06-02 RX ADMIN — HYDROMORPHONE HYDROCHLORIDE 0.5 MG: 1 INJECTION, SOLUTION INTRAMUSCULAR; INTRAVENOUS; SUBCUTANEOUS at 18:59

## 2022-06-02 RX ADMIN — OXYCODONE HYDROCHLORIDE 10 MG: 10 TABLET ORAL at 21:18

## 2022-06-02 RX ADMIN — ONDANSETRON 4 MG: 4 TABLET, ORALLY DISINTEGRATING ORAL at 17:44

## 2022-06-02 NOTE — ED PROVIDER NOTES
History  Chief Complaint   Patient presents with    Abdominal Pain     Abd pain and nausea  Hx of obstruction 2 weeks ago     25-year-old female with a history of small-bowel obstructions, community ED for evaluation of abdominal pain generalized and nausea  Started few hours ago  She states she has a history of recently being admitted about 2 weeks ago with a small-bowel obstruction and was managed medically with an NG tube and state for several days and that was discharged  She did have a surgery for lysis of adhesions about 1 year ago  History provided by:  Patient   used: No    Abdominal Pain  Associated symptoms: nausea        Prior to Admission Medications   Prescriptions Last Dose Informant Patient Reported?  Taking?   acetaminophen (TYLENOL) 325 mg tablet   No No   Sig: Take 2 tablets (650 mg total) by mouth every 4 (four) hours as needed for mild pain   amLODIPine (NORVASC) 5 mg tablet  Self Yes No   Sig: Take 5 mg by mouth 2 (two) times a day   metoprolol succinate (TOPROL-XL) 25 mg 24 hr tablet  Self Yes No   Sig: Take 25 mg by mouth 2 (two) times a day       Facility-Administered Medications: None       Past Medical History:   Diagnosis Date    Chronic kidney disease     GERD (gastroesophageal reflux disease)     Hypertension     Lyme disease        Past Surgical History:   Procedure Laterality Date    FL RETROGRADE PYELOGRAM  10/21/2021    FL RETROGRADE PYELOGRAM  2/25/2022    HYSTERECTOMY      MO CYSTOURETHROSCOPY,URETER CATHETER Bilateral 10/21/2021    Procedure: CYSTOSCOPY RETROGRADE PYELOGRAM WITH INSERTION STENT URETERAL;  Surgeon: Nany Coker MD;  [facility];  Service: Urology    MO CYSTOURETHROSCOPY,URETER CATHETER Left 2/25/2022    Procedure: CYSTOSCOPY RETROGRADE PYELOGRAM WITH INSERTION STENT URETERAL; DIAGNOSTIC URETEROSCOPY;  Surgeon: Nany Coker MD;  Location: [facility];  Service: Urology       Family History   Problem Relation Age of Onset    Stroke Father      I have reviewed and agree with the history as documented  E-Cigarette/Vaping    E-Cigarette Use Never User      E-Cigarette/Vaping Substances     Social History     Tobacco Use    Smoking status: Never Smoker    Smokeless tobacco: Never Used   Vaping Use    Vaping Use: Never used   Substance Use Topics    Alcohol use: Yes     Comment: socially    Drug use: No       Review of Systems   Gastrointestinal: Positive for abdominal pain and nausea  All other systems reviewed and are negative  Physical Exam  Physical Exam  Vitals and nursing note reviewed  Constitutional:       General: She is not in acute distress  Appearance: Normal appearance  She is well-developed and normal weight  She is not ill-appearing, toxic-appearing or diaphoretic  HENT:      Head: Normocephalic and atraumatic  Right Ear: External ear normal       Left Ear: External ear normal       Nose: Nose normal       Mouth/Throat:      Mouth: Mucous membranes are moist       Pharynx: Oropharynx is clear  Eyes:      Conjunctiva/sclera: Conjunctivae normal    Cardiovascular:      Rate and Rhythm: Normal rate and regular rhythm  Pulses: Normal pulses  Heart sounds: Normal heart sounds  Pulmonary:      Effort: Pulmonary effort is normal       Breath sounds: Normal breath sounds  Abdominal:      General: Abdomen is flat  Bowel sounds are normal  There is distension  There is no abdominal bruit  There are no signs of injury  Palpations: Abdomen is soft  There is no shifting dullness  Tenderness: There is abdominal tenderness  There is no guarding or rebound  Hernia: No hernia is present  Genitourinary:     Adnexa: Right adnexa normal and left adnexa normal    Musculoskeletal:         General: Normal range of motion  Cervical back: Normal range of motion and neck supple  Skin:     General: Skin is warm and dry        Capillary Refill: Capillary refill takes less than 2 seconds  Neurological:      General: No focal deficit present  Mental Status: She is alert and oriented to person, place, and time  Mental status is at baseline     Psychiatric:         Mood and Affect: Mood normal          Behavior: Behavior normal          Vital Signs  ED Triage Vitals   Temperature Pulse Respirations Blood Pressure SpO2   06/02/22 1739 06/02/22 1739 06/02/22 1739 06/02/22 1739 06/02/22 1739   98 °F (36 7 °C) 56 18 152/70 97 %      Temp Source Heart Rate Source Patient Position - Orthostatic VS BP Location FiO2 (%)   06/02/22 1739 06/02/22 1900 06/02/22 1739 06/02/22 1739 --   Oral Monitor Sitting Left arm       Pain Score       06/02/22 1900       7           Vitals:    06/02/22 1900 06/02/22 2115 06/03/22 0009 06/03/22 0734   BP: 167/82 165/75 149/67 124/64   Pulse: (!) 54 58 57 67   Patient Position - Orthostatic VS: Lying Lying           Visual Acuity      ED Medications  Medications   amLODIPine (NORVASC) tablet 5 mg (5 mg Oral Given 6/3/22 0820)   metoprolol succinate (TOPROL-XL) 24 hr tablet 25 mg (25 mg Oral Given 6/3/22 0820)   lactated ringers infusion (125 mL/hr Intravenous New Bag 6/3/22 0511)   ondansetron (ZOFRAN) injection 4 mg (4 mg Intravenous Given 6/3/22 0313)   heparin (porcine) subcutaneous injection 5,000 Units (5,000 Units Subcutaneous Refused 6/3/22 0508)   acetaminophen (TYLENOL) tablet 650 mg (has no administration in time range)   oxyCODONE (ROXICODONE) IR tablet 5 mg (has no administration in time range)   oxyCODONE (ROXICODONE) immediate release tablet 10 mg (10 mg Oral Given 6/3/22 0510)   HYDROmorphone HCl (DILAUDID) injection 0 2 mg (0 2 mg Intravenous Given 6/3/22 0316)   pantoprazole (PROTONIX) injection 40 mg (40 mg Intravenous Given 6/3/22 0820)   ondansetron (ZOFRAN-ODT) dispersible tablet 4 mg (4 mg Oral Given 6/2/22 1744)   sodium chloride 0 9 % bolus 1,000 mL (0 mL Intravenous Stopped 6/2/22 1958)   HYDROmorphone (DILAUDID) injection 0 5 mg (0 5 mg Intravenous Given 6/2/22 1859)   ondansetron (ZOFRAN) injection 4 mg (4 mg Intravenous Given 6/2/22 1858)       Diagnostic Studies  Results Reviewed     Procedure Component Value Units Date/Time    Basic metabolic panel [[id number]]  (Abnormal) Collected: 06/03/22 0507    Lab Status: Final result Specimen: Blood from Arm, Right Updated: 06/03/22 0647     Sodium 138 mmol/L      Potassium 4 6 mmol/L      Chloride 105 mmol/L      CO2 26 mmol/L      ANION GAP 7 mmol/L      BUN 15 mg/dL      Creatinine 0 69 mg/dL      Glucose 163 mg/dL      Glucose, Fasting 163 mg/dL      Calcium 9 2 mg/dL      eGFR 86 ml/min/1 73sq m     Narrative:      [place] guidelines for Chronic Kidney Disease (CKD):     Stage 1 with normal or high GFR (GFR > 90 mL/min/1 73 square meters)    Stage 2 Mild CKD (GFR = 60-89 mL/min/1 73 square meters)    Stage 3A Moderate CKD (GFR = 45-59 mL/min/1 73 square meters)    Stage 3B Moderate CKD (GFR = 30-44 mL/min/1 73 square meters)    Stage 4 Severe CKD (GFR = 15-29 mL/min/1 73 square meters)    Stage 5 End Stage CKD (GFR <15 mL/min/1 73 square meters)  Note: GFR calculation is accurate only with a steady state creatinine    Phosphorus [[id number]]  (Abnormal) Collected: 06/03/22 0507    Lab Status: Final result Specimen: Blood from Arm, Right Updated: 06/03/22 0647     Phosphorus 4 5 mg/dL     Magnesium [[id number]]  (Abnormal) Collected: 06/03/22 0507    Lab Status: Final result Specimen: Blood from Arm, Right Updated: 06/03/22 0647     Magnesium 1 6 mg/dL     CBC and differential [[id number]]  (Abnormal) Collected: 06/03/22 0507    Lab Status: Final result Specimen: Blood from Arm, Right Updated: 06/03/22 0524     WBC 8 31 Thousand/uL      RBC 4 23 Million/uL      Hemoglobin 13 2 g/dL      Hematocrit 40 8 %      MCV 97 fL      MCH 31 2 pg      MCHC 32 4 g/dL      RDW 13 0 %      MPV 10 3 fL      Platelets 653 Thousands/uL      nRBC 0 /100 WBCs Neutrophils Relative 82 %      Immat GRANS % 0 %      Lymphocytes Relative 7 %      Monocytes Relative 9 %      Eosinophils Relative 1 %      Basophils Relative 1 %      Neutrophils Absolute 6 86 Thousands/µL      Immature Grans Absolute 0 03 Thousand/uL      Lymphocytes Absolute 0 56 Thousands/µL      Monocytes Absolute 0 77 Thousand/µL      Eosinophils Absolute 0 05 Thousand/µL      Basophils Absolute 0 04 Thousands/µL     Lactic acid, plasma [[id number]]  (Normal) Collected: 06/02/22 2116    Lab Status: Final result Specimen: Blood from Arm, Left Updated: 06/02/22 2149     LACTIC ACID 0 7 mmol/L     Narrative:      Result may be elevated if tourniquet was used during collection  [place] draw [[id number]] Collected: 06/02/22 1752    Lab Status: Final result Specimen: Blood from Arm, Right Updated: 06/02/22 1903    Narrative: The following orders were created for panel order [place] draw  Procedure                               Abnormality         Status                     ---------                               -----------         ------                     Acie Sobia Top on [id number][[id number]]                           Final result               Green / Black tube on YJVJ[[id number]]                       Final result                 Please view results for these tests on the individual orders      Comprehensive metabolic panel [[id number]]  (Abnormal) Collected: 06/02/22 1752    Lab Status: Final result Specimen: Blood from Arm, Right Updated: 06/02/22 1820     Sodium 140 mmol/L      Potassium 4 1 mmol/L      Chloride 103 mmol/L      CO2 27 mmol/L      ANION GAP 10 mmol/L      BUN 15 mg/dL      Creatinine 0 83 mg/dL      Glucose 124 mg/dL      Calcium 10 2 mg/dL      AST 11 U/L      ALT 8 U/L      Alkaline Phosphatase 66 U/L      Total Protein 7 3 g/dL      Albumin 4 2 g/dL      Total Bilirubin 1 06 mg/dL      eGFR 70 ml/min/1 73sq m     Narrative:      [place] guidelines for Chronic Kidney Disease (CKD):     Stage 1 with normal or high GFR (GFR > 90 mL/min/1 73 square meters)    Stage 2 Mild CKD (GFR = 60-89 mL/min/1 73 square meters)    Stage 3A Moderate CKD (GFR = 45-59 mL/min/1 73 square meters)    Stage 3B Moderate CKD (GFR = 30-44 mL/min/1 73 square meters)    Stage 4 Severe CKD (GFR = 15-29 mL/min/1 73 square meters)    Stage 5 End Stage CKD (GFR <15 mL/min/1 73 square meters)  Note: GFR calculation is accurate only with a steady state creatinine    Lipase [[id number]]  (Normal) Collected: 06/02/22 1752    Lab Status: Final result Specimen: Blood from Arm, Right Updated: 06/02/22 1820     Lipase 38 u/L     CBC and differential [[id number]] Collected: 06/02/22 1752    Lab Status: Final result Specimen: Blood from Arm, Right Updated: 06/02/22 1806     WBC 5 46 Thousand/uL      RBC 4 65 Million/uL      Hemoglobin 14 4 g/dL      Hematocrit 44 6 %      MCV 96 fL      MCH 31 0 pg      MCHC 32 3 g/dL      RDW 12 7 %      MPV 10 0 fL      Platelets 223 Thousands/uL      nRBC 0 /100 WBCs      Neutrophils Relative 65 %      Immat GRANS % 1 %      Lymphocytes Relative 22 %      Monocytes Relative 9 %      Eosinophils Relative 2 %      Basophils Relative 1 %      Neutrophils Absolute 3 55 Thousands/µL      Immature Grans Absolute 0 03 Thousand/uL      Lymphocytes Absolute 1 21 Thousands/µL      Monocytes Absolute 0 50 Thousand/µL      Eosinophils Absolute 0 11 Thousand/µL      Basophils Absolute 0 06 Thousands/µL                  CT abdomen pelvis wo contrast   Final Result by Macarena Fritz MD (06/02 1916)      Small bowel obstruction reidentified with decompressed terminal ileum consistent with at least a partial small bowel obstruction  Consider further evaluation with small bowel enterography or fluoroscopic small bowel follow-through  Gallstones without surrounding inflammation  Stable appearance of the left nephroureteral stent with persistent mild left hydronephrosis  Workstation performed: [id number]                    Procedures  Procedures         ED Course     CT shows SBO  Admit to general surgery service for further management  SBIRT 22yo+    Flowsheet Row Most Recent Value   SBIRT (25 yo +)    In order to provide better care to our patients, we are screening all of our patients for alcohol and drug use  Would it be okay to ask you these screening questions? Yes Filed at: 06/02/2022 1900   Initial Alcohol Screen: US AUDIT-C     1  How often do you have a drink containing alcohol? 0 Filed at: 06/02/2022 1900   2  How many drinks containing alcohol do you have on a typical day you are drinking? 0 Filed at: 06/02/2022 1900   3b  FEMALE Any Age, or MALE 65+: How often do you have 4 or more drinks on one occassion? 0 Filed at: 06/02/2022 1900   Audit-C Score 0 Filed at: 06/02/2022 1900   ROHAN: How many times in the past year have you    Used an illegal drug or used a prescription medication for non-medical reasons?  Never Filed at: 06/02/2022 1900                    MDM  Number of Diagnoses or Management Options  Small bowel obstruction [facility]): new and requires workup     Amount and/or Complexity of Data Reviewed  Clinical lab tests: ordered and reviewed  Tests in the radiology section of CPT®: ordered and reviewed    Risk of Complications, Morbidity, and/or Mortality  Presenting problems: moderate  Diagnostic procedures: moderate  Management options: moderate    Patient Progress  Patient progress: stable      Disposition  Final diagnoses:   Small bowel obstruction ([address] )     Time reflects when diagnosis was documented in both MDM as applicable and the Disposition within this note     Time User Action Codes Description Comment    6/3/2022 10:19 AM Leila Krishna Add [K56 609] Small bowel obstruction [facility])       ED Disposition     ED Disposition   Admit    Condition   Stable    Date/Time   Fri Kennedy 3, 2022 10:19 AM    Comment   Case was discussed with general surgery resident and the patient's admission status was agreed to be Admission Status: inpatient status to the service of Dr Bernard Ferrari   Follow-up Information    None         Current Discharge Medication List      CONTINUE these medications which have NOT CHANGED    Details   acetaminophen (TYLENOL) 325 mg tablet Take 2 tablets (650 mg total) by mouth every 4 (four) hours as needed for mild pain  Qty: 30 tablet, Refills: 0    Associated Diagnoses: Other hydronephrosis      amLODIPine (NORVASC) 5 mg tablet Take 5 mg by mouth 2 (two) times a day      metoprolol succinate (TOPROL-XL) 25 mg 24 hr tablet Take 25 mg by mouth 2 (two) times a day              No discharge procedures on file      PDMP Review     None          ED Provider  Electronically Signed by           Sera Martinez DO  06/03/22 2978

## 2022-06-03 LAB
ANION GAP SERPL CALCULATED.3IONS-SCNC: 7 MMOL/L (ref 4–13)
ATRIAL RATE: 58 BPM
BASOPHILS # BLD AUTO: 0.04 THOUSANDS/ΜL (ref 0–0.1)
BASOPHILS NFR BLD AUTO: 1 % (ref 0–1)
BUN SERPL-MCNC: 15 MG/DL (ref 5–25)
CALCIUM SERPL-MCNC: 9.2 MG/DL (ref 8.4–10.2)
CHLORIDE SERPL-SCNC: 105 MMOL/L (ref 96–108)
CO2 SERPL-SCNC: 26 MMOL/L (ref 21–32)
CREAT SERPL-MCNC: 0.69 MG/DL (ref 0.6–1.3)
EOSINOPHIL # BLD AUTO: 0.05 THOUSAND/ΜL (ref 0–0.61)
EOSINOPHIL NFR BLD AUTO: 1 % (ref 0–6)
ERYTHROCYTE [DISTWIDTH] IN BLOOD BY AUTOMATED COUNT: 13 % (ref 11.6–15.1)
GFR SERPL CREATININE-BSD FRML MDRD: 86 ML/MIN/1.73SQ M
GLUCOSE P FAST SERPL-MCNC: 163 MG/DL (ref 65–99)
GLUCOSE SERPL-MCNC: 163 MG/DL (ref 65–140)
HCT VFR BLD AUTO: 40.8 % (ref 34.8–46.1)
HGB BLD-MCNC: 13.2 G/DL (ref 11.5–15.4)
IMM GRANULOCYTES # BLD AUTO: 0.03 THOUSAND/UL (ref 0–0.2)
IMM GRANULOCYTES NFR BLD AUTO: 0 % (ref 0–2)
LYMPHOCYTES # BLD AUTO: 0.56 THOUSANDS/ΜL (ref 0.6–4.47)
LYMPHOCYTES NFR BLD AUTO: 7 % (ref 14–44)
MAGNESIUM SERPL-MCNC: 1.6 MG/DL (ref 1.9–2.7)
MCH RBC QN AUTO: 31.2 PG (ref 26.8–34.3)
MCHC RBC AUTO-ENTMCNC: 32.4 G/DL (ref 31.4–37.4)
MCV RBC AUTO: 97 FL (ref 82–98)
MONOCYTES # BLD AUTO: 0.77 THOUSAND/ΜL (ref 0.17–1.22)
MONOCYTES NFR BLD AUTO: 9 % (ref 4–12)
NEUTROPHILS # BLD AUTO: 6.86 THOUSANDS/ΜL (ref 1.85–7.62)
NEUTS SEG NFR BLD AUTO: 82 % (ref 43–75)
NRBC BLD AUTO-RTO: 0 /100 WBCS
P AXIS: 56 DEGREES
PHOSPHATE SERPL-MCNC: 4.5 MG/DL (ref 2.3–4.1)
PLATELET # BLD AUTO: 244 THOUSANDS/UL (ref 149–390)
PMV BLD AUTO: 10.3 FL (ref 8.9–12.7)
POTASSIUM SERPL-SCNC: 4.6 MMOL/L (ref 3.5–5.3)
PR INTERVAL: 136 MS
QRS AXIS: 76 DEGREES
QRSD INTERVAL: 134 MS
QT INTERVAL: 438 MS
QTC INTERVAL: 423 MS
RBC # BLD AUTO: 4.23 MILLION/UL (ref 3.81–5.12)
SODIUM SERPL-SCNC: 138 MMOL/L (ref 135–147)
T WAVE AXIS: 24 DEGREES
VENTRICULAR RATE: 56 BPM
WBC # BLD AUTO: 8.31 THOUSAND/UL (ref 4.31–10.16)

## 2022-06-03 PROCEDURE — 99222 1ST HOSP IP/OBS MODERATE 55: CPT | Performed by: SURGERY

## 2022-06-03 PROCEDURE — 83735 ASSAY OF MAGNESIUM: CPT | Performed by: STUDENT IN AN ORGANIZED HEALTH CARE EDUCATION/TRAINING PROGRAM

## 2022-06-03 PROCEDURE — C9113 INJ PANTOPRAZOLE SODIUM, VIA: HCPCS | Performed by: STUDENT IN AN ORGANIZED HEALTH CARE EDUCATION/TRAINING PROGRAM

## 2022-06-03 PROCEDURE — 93010 ELECTROCARDIOGRAM REPORT: CPT | Performed by: INTERNAL MEDICINE

## 2022-06-03 PROCEDURE — 84100 ASSAY OF PHOSPHORUS: CPT | Performed by: STUDENT IN AN ORGANIZED HEALTH CARE EDUCATION/TRAINING PROGRAM

## 2022-06-03 PROCEDURE — 85025 COMPLETE CBC W/AUTO DIFF WBC: CPT | Performed by: STUDENT IN AN ORGANIZED HEALTH CARE EDUCATION/TRAINING PROGRAM

## 2022-06-03 PROCEDURE — 80048 BASIC METABOLIC PNL TOTAL CA: CPT | Performed by: STUDENT IN AN ORGANIZED HEALTH CARE EDUCATION/TRAINING PROGRAM

## 2022-06-03 PROCEDURE — NC001 PR NO CHARGE: Performed by: SURGERY

## 2022-06-03 RX ORDER — MAGNESIUM SULFATE HEPTAHYDRATE 40 MG/ML
4 INJECTION, SOLUTION INTRAVENOUS ONCE
Status: COMPLETED | OUTPATIENT
Start: 2022-06-03 | End: 2022-06-03

## 2022-06-03 RX ADMIN — HYDROMORPHONE HYDROCHLORIDE 0.2 MG: 0.2 INJECTION, SOLUTION INTRAMUSCULAR; INTRAVENOUS; SUBCUTANEOUS at 03:16

## 2022-06-03 RX ADMIN — SODIUM CHLORIDE, SODIUM LACTATE, POTASSIUM CHLORIDE, AND CALCIUM CHLORIDE 125 ML/HR: .6; .31; .03; .02 INJECTION, SOLUTION INTRAVENOUS at 05:11

## 2022-06-03 RX ADMIN — ONDANSETRON 4 MG: 2 INJECTION INTRAMUSCULAR; INTRAVENOUS at 03:13

## 2022-06-03 RX ADMIN — AMLODIPINE BESYLATE 5 MG: 5 TABLET ORAL at 08:20

## 2022-06-03 RX ADMIN — METOPROLOL SUCCINATE 25 MG: 25 TABLET, EXTENDED RELEASE ORAL at 08:20

## 2022-06-03 RX ADMIN — OXYCODONE HYDROCHLORIDE 10 MG: 10 TABLET ORAL at 05:10

## 2022-06-03 RX ADMIN — PANTOPRAZOLE SODIUM 40 MG: 40 INJECTION, POWDER, FOR SOLUTION INTRAVENOUS at 08:20

## 2022-06-03 RX ADMIN — AMLODIPINE BESYLATE 5 MG: 5 TABLET ORAL at 17:17

## 2022-06-03 RX ADMIN — HYDROMORPHONE HYDROCHLORIDE 0.2 MG: 0.2 INJECTION, SOLUTION INTRAMUSCULAR; INTRAVENOUS; SUBCUTANEOUS at 00:23

## 2022-06-03 RX ADMIN — MAGNESIUM SULFATE HEPTAHYDRATE 4 G: 40 INJECTION, SOLUTION INTRAVENOUS at 14:37

## 2022-06-03 RX ADMIN — METOPROLOL SUCCINATE 25 MG: 25 TABLET, EXTENDED RELEASE ORAL at 21:33

## 2022-06-03 NOTE — PLAN OF CARE
Problem: PAIN - ADULT  Goal: Verbalizes/displays adequate comfort level or baseline comfort level  Description: Interventions:  - Encourage patient to monitor pain and request assistance  - Assess pain using appropriate pain scale  - Administer analgesics based on type and severity of pain and evaluate response  - Implement non-pharmacological measures as appropriate and evaluate response  - Consider cultural and social influences on pain and pain management  - Notify physician/advanced practitioner if interventions unsuccessful or patient reports new pain  Outcome: Progressing     Problem: SAFETY ADULT  Goal: Patient will remain free of falls  Description: INTERVENTIONS:  - Educate patient/family on patient safety including physical limitations  - Instruct patient to call for assistance with activity   - Consult OT/PT to assist with strengthening/mobility   - Keep Call bell within reach  - Keep bed low and locked with side rails adjusted as appropriate  - Keep care items and personal belongings within reach  - Initiate and maintain comfort rounds  - Make Fall Risk Sign visible to staff  - Apply yellow socks and bracelet for high fall risk patients  Outcome: Progressing     Problem: GASTROINTESTINAL - ADULT  Goal: Minimal or absence of nausea and/or vomiting  Description: INTERVENTIONS:  - Administer IV fluids if ordered to ensure adequate hydration  - Maintain NPO status until nausea and vomiting are resolved  - Nasogastric tube if ordered  - Administer ordered antiemetic medications as needed  - Provide nonpharmacologic comfort measures as appropriate  - Advance diet as tolerated, if ordered  - Consider nutrition services referral to assist patient with adequate nutrition and appropriate food choices  Outcome: Progressing  Goal: Maintains or returns to baseline bowel function  Description: INTERVENTIONS:  - Assess bowel function  - Encourage oral fluids to ensure adequate hydration  - Administer IV fluids if ordered to ensure adequate hydration  - Administer ordered medications as needed  - Encourage mobilization and activity  - Consider nutritional services referral to assist patient with adequate nutrition and appropriate food choices  Outcome: Progressing  Goal: Maintains adequate nutritional intake  Description: INTERVENTIONS:  - Monitor percentage of each meal consumed  - Identify factors contributing to decreased intake, treat as appropriate  - Assist with meals as needed  - Monitor I&O, weight, and lab values if indicated  - Obtain nutrition services referral as needed  Outcome: Progressing

## 2022-06-03 NOTE — ED NOTES
Patient refused NG tube  Provider Ángel Mccartney at bedside when patient refused       Sal Medeiros RN  06/02/22 1400

## 2022-06-03 NOTE — PROGRESS NOTES
Pt emesis x1 medium, clear  Refusing NGT at this time  Educated pt that if she keeps vomiting, she will absolutely need an NGT  Zofran given, will continue to monitor  Report given to oncoming RN

## 2022-06-03 NOTE — PROGRESS NOTES
Progress Note - General Surgery   Dana Vale 66 y o  female MRN: [id number]  Unit/Bed#: S -01 Encounter: [id number]    Assessment:  66 y o  F with multiple prior SBOs, presents with a recurrent SBO  Afebrile  VSS  One episode of emesis overnight  She is passing flatus  Plan:  Suziuvcharlotte Dinger of clears  [email or web address] com  Prn analgesia/anti-emetics  OOB/Ambulate  DVT ppx         Subjective/Objective     Subjective: One episode of emesis overnight  She is passing flatus, but has not had a bowel movement yet  She has an appetite for clear liquids  Objective:     Blood pressure 124/64, pulse 67, temperature 98 5 °F (36 9 °C), resp  rate 16, height 5' 5" (1 651 m), weight 64 7 kg (142 lb 10 2 oz), SpO2 93 %  ,Body mass index is 23 74 kg/m²  Intake/Output Summary (Last 24 hours) at 6/3/2022 7875  Last data filed at 6/2/2022 1958  Gross per 24 hour   Intake 1000 ml   Output --   Net 1000 ml       Invasive Devices  Report    Peripheral Intravenous Line  Duration           Peripheral IV 06/02/22 Antecubital <1 day                Physical Exam:   NAD, alert and oriented x3  Normocephalic, atraumatic  MMM  Norm resp effort on room air   Regular rate  Abd soft, NT/ND, epigastric fat containing hernia without overlying tenderness or skin changes - partially reducible     No calf tenderness or peripheral edema  Motor/sensation intact in distal extremities  CN grossly intact  -rash/lesions      Lab, Imaging and other studies:  CBC:   Lab Results   Component Value Date    WBC 8 31 06/03/2022    HGB 13 2 06/03/2022    HCT 40 8 06/03/2022    MCV 97 06/03/2022     06/03/2022    MCH 31 2 06/03/2022    MCHC 32 4 06/03/2022    RDW 13 0 06/03/2022    MPV 10 3 06/03/2022    NRBC 0 06/03/2022   , CMP:   Lab Results   Component Value Date    SODIUM 138 06/03/2022    K 4 6 06/03/2022     06/03/2022    CO2 26 06/03/2022    BUN 15 06/03/2022    CREATININE 0 69 06/03/2022    CALCIUM 9 2 06/03/2022    AST 11 (L) 06/02/2022 ALT 8 06/02/2022    ALKPHOS 66 06/02/2022    EGFR 86 06/03/2022     VTE Pharmacologic Prophylaxis: Heparin  VTE Mechanical Prophylaxis: sequential compression device

## 2022-06-03 NOTE — PROGRESS NOTES
Progress Note - Erin Mejias 66 y o  female MRN: [id number]    Unit/Bed#: S -01 Encounter: [id number]      Assessment:  66 y o  F with multiple prior SBOs, presents with a recurrent SBO    Vss  Afebrile  abd s/ nt/ nd      Plan:  Advance diet as tolerated  Continue gentle ivf  Ambulate  dvt ppx    Subjective:   Feels well  No complaints  Wants to eat  Passing flatus  Denied fever, chills, chest pain, shortness of breath, nausea, vomiting, or abdominal pain this morning  Objective:     Vitals: Blood pressure 120/56, pulse (!) 50, temperature 98 5 °F (36 9 °C), resp  rate 16, height 5' 5" (1 651 m), weight 64 7 kg (142 lb 10 2 oz), SpO2 94 %  ,Body mass index is 23 74 kg/m²  Intake/Output Summary (Last 24 hours) at 6/4/2022 0814  Last data filed at 6/4/2022 0739  Gross per 24 hour   Intake 1050 ml   Output 1550 ml   Net -500 ml       Physical Exam  General: NAD  HEENT: NC/AT  MMM  Cv: RRR     Lungs: normal effort  Ab: Soft, NT/ND  Ex: no CCE  Neuro: AAOx3    Scheduled Meds:  Current Facility-Administered Medications   Medication Dose Route Frequency Provider Last Rate    acetaminophen  650 mg Oral Q6H PRN Verle Door, DO      amLODIPine  5 mg Oral BID Munira Guidry, DO      dextrose 5 % and sodium chloride 0 45 % with KCl 20 mEq/L  75 mL/hr Intravenous Continuous Verle Door, DO      heparin (porcine)  5,000 Units Subcutaneous [facility] Munria Guidry, DO      HYDROmorphone  0 2 mg Intravenous Q3H PRN Verle Door, DO      metoprolol succinate  25 mg Oral BID Munira Guidry, DO      ondansetron  4 mg Intravenous Q6H PRN Verle Door, DO      oxyCODONE  10 mg Oral Q6H PRN Verle Door, DO      oxyCODONE  5 mg Oral Q6H PRN Mireya Guidry, DO      pantoprazole  40 mg Intravenous Q24H [facility] Munira Guidry,        Continuous Infusions:dextrose 5 % and sodium chloride 0 45 % with KCl 20 mEq/L, 75 mL/hr      PRN Meds:   acetaminophen   HYDROmorphone    ondansetron    oxyCODONE    oxyCODONE      Invasive Devices  Report    Peripheral Intravenous Line  Duration           Peripheral IV 06/02/22 Antecubital 1 day                Lab, Imaging and other studies: I have personally reviewed pertinent reports      VTE Pharmacologic Prophylaxis: Sequential compression device (Venodyne)   VTE Mechanical Prophylaxis: sequential compression device

## 2022-06-03 NOTE — H&P
H&P Exam - General Surgery   Kumar De Los Santos 66 y o  female MRN: [id number]  Unit/Bed#: ED 13 Encounter: [id number]    Assessment/Plan     Assessment:  66 y o  F with multiple prior SBOs, presents with a recurrent SBO  Afebrile  VSS      Plan:  Recurrent SBO, likely partial, as she is still having bowel function  She has a benign abdominal exam  Will pursue conservative management at this time  General surgery admission  Bowel rest, will hold off on NGT at this time per patient's request  Will place NGT if she vomits  IVF resuscitation  Prn analgesia/antiemetics   OOB/Ambulate  DVT ppx       History of Present Illness   HPI:  Kumar De Los Santos is a 66 y o  female with PMHx of CKD, GERD, hypertension, Lyme disease, hysterectomy greater than 20 years ago complicated by recurrent small-bowel obstruction s/p ex lap ROSCOE 2021 at OSH, who presents with sudden onset of abdominal pain and nausea this afternoon  She has had multiple prior SBOs, most recently last month, and these symptoms are consistent with prior obstructions  She is still passing flatus and her last BM was this morning  She denies vomiting, fevers, chills, chest pain, SOB, cough or any other complaints at this time  Review of Systems   Constitutional: Negative for chills and fever  HENT: Negative for ear pain and sore throat  Eyes: Negative for pain and visual disturbance  Respiratory: Negative for cough and shortness of breath  Cardiovascular: Negative for chest pain and palpitations  Gastrointestinal: Positive for abdominal pain and nausea  Negative for vomiting  Genitourinary: Negative for dysuria and hematuria  Musculoskeletal: Negative for arthralgias and back pain  Skin: Negative for color change and rash  Neurological: Negative for seizures and syncope  All other systems reviewed and are negative        Historical Information   Past Medical History:   Diagnosis Date    Chronic kidney disease     GERD (gastroesophageal reflux disease)     Hypertension     Lyme disease      Past Surgical History:   Procedure Laterality Date    FL RETROGRADE PYELOGRAM  10/21/2021    FL RETROGRADE PYELOGRAM  2022    HYSTERECTOMY      [place] CYSTOURETHROSCOPY,URETER CATHETER Bilateral 10/21/2021    Procedure: CYSTOSCOPY RETROGRADE PYELOGRAM WITH INSERTION STENT URETERAL;  Surgeon: Funmilayo Maurice MD;  [facility];  Service: Urology    [place] CYSTOURETHROSCOPY,URETER CATHETER Left 2022    Procedure: CYSTOSCOPY RETROGRADE PYELOGRAM WITH INSERTION STENT URETERAL; DIAGNOSTIC URETEROSCOPY;  Surgeon: Funmilayo Maurice MD;  [facility];  Service: Urology     Social History   Social History     Substance and Sexual Activity   Alcohol Use Yes    Comment: socially     Social History     Substance and Sexual Activity   Drug Use No     Social History     Tobacco Use   Smoking Status Never Smoker   Smokeless Tobacco Never Used     E-Cigarette/Vaping    E-Cigarette Use Never User      E-Cigarette/Vaping Substances     Family History: non-contributory    Meds/Allergies   PTA meds:   Prior to Admission Medications   Prescriptions Last Dose Informant Patient Reported? Taking?   acetaminophen (TYLENOL) 325 mg tablet   No No   Sig: Take 2 tablets (650 mg total) by mouth every 4 (four) hours as needed for mild pain   amLODIPine (NORVASC) 5 mg tablet  Self Yes No   Sig: Take 5 mg by mouth 2 (two) times a day   metoprolol succinate (TOPROL-XL) 25 mg 24 hr tablet  Self Yes No   Sig: Take 25 mg by mouth 2 (two) times a day       Facility-Administered Medications: None     Allergies   Allergen Reactions    Bactrim [Sulfamethoxazole-Trimethoprim] Other (See Comments)     Her mom  from [address], Family histor?  Cefixime Other (See Comments)     30 years ago, unclear reaction   Believes allergy listed due to C Dif  Tolerated Ceftriaxone 10/21    Sulfa Antibiotics Other (See Comments)     Causes sores on skin - per pt Objective   First Vitals:   Blood Pressure: 152/70 (06/02/22 1739)  Pulse: 56 (06/02/22 1739)  Temperature: 98 °F (36 7 °C) (06/02/22 1739)  Temp Source: Oral (06/02/22 1739)  Respirations: 18 (06/02/22 1739)  SpO2: 97 % (06/02/22 1739)    Current Vitals:   Blood Pressure: 167/82 (06/02/22 1900)  Pulse: (!) 54 (06/02/22 1900)  Temperature: 98 °F (36 7 °C) (06/02/22 1739)  Temp Source: Oral (06/02/22 1739)  Respirations: 18 (06/02/22 1900)  SpO2: 99 % (06/02/22 1900)    No intake or output data in the 24 hours ending 06/02/22 2029    Invasive Devices  Report    Peripheral Intravenous Line  Duration           Peripheral IV 06/02/22 Antecubital <1 day                Physical Exam  Vitals and nursing note reviewed  Constitutional:       General: She is not in acute distress  Appearance: She is well-developed  She is not ill-appearing  HENT:      Head: Normocephalic and atraumatic  Mouth/Throat:      Mouth: Mucous membranes are moist    Eyes:      Conjunctiva/sclera: Conjunctivae normal    Cardiovascular:      Rate and Rhythm: Normal rate  Pulmonary:      Effort: Pulmonary effort is normal  No respiratory distress  Abdominal:      General: There is no distension  Palpations: Abdomen is soft  Tenderness: There is no abdominal tenderness  There is no guarding or rebound  Comments: Fat containing epigastric ventral hernia which is soft, nontender, partially reducible  No overlying skin changes  Musculoskeletal:      Cervical back: Neck supple  Right lower leg: No edema  Left lower leg: No edema  Skin:     General: Skin is warm and dry  Neurological:      General: No focal deficit present  Mental Status: She is alert     Psychiatric:         Mood and Affect: Mood normal          Behavior: Behavior normal          Lab Results:   CBC:   Lab Results   Component Value Date    WBC 5 46 06/02/2022    HGB 14 4 06/02/2022    HCT 44 6 06/02/2022    MCV 96 06/02/2022    PLT 314 06/02/2022    MCH 31 0 06/02/2022    MCHC 32 3 06/02/2022    RDW 12 7 06/02/2022    MPV 10 0 06/02/2022    NRBC 0 06/02/2022   , CMP:   Lab Results   Component Value Date    SODIUM 140 06/02/2022    K 4 1 06/02/2022     06/02/2022    CO2 27 06/02/2022    BUN 15 06/02/2022    CREATININE 0 83 06/02/2022    CALCIUM 10 2 06/02/2022    AST 11 (L) 06/02/2022    ALT 8 06/02/2022    ALKPHOS 66 06/02/2022    EGFR 70 06/02/2022     Imaging: I have personally reviewed pertinent reports  EKG, Pathology, and Other Studies: I have personally reviewed pertinent reports  Code Status: Prior  Advance Directive and Living Will:      Power of :    POLST:      Counseling / Coordination of Care  Total floor / unit time spent today 30 minutes  Greater than 50% of total time was spent with the patient and / or family counseling and / or coordination of care    A description of the counseling / coordination of care: discussion with patient and surgical team

## 2022-06-04 VITALS
WEIGHT: 142.64 LBS | RESPIRATION RATE: 16 BRPM | BODY MASS INDEX: 23.76 KG/M2 | SYSTOLIC BLOOD PRESSURE: 120 MMHG | HEART RATE: 50 BPM | OXYGEN SATURATION: 94 % | DIASTOLIC BLOOD PRESSURE: 56 MMHG | HEIGHT: 65 IN | TEMPERATURE: 98.5 F

## 2022-06-04 LAB
ANION GAP SERPL CALCULATED.3IONS-SCNC: 4 MMOL/L (ref 4–13)
BUN SERPL-MCNC: 9 MG/DL (ref 5–25)
CALCIUM SERPL-MCNC: 8.3 MG/DL (ref 8.4–10.2)
CHLORIDE SERPL-SCNC: 108 MMOL/L (ref 96–108)
CO2 SERPL-SCNC: 29 MMOL/L (ref 21–32)
CREAT SERPL-MCNC: 0.71 MG/DL (ref 0.6–1.3)
GFR SERPL CREATININE-BSD FRML MDRD: 84 ML/MIN/1.73SQ M
GLUCOSE SERPL-MCNC: 106 MG/DL (ref 65–140)
MAGNESIUM SERPL-MCNC: 2 MG/DL (ref 1.9–2.7)
PHOSPHATE SERPL-MCNC: 3 MG/DL (ref 2.3–4.1)
POTASSIUM SERPL-SCNC: 3.9 MMOL/L (ref 3.5–5.3)
SODIUM SERPL-SCNC: 141 MMOL/L (ref 135–147)

## 2022-06-04 PROCEDURE — 80048 BASIC METABOLIC PNL TOTAL CA: CPT | Performed by: PHYSICIAN ASSISTANT

## 2022-06-04 PROCEDURE — 83735 ASSAY OF MAGNESIUM: CPT | Performed by: PHYSICIAN ASSISTANT

## 2022-06-04 PROCEDURE — 99232 SBSQ HOSP IP/OBS MODERATE 35: CPT | Performed by: SURGERY

## 2022-06-04 PROCEDURE — C9113 INJ PANTOPRAZOLE SODIUM, VIA: HCPCS | Performed by: STUDENT IN AN ORGANIZED HEALTH CARE EDUCATION/TRAINING PROGRAM

## 2022-06-04 PROCEDURE — 84100 ASSAY OF PHOSPHORUS: CPT | Performed by: PHYSICIAN ASSISTANT

## 2022-06-04 RX ORDER — DEXTROSE, SODIUM CHLORIDE, AND POTASSIUM CHLORIDE 5; .45; .15 G/100ML; G/100ML; G/100ML
75 INJECTION INTRAVENOUS CONTINUOUS
Status: DISCONTINUED | OUTPATIENT
Start: 2022-06-04 | End: 2022-06-04 | Stop reason: HOSPADM

## 2022-06-04 RX ADMIN — AMLODIPINE BESYLATE 5 MG: 5 TABLET ORAL at 08:44

## 2022-06-04 RX ADMIN — PANTOPRAZOLE SODIUM 40 MG: 40 INJECTION, POWDER, FOR SOLUTION INTRAVENOUS at 08:44

## 2022-06-04 RX ADMIN — DEXTROSE, SODIUM CHLORIDE, AND POTASSIUM CHLORIDE 75 ML/HR: 5; .45; .15 INJECTION INTRAVENOUS at 08:44

## 2022-06-04 RX ADMIN — SODIUM CHLORIDE, SODIUM LACTATE, POTASSIUM CHLORIDE, AND CALCIUM CHLORIDE 125 ML/HR: .6; .31; .03; .02 INJECTION, SOLUTION INTRAVENOUS at 01:07

## 2022-06-04 RX ADMIN — METOPROLOL SUCCINATE 25 MG: 25 TABLET, EXTENDED RELEASE ORAL at 08:44

## 2022-06-04 NOTE — DISCHARGE INSTRUCTIONS
Please call the office when you leave to schedule an appointment for 2 weeks  Please call [phone]                      [address] [place] [address], [address], [place], [address]  Off of [address] between [facility] and [employer]  Activity:    As tolerated    Return to Work:   Okay to return to work when you feel well if you desire  ^^^^^^^^^^^^^^^^^^^^^^^^^^^^^^^^Diet: ^^^^^^^^^^^^^^^^^^^^^^^^^^^^^^^^^^^^^^^^^^^^^^^^^^^^^  You may have a full liquid diet initially, followed in a few days by a low residual diet  See below  Please avoid salads, raw vegetables, steak, pork chops for 1 month  Stay hydrated with at least 3 glasses of water or juice per day  Pain Medication:   Please take as directed if needed  May use Advil or Motrin in addition  Other:  If you have questions after discharge please call the office      If you have increased pain, fever >101 5, increased drainage, redness or a bad smell at your surgery site, please call us immediately or come directly to the Emergency Room

## 2022-06-04 NOTE — PLAN OF CARE
Problem: GASTROINTESTINAL - ADULT  Goal: Minimal or absence of nausea and/or vomiting  Description: INTERVENTIONS:  - Administer IV fluids if ordered to ensure adequate hydration  - Maintain NPO status until nausea and vomiting are resolved  - Nasogastric tube if ordered  - Administer ordered antiemetic medications as needed  - Provide nonpharmacologic comfort measures as appropriate  - Advance diet as tolerated, if ordered  - Consider nutrition services referral to assist patient with adequate nutrition and appropriate food choices  Outcome: Progressing  Goal: Maintains or returns to baseline bowel function  Description: INTERVENTIONS:  - Assess bowel function  - Encourage oral fluids to ensure adequate hydration  - Administer IV fluids if ordered to ensure adequate hydration  - Administer ordered medications as needed  - Encourage mobilization and activity  - Consider nutritional services referral to assist patient with adequate nutrition and appropriate food choices  Outcome: Progressing  Goal: Maintains adequate nutritional intake  Description: INTERVENTIONS:  - Monitor percentage of each meal consumed  - Identify factors contributing to decreased intake, treat as appropriate  - Assist with meals as needed  - Monitor I&O, weight, and lab values if indicated  - Obtain nutrition services referral as needed  Outcome: Progressing     Problem: PAIN - ADULT  Goal: Verbalizes/displays adequate comfort level or baseline comfort level  Description: Interventions:  - Encourage patient to monitor pain and request assistance  - Assess pain using appropriate pain scale  - Administer analgesics based on type and severity of pain and evaluate response  - Implement non-pharmacological measures as appropriate and evaluate response  - Consider cultural and social influences on pain and pain management  - Notify physician/advanced practitioner if interventions unsuccessful or patient reports new pain  Outcome: Progressing

## 2022-06-06 ENCOUNTER — TELEPHONE (OUTPATIENT)
Dept: OTHER | Facility: OTHER | Age: 72
End: 2022-06-06

## 2022-06-06 ENCOUNTER — HOSPITAL ENCOUNTER (OUTPATIENT)
Facility: AMBULARY SURGERY CENTER | Age: 72
Setting detail: OUTPATIENT SURGERY
End: 2022-06-06
Attending: UROLOGY | Admitting: UROLOGY
Payer: MEDICARE

## 2022-06-06 ENCOUNTER — PREP FOR PROCEDURE (OUTPATIENT)
Dept: UROLOGY | Facility: CLINIC | Age: 72
End: 2022-06-06

## 2022-06-06 DIAGNOSIS — N13.5 OBSTRUCTION OF URETER, UNSPECIFIED LATERALITY: Primary | ICD-10-CM

## 2022-06-06 NOTE — TELEPHONE ENCOUNTER
I placed case request and will reach out to patient in July in re to mailing packet and confirming date

## 2022-06-06 NOTE — TELEPHONE ENCOUNTER
Patient would like a call back to schedule a post-op appointment  She would also like care advise on what type of diet she should be on

## 2022-06-09 NOTE — PHYSICIAN ADVISOR
Current patient class: Inpatient  The patient is currently on Hospital Day: 3 at [address]      The patient was admitted to the hospital at  2:30 PM on 6/3/22 for the following diagnosis:  Abdominal pain [R10 9]       There was documentation in the medical record of an expected length of stay of at least 2 midnights  The patient was therefore expected to satisfy the 2 midnight benchmark and given the 2 midnight presumption was appropriate for INPATIENT ADMISSION  Given this expectation of a satisfying stay, CMS instructs us that the patient is most often appropriate for inpatient admission under part A provided medical necessity is documented in the chart  After review of the relevant documentation, labs, vital signs and test results, the patient is appropriate for INPATIENT ADMISSION  Admission to the hospital as an inpatient is a complex decision making process which requires the practitioner to consider the patients presenting complaint, history and physical examination and all relevant testing  With this in mind, in this case, the patient was deemed appropriate for INPATIENT ADMISSION  After review of the documentation and testing available at the time of the admission, I concur with this clinical determination of medical necessity  For the reason noted, the patient was discharged before reaching 2 midnights as an inpatient  Rationale is as follows: The patient is a 66 yrs old Female who presented to the ED at 6/2/2022  6:17 PM with a chief complaint of Abdominal Pain (Abd pain and nausea  Hx of obstruction 2 weeks ago)   Patient came in with small-bowel obstruction  Patient does have history of this  Patient was seen in consultation by surgery and on day 2 admission the patient was only getting sips of clears and did have an episode of vomiting    Given the above the patient required further hospitalization and did cross the 2 midnight benchmark and is inpatient status appropriate while receiving ongoing acute inpatient medical care  The patients vitals on arrival were   ED Triage Vitals   Temperature Pulse Respirations Blood Pressure SpO2   06/02/22 1739 06/02/22 1739 06/02/22 1739 06/02/22 1739 06/02/22 1739   98 °F (36 7 °C) 56 18 152/70 97 %      Temp Source Heart Rate Source Patient Position - Orthostatic VS BP Location FiO2 (%)   06/02/22 1739 06/02/22 1900 06/02/22 1739 06/02/22 1739 --   Oral Monitor Sitting Left arm       Pain Score       06/02/22 1900       7           Past Medical History:   Diagnosis Date    Chronic kidney disease     GERD (gastroesophageal reflux disease)     Hypertension     Lyme disease      Past Surgical History:   Procedure Laterality Date    FL RETROGRADE PYELOGRAM  10/21/2021    FL RETROGRADE PYELOGRAM  2/25/2022    HYSTERECTOMY      [place] CYSTOURETHROSCOPY,URETER CATHETER Bilateral 10/21/2021    Procedure: CYSTOSCOPY RETROGRADE PYELOGRAM WITH INSERTION STENT URETERAL;  Surgeon: Scarlett Arrington MD;  [facility];  Service: Urology    [place] [address] Left 2/25/2022    Procedure: CYSTOSCOPY RETROGRADE PYELOGRAM WITH INSERTION STENT URETERAL; DIAGNOSTIC URETEROSCOPY;  Surgeon: Scarlett Arrington MD;  [facility];  Service: Urology           Consults have been placed to:   None    Vitals:    06/03/22 1718 06/03/22 2100 06/03/22 2132 06/04/22 0805   BP: 112/61  118/62 120/56   BP Location:   Right arm    Pulse: 61  56 (!) 50   Resp:   16    Temp:   98 7 °F (37 1 °C) 98 5 °F (36 9 °C)   TempSrc:   Oral    SpO2: 90% 90% 90% 94%   Weight:       Height:           Most recent labs:    No results for input(s): WBC, HGB, HCT, PLT, K, NA, CALCIUM, BUN, CREATININE, LIPASE, AMYLASE, INR, TROPONINI, CKTOTAL, AST, ALT, ALKPHOS, BILITOT in the last 72 hours  Scheduled Meds:  Continuous Infusions:No current facility-administered medications for this encounter  PRN Meds:      Surgical procedures (if appropriate):

## 2022-06-20 ENCOUNTER — OFFICE VISIT (OUTPATIENT)
Dept: SURGERY | Facility: CLINIC | Age: 72
End: 2022-06-20
Payer: MEDICARE

## 2022-06-20 DIAGNOSIS — K56.609 BOWEL OBSTRUCTION (HCC): Primary | ICD-10-CM

## 2022-06-20 PROCEDURE — 99212 OFFICE O/P EST SF 10 MIN: CPT | Performed by: SURGERY

## 2022-06-20 NOTE — PROGRESS NOTES
Assessment/Plan:  Patient was recently hospitalized for recurrent small-bowel obstruction  She had adhesiolysis last year revealing work hospital   She states that she is feeling well on a soft diet  He denies pain  Bowel movements are normal       I recommended decreasing roughage in her diet  We discussed about foods in detail  All questions answered  There are no diagnoses linked to this encounter  Subjective:      Patient ID: Kacy Go is a 66 y o  female  Presents for hospital follow up  Inpatient 6/2/2022- 6/4/2022 for small bowel obstruction  Doing well, denies abdominal pain or bowel issues since discharge  Following soft diet  The following portions of the patient's history were reviewed and updated as appropriate:     She  has a past medical history of Chronic kidney disease, GERD (gastroesophageal reflux disease), Hypertension, and Lyme disease  She  has a past surgical history that includes Hysterectomy; pr cystourethroscopy,ureter catheter (Bilateral, 10/21/2021); FL retrograde pyelogram (10/21/2021); FL retrograde pyelogram (2/25/2022); and pr cystourethroscopy,ureter catheter (Left, 2/25/2022)  Her family history includes Stroke in her father  She  reports that she has never smoked  She has never used smokeless tobacco  She reports current alcohol use  She reports that she does not use drugs  Current Outpatient Medications   Medication Sig Dispense Refill    acetaminophen (TYLENOL) 325 mg tablet Take 2 tablets (650 mg total) by mouth every 4 (four) hours as needed for mild pain 30 tablet 0    amLODIPine (NORVASC) 5 mg tablet Take 5 mg by mouth 2 (two) times a day      metoprolol succinate (TOPROL-XL) 25 mg 24 hr tablet Take 25 mg by mouth 2 (two) times a day        No current facility-administered medications for this visit  She is allergic to bactrim [sulfamethoxazole-trimethoprim], cefixime, and sulfa antibiotics       Review of Systems   Constitutional: Negative  Negative for activity change  HENT: Negative  Eyes: Negative  Respiratory: Negative  Cardiovascular: Negative  Gastrointestinal: Negative  Endocrine: Negative  Genitourinary: Negative  Musculoskeletal: Negative  Skin: Negative  Allergic/Immunologic: Negative  Neurological: Negative  Psychiatric/Behavioral: Negative for agitation, behavioral problems and confusion  The patient is not nervous/anxious  Objective: There were no vitals taken for this visit  Physical Exam  Constitutional:       Appearance: Normal appearance  She is well-developed  HENT:      Head: Normocephalic and atraumatic  Nose: Nose normal    Eyes:      Extraocular Movements: Extraocular movements intact  Conjunctiva/sclera: Conjunctivae normal    Cardiovascular:      Rate and Rhythm: Normal rate  Pulmonary:      Effort: Pulmonary effort is normal    Abdominal:      General: Abdomen is flat  Skin:     General: Skin is warm  Neurological:      Mental Status: She is alert and oriented to person, place, and time     Psychiatric:         Mood and Affect: Mood normal

## 2022-06-30 ENCOUNTER — PREP FOR PROCEDURE (OUTPATIENT)
Dept: UROLOGY | Facility: CLINIC | Age: 72
End: 2022-06-30

## 2022-06-30 DIAGNOSIS — N13.5 UPJ (URETEROPELVIC JUNCTION) OBSTRUCTION: ICD-10-CM

## 2022-06-30 DIAGNOSIS — N13.39 OTHER HYDRONEPHROSIS: Primary | ICD-10-CM

## 2022-06-30 NOTE — TELEPHONE ENCOUNTER
KIM informing patient that I am mailing her surgical packet for her 8/26 stent exchange  Asked to william hahn any questions

## 2022-07-26 NOTE — TELEPHONE ENCOUNTER
Pt called and stated she had 2 CT done in may and June and is asking if she needs to have another CT done before her upcoming appt     Pt also stated that she needs a new surgery package mailed to her she missed placed the original package    Pt call [facility]-[phone]

## 2022-07-26 NOTE — TELEPHONE ENCOUNTER
Patient calling back  Patient does not need the surgery packet mailed to her, she found the original     No action needed

## 2022-08-02 NOTE — TELEPHONE ENCOUNTER
Advised patient that per providers an updated CT approximately a month before next stent exchange is advised  She would prefer not getting one but I advised her that Dr Justine Rowland recommends one  Patient also questioning why she needs a pre op appointment, as she has not had one previously  Advised that we haven't seen patient in almost a year and an updated h&p is recommended prior to any procedure

## 2022-08-02 NOTE — TELEPHONE ENCOUNTER
Pt called and stated she did not receive a c/b regarding her CT scan if it is neceessary due to pt having 1 complete in June and also would like to know the reason for her upcoming appt Pt is requesting for advise from Dr Dayna Sue    Pt call [phone]

## 2022-08-05 ENCOUNTER — HOSPITAL ENCOUNTER (OUTPATIENT)
Dept: CT IMAGING | Facility: HOSPITAL | Age: 72
Discharge: HOME | End: 2022-08-05
Attending: UROLOGY
Payer: MEDICARE

## 2022-08-05 DIAGNOSIS — N13.5 UPJ (URETEROPELVIC JUNCTION) OBSTRUCTION: ICD-10-CM

## 2022-08-05 PROCEDURE — 74178 CT ABD&PLV WO CNTR FLWD CNTR: CPT

## 2022-08-05 PROCEDURE — G1004 CDSM NDSC: HCPCS

## 2022-08-05 RX ADMIN — IOHEXOL 70 ML: 350 INJECTION, SOLUTION INTRAVENOUS at 10:55

## 2022-08-15 ENCOUNTER — OFFICE VISIT (OUTPATIENT)
Dept: UROLOGY | Facility: CLINIC | Age: 72
End: 2022-08-15
Payer: MEDICARE

## 2022-08-15 VITALS
WEIGHT: 144 LBS | HEART RATE: 64 BPM | DIASTOLIC BLOOD PRESSURE: 52 MMHG | BODY MASS INDEX: 23.99 KG/M2 | HEIGHT: 65 IN | SYSTOLIC BLOOD PRESSURE: 126 MMHG | OXYGEN SATURATION: 96 %

## 2022-08-15 DIAGNOSIS — N13.5 UPJ (URETEROPELVIC JUNCTION) OBSTRUCTION: Primary | ICD-10-CM

## 2022-08-15 PROCEDURE — 99213 OFFICE O/P EST LOW 20 MIN: CPT | Performed by: PHYSICIAN ASSISTANT

## 2022-08-15 PROCEDURE — 87086 URINE CULTURE/COLONY COUNT: CPT | Performed by: PHYSICIAN ASSISTANT

## 2022-08-15 NOTE — PROGRESS NOTES
1  UPJ (ureteropelvic junction) obstruction  Urine culture     Assessment and plan:     1  UPJ obstruction  -I reviewed with the patient her upcoming ureteral stent exchange  We discussed which she may either have traditional stents versus metallic stents based off of pre-surgical findings  -urine specimen from the office today will be submitted for culture   -risks of the procedure reviewed including but not limited to cardiopulmonary complication, VTE, bleeding, infection, ureteral stricture, need for additional sac changes, need for possible nephrostomy tube  Patient verbalized understanding  Consent signed in the office today  Kali Benitez PA-C      Chief Complaint     Chief Complaint   Patient presents with    H&P     History of Present Illness     Yohan Giles is a 66 y o  female presenting today for follow up  Known left UPJ obstruction, undergoing routine stent exchanges  Avoiding pyeloplasty given poor function with severe hydronephrosis and age  Tolerating stent well  Scheduled for next exchange 8/16/22  Patient had a recent CT renal protocol 08/05/2022 revealing moderate left pelvocaliectasis, increased from June 2022  Patient is asymptomatic  Medical comorbidities include GERD, bowel obstructions, HTN, CKD  Laboratory     Lab Results   Component Value Date    CREATININE 0 71 06/04/2022       Review of Systems     Review of Systems   Constitutional: Negative for activity change, appetite change, chills, diaphoresis, fatigue, fever and unexpected weight change  Respiratory: Negative for chest tightness and shortness of breath  Cardiovascular: Negative for chest pain, palpitations and leg swelling  Gastrointestinal: Negative for abdominal distention, abdominal pain, constipation, diarrhea, nausea and vomiting     Genitourinary: Negative for decreased urine volume, difficulty urinating, dysuria, enuresis, flank pain, frequency, genital sores, hematuria and urgency  Musculoskeletal: Negative for back pain, gait problem and myalgias  Skin: Negative for color change, pallor, rash and wound  Psychiatric/Behavioral: Negative for behavioral problems  The patient is not nervous/anxious  Allergies     Allergies   Allergen Reactions    Bactrim [Sulfamethoxazole-Trimethoprim] Other (See Comments)     Her mom  from [address], Family histor?  Cefixime Other (See Comments)     30 years ago, unclear reaction  Believes allergy listed due to C Dif  Tolerated Ceftriaxone 10/21    Sulfa Antibiotics Other (See Comments)     Causes sores on skin - per pt       Physical Exam     Physical Exam  Constitutional:       General: She is not in acute distress  Appearance: Normal appearance  She is normal weight  She is not ill-appearing, toxic-appearing or diaphoretic  HENT:      Head: Normocephalic and atraumatic  Eyes:      General:         Right eye: No discharge  Left eye: No discharge  Conjunctiva/sclera: Conjunctivae normal    Cardiovascular:      Rate and Rhythm: Normal rate and regular rhythm  Heart sounds: Normal heart sounds  No murmur heard  No friction rub  Pulmonary:      Effort: Pulmonary effort is normal  No respiratory distress  Breath sounds: Normal breath sounds  No stridor  Musculoskeletal:         General: No swelling or tenderness  Normal range of motion  Skin:     General: Skin is warm and dry  Coloration: Skin is not jaundiced or pale  Neurological:      General: No focal deficit present  Mental Status: She is alert and oriented to person, place, and time  Psychiatric:         Mood and Affect: Mood normal          Behavior: Behavior normal          Thought Content:  Thought content normal            Vital Signs     Vitals:    08/15/22 1421   BP: 126/52   Pulse: 64   SpO2: 96%   Weight: 65 3 kg (144 lb)   Height: 5' 5" (1 651 m)         Current Medications       Current Outpatient Medications:    amLODIPine (NORVASC) 5 mg tablet, Take 5 mg by mouth 2 (two) times a day, Disp: , Rfl:     Ibuprofen (ADVIL PO), Take by mouth, Disp: , Rfl:     metoprolol succinate (TOPROL-XL) 25 mg 24 hr tablet, Take 25 mg by mouth 2 (two) times a day , Disp: , Rfl:     acetaminophen (TYLENOL) 325 mg tablet, Take 2 tablets (650 mg total) by mouth every 4 (four) hours as needed for mild pain (Patient not taking: Reported on 8/15/2022), Disp: 30 tablet, Rfl: 0      Active Problems     Patient Active Problem List   Diagnosis    Recurrent UTI     Bilateral Hydronephrosis with left UPJ obstruction    Hypertension    Abnormal urinalysis    Leukocytosis    Bowel obstruction (HCC)       Past Medical History     Past Medical History:   Diagnosis Date    Chronic kidney disease     GERD (gastroesophageal reflux disease)     Hypertension     Lyme disease        Surgical History     Past Surgical History:   Procedure Laterality Date    FL RETROGRADE PYELOGRAM  10/21/2021    FL RETROGRADE PYELOGRAM  2/25/2022    HYSTERECTOMY      NC CYSTOURETHROSCOPY,URETER CATHETER Bilateral 10/21/2021    Procedure: CYSTOSCOPY RETROGRADE PYELOGRAM WITH INSERTION STENT URETERAL;  Surgeon: Thelma Mclaughlin MD;  [facility];  Service: Urology    NC CYSTOURETHROSCOPY,URETER CATHETER Left 2/25/2022    Procedure: CYSTOSCOPY RETROGRADE PYELOGRAM WITH INSERTION STENT URETERAL; DIAGNOSTIC URETEROSCOPY;  Surgeon: Thelma Mclaughlin MD;  Location: [facility];  Service: Urology         Family History     Family History   Problem Relation Age of Onset    Stroke Father          Social History     Social History       Radiology

## 2022-08-17 LAB — BACTERIA UR CULT: NORMAL

## 2022-08-21 ENCOUNTER — APPOINTMENT (EMERGENCY)
Dept: CT IMAGING | Facility: HOSPITAL | Age: 72
DRG: 660 | End: 2022-08-21
Payer: MEDICARE

## 2022-08-21 ENCOUNTER — HOSPITAL ENCOUNTER (INPATIENT)
Facility: HOSPITAL | Age: 72
LOS: 2 days | Discharge: HOME | DRG: 660 | End: 2022-08-23
Attending: EMERGENCY MEDICINE | Admitting: INTERNAL MEDICINE
Payer: MEDICARE

## 2022-08-21 DIAGNOSIS — N13.30 HYDRONEPHROSIS OF LEFT KIDNEY: Primary | ICD-10-CM

## 2022-08-21 DIAGNOSIS — N39.0 UTI (URINARY TRACT INFECTION): ICD-10-CM

## 2022-08-21 DIAGNOSIS — N13.30 HYDRONEPHROSIS: ICD-10-CM

## 2022-08-21 DIAGNOSIS — N39.0 RECURRENT UTI: ICD-10-CM

## 2022-08-21 PROBLEM — N18.2 STAGE 2 CHRONIC KIDNEY DISEASE: Chronic | Status: ACTIVE | Noted: 2022-08-21

## 2022-08-21 PROBLEM — I35.1 NONRHEUMATIC AORTIC VALVE INSUFFICIENCY: Status: ACTIVE | Noted: 2021-07-16

## 2022-08-21 LAB
ANION GAP SERPL CALCULATED.3IONS-SCNC: 7 MMOL/L (ref 4–13)
BACTERIA UR QL AUTO: ABNORMAL /HPF
BASOPHILS # BLD AUTO: 0.05 THOUSANDS/ΜL (ref 0–0.1)
BASOPHILS NFR BLD AUTO: 1 % (ref 0–1)
BILIRUB UR QL STRIP: NEGATIVE
BUN SERPL-MCNC: 10 MG/DL (ref 5–25)
CALCIUM SERPL-MCNC: 9.5 MG/DL (ref 8.4–10.2)
CHLORIDE SERPL-SCNC: 105 MMOL/L (ref 96–108)
CLARITY UR: ABNORMAL
CO2 SERPL-SCNC: 27 MMOL/L (ref 21–32)
COLOR UR: YELLOW
CREAT SERPL-MCNC: 0.76 MG/DL (ref 0.6–1.3)
EOSINOPHIL # BLD AUTO: 0.1 THOUSAND/ΜL (ref 0–0.61)
EOSINOPHIL NFR BLD AUTO: 2 % (ref 0–6)
ERYTHROCYTE [DISTWIDTH] IN BLOOD BY AUTOMATED COUNT: 13.3 % (ref 11.6–15.1)
GFR SERPL CREATININE-BSD FRML MDRD: 78 ML/MIN/1.73SQ M
GLUCOSE SERPL-MCNC: 92 MG/DL (ref 65–140)
GLUCOSE UR STRIP-MCNC: NEGATIVE MG/DL
HCT VFR BLD AUTO: 43.5 % (ref 34.8–46.1)
HGB BLD-MCNC: 14.3 G/DL (ref 11.5–15.4)
HGB UR QL STRIP.AUTO: ABNORMAL
IMM GRANULOCYTES # BLD AUTO: 0.03 THOUSAND/UL (ref 0–0.2)
IMM GRANULOCYTES NFR BLD AUTO: 1 % (ref 0–2)
KETONES UR STRIP-MCNC: NEGATIVE MG/DL
LEUKOCYTE ESTERASE UR QL STRIP: ABNORMAL
LYMPHOCYTES # BLD AUTO: 1.02 THOUSANDS/ΜL (ref 0.6–4.47)
LYMPHOCYTES NFR BLD AUTO: 16 % (ref 14–44)
MCH RBC QN AUTO: 31.9 PG (ref 26.8–34.3)
MCHC RBC AUTO-ENTMCNC: 32.9 G/DL (ref 31.4–37.4)
MCV RBC AUTO: 97 FL (ref 82–98)
MONOCYTES # BLD AUTO: 0.77 THOUSAND/ΜL (ref 0.17–1.22)
MONOCYTES NFR BLD AUTO: 12 % (ref 4–12)
NEUTROPHILS # BLD AUTO: 4.57 THOUSANDS/ΜL (ref 1.85–7.62)
NEUTS SEG NFR BLD AUTO: 68 % (ref 43–75)
NITRITE UR QL STRIP: NEGATIVE
NON-SQ EPI CELLS URNS QL MICRO: ABNORMAL /HPF
NRBC BLD AUTO-RTO: 0 /100 WBCS
PH UR STRIP.AUTO: 6 [PH]
PLATELET # BLD AUTO: 223 THOUSANDS/UL (ref 149–390)
PMV BLD AUTO: 9.8 FL (ref 8.9–12.7)
POTASSIUM SERPL-SCNC: 3.8 MMOL/L (ref 3.5–5.3)
PROT UR STRIP-MCNC: ABNORMAL MG/DL
RBC # BLD AUTO: 4.48 MILLION/UL (ref 3.81–5.12)
RBC #/AREA URNS AUTO: ABNORMAL /HPF
SODIUM SERPL-SCNC: 139 MMOL/L (ref 135–147)
SP GR UR STRIP.AUTO: 1.01 (ref 1–1.03)
UROBILINOGEN UR STRIP-ACNC: <2 MG/DL
WBC # BLD AUTO: 6.54 THOUSAND/UL (ref 4.31–10.16)
WBC #/AREA URNS AUTO: ABNORMAL /HPF
WBC CLUMPS # UR AUTO: PRESENT /UL

## 2022-08-21 PROCEDURE — 99285 EMERGENCY DEPT VISIT HI MDM: CPT | Performed by: EMERGENCY MEDICINE

## 2022-08-21 PROCEDURE — 99285 EMERGENCY DEPT VISIT HI MDM: CPT

## 2022-08-21 PROCEDURE — 80048 BASIC METABOLIC PNL TOTAL CA: CPT | Performed by: EMERGENCY MEDICINE

## 2022-08-21 PROCEDURE — 99222 1ST HOSP IP/OBS MODERATE 55: CPT | Performed by: UROLOGY

## 2022-08-21 PROCEDURE — 99223 1ST HOSP IP/OBS HIGH 75: CPT | Performed by: INTERNAL MEDICINE

## 2022-08-21 PROCEDURE — G1004 CDSM NDSC: HCPCS

## 2022-08-21 PROCEDURE — 87086 URINE CULTURE/COLONY COUNT: CPT | Performed by: EMERGENCY MEDICINE

## 2022-08-21 PROCEDURE — 87077 CULTURE AEROBIC IDENTIFY: CPT | Performed by: EMERGENCY MEDICINE

## 2022-08-21 PROCEDURE — 85025 COMPLETE CBC W/AUTO DIFF WBC: CPT | Performed by: EMERGENCY MEDICINE

## 2022-08-21 PROCEDURE — 74176 CT ABD & PELVIS W/O CONTRAST: CPT

## 2022-08-21 PROCEDURE — 96375 TX/PRO/DX INJ NEW DRUG ADDON: CPT

## 2022-08-21 PROCEDURE — 96365 THER/PROPH/DIAG IV INF INIT: CPT

## 2022-08-21 PROCEDURE — 36415 COLL VENOUS BLD VENIPUNCTURE: CPT | Performed by: EMERGENCY MEDICINE

## 2022-08-21 PROCEDURE — 81001 URINALYSIS AUTO W/SCOPE: CPT | Performed by: EMERGENCY MEDICINE

## 2022-08-21 PROCEDURE — 87186 SC STD MICRODIL/AGAR DIL: CPT | Performed by: EMERGENCY MEDICINE

## 2022-08-21 RX ORDER — OXYCODONE HYDROCHLORIDE 5 MG/1
2.5 TABLET ORAL EVERY 6 HOURS PRN
Status: DISCONTINUED | OUTPATIENT
Start: 2022-08-21 | End: 2022-08-23 | Stop reason: HOSPADM

## 2022-08-21 RX ORDER — CEFTRIAXONE 1 G/50ML
1000 INJECTION, SOLUTION INTRAVENOUS EVERY 24 HOURS
Status: DISCONTINUED | OUTPATIENT
Start: 2022-08-22 | End: 2022-08-23 | Stop reason: HOSPADM

## 2022-08-21 RX ORDER — AMOXICILLIN 250 MG
1 CAPSULE ORAL
Status: DISCONTINUED | OUTPATIENT
Start: 2022-08-21 | End: 2022-08-23 | Stop reason: HOSPADM

## 2022-08-21 RX ORDER — CEFTRIAXONE 1 G/50ML
1000 INJECTION, SOLUTION INTRAVENOUS ONCE
Status: COMPLETED | OUTPATIENT
Start: 2022-08-21 | End: 2022-08-21

## 2022-08-21 RX ORDER — HYDROMORPHONE HCL IN WATER/PF 6 MG/30 ML
0.2 PATIENT CONTROLLED ANALGESIA SYRINGE INTRAVENOUS EVERY 4 HOURS PRN
Status: DISCONTINUED | OUTPATIENT
Start: 2022-08-21 | End: 2022-08-23 | Stop reason: HOSPADM

## 2022-08-21 RX ORDER — AMLODIPINE BESYLATE 5 MG/1
5 TABLET ORAL 2 TIMES DAILY
Status: DISCONTINUED | OUTPATIENT
Start: 2022-08-21 | End: 2022-08-23 | Stop reason: HOSPADM

## 2022-08-21 RX ORDER — ACETAMINOPHEN 325 MG/1
975 TABLET ORAL EVERY 8 HOURS PRN
Status: DISCONTINUED | OUTPATIENT
Start: 2022-08-21 | End: 2022-08-23 | Stop reason: HOSPADM

## 2022-08-21 RX ORDER — AMLODIPINE BESYLATE 5 MG/1
5 TABLET ORAL 2 TIMES DAILY
Status: DISCONTINUED | OUTPATIENT
Start: 2022-08-21 | End: 2022-08-21

## 2022-08-21 RX ORDER — OXYCODONE HYDROCHLORIDE 5 MG/1
5 TABLET ORAL EVERY 6 HOURS PRN
Status: DISCONTINUED | OUTPATIENT
Start: 2022-08-21 | End: 2022-08-23 | Stop reason: HOSPADM

## 2022-08-21 RX ORDER — METOPROLOL SUCCINATE 25 MG/1
25 TABLET, EXTENDED RELEASE ORAL 2 TIMES DAILY
Status: DISCONTINUED | OUTPATIENT
Start: 2022-08-21 | End: 2022-08-23 | Stop reason: HOSPADM

## 2022-08-21 RX ORDER — HEPARIN SODIUM 5000 [USP'U]/ML
5000 INJECTION, SOLUTION INTRAVENOUS; SUBCUTANEOUS EVERY 8 HOURS SCHEDULED
Status: DISCONTINUED | OUTPATIENT
Start: 2022-08-21 | End: 2022-08-23 | Stop reason: HOSPADM

## 2022-08-21 RX ADMIN — OXYCODONE HYDROCHLORIDE 5 MG: 5 TABLET ORAL at 18:28

## 2022-08-21 RX ADMIN — AMLODIPINE BESYLATE 5 MG: 5 TABLET ORAL at 22:42

## 2022-08-21 RX ADMIN — METOPROLOL SUCCINATE 25 MG: 25 TABLET, EXTENDED RELEASE ORAL at 22:42

## 2022-08-21 RX ADMIN — MORPHINE SULFATE 2 MG: 2 INJECTION, SOLUTION INTRAMUSCULAR; INTRAVENOUS at 14:01

## 2022-08-21 RX ADMIN — CEFTRIAXONE 1000 MG: 1 INJECTION, SOLUTION INTRAVENOUS at 11:36

## 2022-08-21 RX ADMIN — SENNOSIDES AND DOCUSATE SODIUM 1 TABLET: 8.6; 5 TABLET ORAL at 22:42

## 2022-08-21 NOTE — ASSESSMENT & PLAN NOTE
Patient presents with foggy urine, lower abdominal pressure, dysuria, increased urinary frequency, chills, foul smelling urine x 1 day and a history of chronic UTIs  Patient is s/p L UPJ placement in 10/21/21 and s/p stent revision 2/25/22 with next revision scheduled for 8/26/22  Physical exam reveals lower abdominal tenderness to palpation but no CVA tenderness  Afebrile on admission with no leukocytosis  UA on admission:     Results from last 7 days   Lab Units 08/21/22  1035   LEUKOCYTES UA  Large*   NITRITE UA  Negative   GLUCOSE UA mg/dl Negative   KETONES UA mg/dl Negative   BLOOD UA  Large*   WBC UA /hpf Innumerable*   RBC UA /hpf 30-50*   BACTERIA UA /hpf Moderate*       Assessment:   - Complicated UTI in postmenopausal female w/ L UPJ stent but currently not showing signs or symptoms of urosepsis or pyelonephritis       Plan:   - monitor vital signs for evidence of sepsis   - monitor daily CBC for evidence of infection   - urine cultures pending   - patient started on rocephin in ED, continued on admission q24h (D1)  - serial abdominal exams - follow for evidence of pyelonephritis   - consider starting carbepenem if cultures positive for ESBL   - no previous UTI culture data found on chart review

## 2022-08-21 NOTE — ASSESSMENT & PLAN NOTE
Lab Results   Component Value Date    EGFR 78 08/21/2022    EGFR 84 06/04/2022    EGFR 86 06/03/2022    EGFR 70 06/02/2022    EGFR 90 05/20/2022    CREATININE 0 76 08/21/2022    CREATININE 0 71 06/04/2022    CREATININE 0 69 06/03/2022    CREATININE 0 83 06/02/2022    CREATININE 0 61 05/20/2022    BUN 10 08/21/2022    BUN 9 06/04/2022    BUN 15 06/03/2022    BUN 15 06/02/2022    BUN 8 05/20/2022     Patient has history of chronic UTI, chronic hydronephrosis   Renal currently function at patient's baseline       Assessment:   - Patient w/ stable stage II CKD at baseline presents w/ concern for UTI and hydroureter s/p UPJ stent, but without concern for CHANDNI at this time       Plan:   - monitor kidney function with daily BMP  - avoid nephrotoxic medications   - consider nephrology consult, if needed

## 2022-08-21 NOTE — LETTER
Thank you for allowing us to participate in the care of your patient, Odalis Cain, who was hospitalized from 8/21/2022 through 8/23/2022 with the admitting diagnosis of urinary tract infection  Her UPJ stent was replaced and she is being discharged to home today in stable condition  If you have any additional questions or would like to discuss further, please feel free to contact me      Citlaly Garcia MD  [place] [address] Internal Medicine, Hospitalist  [phone]

## 2022-08-21 NOTE — ED PROVIDER NOTES
History  Chief Complaint   Patient presents with    Possible UTI     Pt presents with sx of uti that started yesterday  , states she is prone to them  Has stent exchange scheduled with [facility] on the 26th  Reports frequency, urgency, burning and cloudy urine     HPI     58-year-old female with history of left UPJ obstruction with ureteral stent in place, followed by Urology, presents for evaluation of left-sided abdominal pain with cloudy urine  Symptoms started last night, got worse this morning  Accompanied by urinary frequency with urgency and burning  She is scheduled for routine stent exchange in 5 days  Denies fever, chills, nausea, vomiting, difficulty urinating, hematuria, or change in her stool  This feels similar to UTIs that she has had in the past     Prior to Admission Medications   Prescriptions Last Dose Informant Patient Reported? Taking?    Ibuprofen (ADVIL PO)   Yes No   Sig: Take by mouth   acetaminophen (TYLENOL) 325 mg tablet   No No   Sig: Take 2 tablets (650 mg total) by mouth every 4 (four) hours as needed for mild pain   Patient not taking: Reported on 8/15/2022   amLODIPine (NORVASC) 5 mg tablet  Self Yes No   Sig: Take 5 mg by mouth 2 (two) times a day   metoprolol succinate (TOPROL-XL) 25 mg 24 hr tablet  Self Yes No   Sig: Take 25 mg by mouth 2 (two) times a day       Facility-Administered Medications: None       Past Medical History:   Diagnosis Date    Chronic kidney disease     GERD (gastroesophageal reflux disease)     Hypertension     Lyme disease        Past Surgical History:   Procedure Laterality Date    FL RETROGRADE PYELOGRAM  10/21/2021    FL RETROGRADE PYELOGRAM  2/25/2022    HYSTERECTOMY      MS CYSTOURETHROSCOPY,URETER CATHETER Bilateral 10/21/2021    Procedure: CYSTOSCOPY RETROGRADE PYELOGRAM WITH INSERTION STENT URETERAL;  Surgeon: Ceferino Allen MD;  [facility];  Service: Urology    MS CYSTOURETHROSCOPY,URETER CATHETER Left 2/25/2022 Procedure: CYSTOSCOPY RETROGRADE PYELOGRAM WITH INSERTION STENT URETERAL; DIAGNOSTIC URETEROSCOPY;  Surgeon: Nan Randle MD;  [facility];  Service: Urology       Family History   Problem Relation Age of Onset    Stroke Father      I have reviewed and agree with the history as documented  E-Cigarette/Vaping    E-Cigarette Use Never User      E-Cigarette/Vaping Substances     Social History     Tobacco Use    Smoking status: Never Smoker    Smokeless tobacco: Never Used   Vaping Use    Vaping Use: Never used   Substance Use Topics    Alcohol use: Yes     Comment: socially    Drug use: No       Review of Systems   Constitutional: Negative for chills and fever  HENT: Negative for congestion  Eyes: Negative for visual disturbance  Respiratory: Negative for cough and shortness of breath  Cardiovascular: Negative for chest pain and leg swelling  Gastrointestinal: Positive for abdominal pain  Negative for diarrhea, nausea and vomiting  Genitourinary: Positive for dysuria, frequency and urgency  Negative for flank pain and pelvic pain  Musculoskeletal: Negative for arthralgias, back pain, neck pain and neck stiffness  Skin: Negative for rash  Neurological: Negative for weakness, numbness and headaches  Psychiatric/Behavioral: Negative for agitation, behavioral problems and confusion  Physical Exam  Physical Exam  Constitutional:       General: She is not in acute distress  Appearance: She is well-developed  She is not diaphoretic  HENT:      Head: Normocephalic and atraumatic  Right Ear: External ear normal       Left Ear: External ear normal       Nose: Nose normal    Eyes:      Conjunctiva/sclera: Conjunctivae normal    Cardiovascular:      Rate and Rhythm: Normal rate and regular rhythm  Pulses: Normal pulses  Heart sounds: Normal heart sounds  No murmur heard  No friction rub  No gallop     Pulmonary:      Effort: Pulmonary effort is normal  No respiratory distress  Breath sounds: Normal breath sounds  No wheezing or rales  Abdominal:      General: Bowel sounds are normal  There is no distension  Palpations: Abdomen is soft  Tenderness: There is abdominal tenderness (LLQ to deep palpation)  There is no right CVA tenderness, left CVA tenderness or guarding  Musculoskeletal:         General: No deformity  Normal range of motion  Cervical back: Normal range of motion and neck supple  Right lower leg: No edema  Left lower leg: No edema  Skin:     General: Skin is warm and dry  Neurological:      Mental Status: She is alert and oriented to person, place, and time  Motor: No abnormal muscle tone     Psychiatric:         Mood and Affect: Mood normal          Vital Signs  ED Triage Vitals [08/21/22 1009]   Temperature Pulse Respirations Blood Pressure SpO2   98 8 °F (37 1 °C) 61 18 126/62 97 %      Temp Source Heart Rate Source Patient Position - Orthostatic VS BP Location FiO2 (%)   Oral Monitor Sitting Right arm --      Pain Score       8           Vitals:    08/21/22 1009 08/21/22 1404   BP: 126/62 148/64   Pulse: 61 56   Patient Position - Orthostatic VS: Sitting Sitting         Visual Acuity      ED Medications  Medications   cefTRIAXone (ROCEPHIN) IVPB (premix in dextrose) 1,000 mg 50 mL (0 mg Intravenous Stopped 8/21/22 1228)   morphine injection 2 mg (2 mg Intravenous Given 8/21/22 1401)       Diagnostic Studies  Results Reviewed     Procedure Component Value Units Date/Time    Basic metabolic panel [[id number]] Collected: 08/21/22 1118    Lab Status: Final result Specimen: Blood from Arm, Right Updated: 08/21/22 1150     Sodium 139 mmol/L      Potassium 3 8 mmol/L      Chloride 105 mmol/L      CO2 27 mmol/L      ANION GAP 7 mmol/L      BUN 10 mg/dL      Creatinine 0 76 mg/dL      Glucose 92 mg/dL      Calcium 9 5 mg/dL      eGFR 78 ml/min/1 73sq m     Narrative:      [place] guidelines for Chronic Kidney Disease (CKD):     Stage 1 with normal or high GFR (GFR > 90 mL/min/1 73 square meters)    Stage 2 Mild CKD (GFR = 60-89 mL/min/1 73 square meters)    Stage 3A Moderate CKD (GFR = 45-59 mL/min/1 73 square meters)    Stage 3B Moderate CKD (GFR = 30-44 mL/min/1 73 square meters)    Stage 4 Severe CKD (GFR = 15-29 mL/min/1 73 square meters)    Stage 5 End Stage CKD (GFR <15 mL/min/1 73 square meters)  Note: GFR calculation is accurate only with a steady state creatinine    Urine Microscopic [[id number]]  (Abnormal) Collected: 08/21/22 1035    Lab Status: Final result Specimen: Urine, Clean Catch Updated: 08/21/22 1134     RBC, UA 30-50 /hpf      WBC, UA Innumerable /hpf      Epithelial Cells Occasional /hpf      Bacteria, UA Moderate /hpf      WBC Clumps Present    Urine culture [[id number]] Collected: 08/21/22 1035    Lab Status:  In process Specimen: Urine, Clean Catch Updated: 08/21/22 1134    CBC and differential [[id number]] Collected: 08/21/22 1118    Lab Status: Final result Specimen: Blood from Arm, Right Updated: 08/21/22 1129     WBC 6 54 Thousand/uL      RBC 4 48 Million/uL      Hemoglobin 14 3 g/dL      Hematocrit 43 5 %      MCV 97 fL      MCH 31 9 pg      MCHC 32 9 g/dL      RDW 13 3 %      MPV 9 8 fL      Platelets 246 Thousands/uL      nRBC 0 /100 WBCs      Neutrophils Relative 68 %      Immat GRANS % 1 %      Lymphocytes Relative 16 %      Monocytes Relative 12 %      Eosinophils Relative 2 %      Basophils Relative 1 %      Neutrophils Absolute 4 57 Thousands/µL      Immature Grans Absolute 0 03 Thousand/uL      Lymphocytes Absolute 1 02 Thousands/µL      Monocytes Absolute 0 77 Thousand/µL      Eosinophils Absolute 0 10 Thousand/µL      Basophils Absolute 0 05 Thousands/µL     UA w Reflex to Microscopic w Reflex to Culture [[id number]]  (Abnormal) Collected: 08/21/22 1035    Lab Status: Final result Specimen: Urine, Clean Catch Updated: 08/21/22 1115     Color, UA Yellow     Clarity, UA Extra Turbid     Specific [place], UA 1 010     pH, UA 6 0     Leukocytes, UA Large     Nitrite, UA Negative     Protein,  (2+) mg/dl      Glucose, UA Negative mg/dl      Ketones, UA Negative mg/dl      Urobilinogen, UA <2 0 mg/dl      Bilirubin, UA Negative     Occult Blood, UA Large                 CT renal stone study abdomen pelvis wo contrast   Final Result by Sarath Garcia MD (08/21 1254)      Massively dilated left renal collecting system despite the presence of the nephroureteral stent  This would suggest stent malfunction  Gallstones      Persistent interstitial opacities in the lung bases of uncertain significance  Workstation performed: [id number]                    Procedures  Procedures         ED Course  ED Course as of 08/21/22 1427   Sun Aug 21, 2022   1122 UA with large leukocytes and large blood, consistent with UTI given the patient's symptoms  Patient does have left-sided abdominal discomfort so will obtain CT renal study in order to rule out hydronephrosis or migration of the patient's stent  Given indwelling stent will give dose of ceftriaxone  Anticipate transition to oral cephalexin and discharge home if renal function and CT renal study are unremarkable  5 CT stone study shows massive dilatation of the L renal collecting system suggesting stent malfunction  Will discuss with urologist on call  1328 Patient reporting increasing pain to the L flank  Will give morphine  SBIRT 20yo+    Flowsheet Row Most Recent Value   SBIRT (25 yo +)    In order to provide better care to our patients, we are screening all of our patients for alcohol and drug use  Would it be okay to ask you these screening questions? Yes Filed at: 08/21/2022 1201   Initial Alcohol Screen: US AUDIT-C     1  How often do you have a drink containing alcohol? 0 Filed at: 08/21/2022 1201   2   How many drinks containing alcohol do you have on a typical day you are drinking? 0 Filed at: 08/21/2022 1201   3b  FEMALE Any Age, or MALE 65+: How often do you have 4 or more drinks on one occassion? 0 Filed at: 08/21/2022 1201   Audit-C Score 0 Filed at: 08/21/2022 1201   ROHAN: How many times in the past year have you    Used an illegal drug or used a prescription medication for non-medical reasons? Never Filed at: 08/21/2022 1201                    MDM  Number of Diagnoses or Management Options  Hydronephrosis: new and requires workup  UTI (urinary tract infection): new and requires workup  Diagnosis management comments: Please see ED course above for details of the Medical Decision Making  Amount and/or Complexity of Data Reviewed  Clinical lab tests: ordered and reviewed  Tests in the radiology section of CPT®: ordered and reviewed  Review and summarize past medical records: yes  Discuss the patient with other providers: yes    Patient Progress  Patient progress: stable      Disposition  Final diagnoses:   UTI (urinary tract infection)   Hydronephrosis     Time reflects when diagnosis was documented in both MDM as applicable and the Disposition within this note     Time User Action Codes Description Comment    8/21/2022  2:04 PM Jarrod Howe Add [N13 30] Hydronephrosis of left kidney     8/21/2022  2:19 PM Radha Ventura Add [N39 0] UTI (urinary tract infection)     8/21/2022  2:19 PM Radha Ventura Add [N13 30] Hydronephrosis       ED Disposition     ED Disposition   Admit    Condition   Stable    Date/Time   Sun Aug 21, 2022  2:18 PM    Comment   Case was discussed with SUMAYA and the patient's admission status was agreed to be Admission Status: inpatient status to the service of Dr Julio Cesar Baum  Follow-up Information    None         Patient's Medications   Discharge Prescriptions    No medications on file       No discharge procedures on file      PDMP Review     None          ED Provider  Electronically Signed by Nany Cerda MD  08/21/22 0141

## 2022-08-21 NOTE — CONSULTS
CONSULT    Patient Name: Pepito Mane  Patient MRN: [id number]  Date of Service: 8/21/2022   Date / Time Note Created: 8/21/2022 3:58 PM   Referring Provider: Yamila Joseph MD    Provider Creating Note: TOMÁS Grover    PCP: Melinda Santo  Attending Provider:  Yamila Joseph MD    Reason for Consult: Flank Pain    HPI:  Pepito Mane is a pleasant 15-year-old female known to our service for chronic left UPJ obstruction, declining pyeloplasty and maintain with chronic left ureteral stent  She was scheduled for routine ureteral stent exchange with Dr Frederic Ding 8/26  She presents to [facility] with persistent left flank pain; not accompanied by fever, chills, hematuria or dysuria  Patient was evaluated  Vital signs are stable  She is without acute kidney injury or leukocytosis  CT of the abdomen and pelvis demonstrates severe left hydronephrosis despite favorably position left ureteral stents suggesting stent failure  Patient was admitted to the Internal Medicine service for symptom control  Urologic consultation requested for further management recommendations  Active Problems:    Patient Active Problem List   Diagnosis    Recurrent UTI     Bilateral Hydronephrosis with left UPJ obstruction    Hypertension    Abnormal urinalysis    Leukocytosis    Bowel obstruction (HCC)            Impressions  · Left chronic UPJ obstruction--chronic left ureteral stent status  · Left Hydronephrosis--secondary to left ureteral stent failure  · Renal Colic    Recommendations  1  Initiate aggressive IVFs   2  Flomax  3  Analgesia/Narcotics   4  Anti-emetics   5  ATBs empirically while awaiting culture   6  NPO after Midnight for OR  Cystoscopy, retrograde pyelography and left ureteral stent exchange          Past Medical History:   Diagnosis Date    Chronic kidney disease     GERD (gastroesophageal reflux disease)     Hypertension     Lyme disease        Past Surgical History:   Procedure Laterality Date    FL RETROGRADE PYELOGRAM  10/21/2021    FL RETROGRADE PYELOGRAM  2022    HYSTERECTOMY      WA CYSTOURETHROSCOPY,URETER CATHETER Bilateral 10/21/2021    Procedure: CYSTOSCOPY RETROGRADE PYELOGRAM WITH INSERTION STENT URETERAL;  Surgeon: Ceferino Allen MD;  Location: [facility];  Service: Urology    WA CYSTOURETHROSCOPY,URETER CATHETER Left 2022    Procedure: CYSTOSCOPY RETROGRADE PYELOGRAM WITH INSERTION STENT URETERAL; DIAGNOSTIC URETEROSCOPY;  Surgeon: Cefernio Allen MD;  Location: [facility];  Service: Urology       Family History   Problem Relation Age of Onset    Stroke Father        Social History     Socioeconomic History    Marital status: /Civil Union     Spouse name: Not on file    Number of children: Not on file    Years of education: Not on file    Highest education level: Not on file   Occupational History    Not on file   Tobacco Use    Smoking status: Never Smoker    Smokeless tobacco: Never Used   Vaping Use    Vaping Use: Never used   Substance and Sexual Activity    Alcohol use: Yes     Comment: socially    Drug use: No    Sexual activity: Not on file   Other Topics Concern    Not on file   Social History Narrative    Not on file     Social Determinants of Health     Financial Resource Strain: Not on file   Food Insecurity: Not on file   Transportation Needs: Not on file   Physical Activity: Not on file   Stress: Not on file   Social Connections: Not on file   Intimate Partner Violence: Not on file   Housing Stability: Not on file       Allergies   Allergen Reactions    Bactrim [Sulfamethoxazole-Trimethoprim] Other (See Comments)     Her mom  from [address], Family histor?  Cefixime Other (See Comments)     30 years ago, unclear reaction   Believes allergy listed due to C Dif  Tolerated Ceftriaxone 10/21    Sulfa Antibiotics Other (See Comments)     Causes sores on skin - per pt       Review of Systems  10 point review of systems negative except as noted in HPI  Constitutional:   negative for - chills or fever  Cardiovascular:   no chest pain or dyspnea on exertion  Gastrointestinal:   positive for - abdominal pain, appetite loss and nausea/vomiting  Genito-Urinary:   no dysuria, trouble voiding, or hematuria  Neurological:   no TIA or stroke symptoms     Chart Review   Allergies, current medications, history, problem list    Vital Signs  /64 (BP Location: Left arm)   Pulse 56   Temp 98 8 °F (37 1 °C) (Oral)   Resp 17   Ht 5' 4" (1 626 m)   Wt 63 5 kg (140 lb)   SpO2 97%   BMI 24 03 kg/m²     Physical Exam  General appearance: alert and oriented, in no acute distress, appears stated age, cooperative and no distress  Head: Normocephalic, without obvious abnormality, atraumatic  Neck: no adenopathy, no carotid bruit, no JVD, supple, symmetrical, trachea midline and thyroid not enlarged, symmetric, no tenderness/mass/nodules  Lungs: diminished breath sounds  Heart: regular rate and rhythm, S1, S2 normal, no murmur, click, rub or gallop  Abdomen: soft, non-tender; bowel sounds normal; no masses,  no organomegaly  Extremities: extremities normal, warm and well-perfused; no cyanosis, clubbing, or edema  Pulses: 2+ and symmetric  Neurologic: Grossly normal  No external urinary drains  Internalized left ureteral stent     Laboratory Studies  Lab Results   Component Value Date    K 3 8 08/21/2022     08/21/2022    CO2 27 08/21/2022    CREATININE 0 76 08/21/2022    BUN 10 08/21/2022    MG 2 0 06/04/2022    PHOS 3 0 06/04/2022     Lab Results   Component Value Date    WBC 6 54 08/21/2022    RBC 4 48 08/21/2022    HGB 14 3 08/21/2022    HCT 43 5 08/21/2022    MCV 97 08/21/2022    MCH 31 9 08/21/2022    RDW 13 3 08/21/2022     08/21/2022         Imaging and Other Studies  )  CT renal protocol    Result Date: 8/11/2022  Narrative: CT ABDOMEN AND PELVIS WITH AND WITHOUT IV CONTRAST INDICATION:   N13 5: CROSSING VESSEL AND STRICTURE OF URETER WITHOUT HYDRONEPHROSIS  ADDITIONAL HISTORY FROM EPIC: LEFT HYDRONEPHROSIS, FOLLOW-UP  UPJ OBSTRUCTION  COMPARISON:  CT abdomen and pelvis 6/2/2022 TECHNIQUE: Initial CT of the abdomen and pelvis was performed without intravenous contrast   Subsequent dynamic CT evaluation of the abdomen and pelvis was performed after the administration of intravenous contrast in both nephrographic and delayed phases after the administration of intravenous contrast    Axial, sagittal, and coronal 2D reformatted images were created from the source data and submitted for interpretation  Radiation dose length product (DLP) for this visit:  963 mGy-cm   This examination, like all CT scans performed in the [facility], was performed utilizing techniques to minimize radiation dose exposure, including the use of iterative reconstruction and automated exposure control  IV Contrast:  70 mL of iohexol (OMNIPAQUE) Enteric Contrast:  Enteric contrast was not administered  FINDINGS: ABDOMEN RIGHT KIDNEY AND URETER: No solid renal mass  No detectable urothelial mass  A 0 7 cm right lower pole renal cyst is noted  No hydronephrosis or hydroureter  No urinary tract calculi  No perinephric collection  There are few phleboliths along the course of the right ureter slightly limiting evaluation  LEFT KIDNEY AND URETER: No solid renal mass  No detectable urothelial mass  Moderate left pelvocaliectasis, increased compared to prior study  Stable left nephroureteral stent   No evidence of nephrolithiasis  No suspicious ureterolithiasis present along the course of the stent  No perinephric collection  URINARY BLADDER: No bladder wall mass  No calculi  LOWER CHEST:  Mild dependent atelectatic changes  LIVER/BILIARY TREE:  Too small to characterize 5 x 8 mm hypodense lesion within the inferior right hepatic lobe (series 4, image 77)  GALLBLADDER:  Tiny layering calcified gallstones   No pericholecystic inflammatory change  SPLEEN:  Unremarkable  PANCREAS:  Unremarkable  ADRENAL GLANDS:  Stable 0 9 cm right adrenal nodule with Hounsfield units favoring an adrenal adenoma  The left adrenal gland appears within normal limits  STOMACH AND BOWEL:  Unremarkable  APPENDIX:  No findings to suggest appendicitis  ABDOMINOPELVIC CAVITY:  No ascites  No free intraperitoneal air  No lymphadenopathy  VESSELS:  Unremarkable for patient's age  PELVIS REPRODUCTIVE ORGANS: Status post hysterectomy with no suspicious adnexal lesion  ABDOMINAL WALL/INGUINAL REGIONS:  Unremarkable  OSSEOUS STRUCTURES:  No acute fracture or destructive osseous lesion  Mild multilevel spondylotic changes  Impression: 1  Stable left nephroureteral stent  Moderate left pelvocaliectasis, increased compared to 6/2/2022  2  No evidence of nephrolithiasis or ureterolithiasis  Subcentimeter right lower pole renal cyst  3  Stable subcentimeter right adrenal nodule  4   Status post hysterectomy  The study was marked in [facility] for immediate notification  Workstation performed: [id number]     CT renal stone study abdomen pelvis wo contrast    Result Date: 8/21/2022  Narrative: CT ABDOMEN AND PELVIS WITHOUT IV CONTRAST - LOW DOSE RENAL STONE INDICATION:   L sided flank pain, stent in place  COMPARISON:  August 5, 2022 TECHNIQUE:  Low radiation dose thin section CT examination of the abdomen and pelvis was performed without intravenous or oral contrast according to a protocol specifically designed to evaluate for urinary tract calculus  Axial, sagittal, and coronal 2D  reformatted images were created from the source data and submitted for interpretation  Evaluation for pathology in the abdomen and pelvis that is unrelated to urinary tract calculi is limited  Radiation dose length product (DLP) for this visit:  124 mGy-cm     This examination, like all CT scans performed in the [facility], was performed utilizing techniques to minimize radiation dose exposure, including the use of iterative reconstruction and automated exposure control  URINARY TRACT FINDINGS: RIGHT KIDNEY AND URETER:  No urinary tract calculi  No hydronephrosis or hydroureter  LEFT KIDNEY AND URETER:  The left renal pelvis is now massively dilated, much more so than on the prior scan  This would suggest that the stent is not functioning properly  It seems stable in position from the prior exam with the proximal pigtail formed within the dilated renal pelvis and the distal pigtail formed in the bladder  There are no kidney stones  There seems to be some thinning of the left renal cortex  No ureteral calculi are seen  URINARY BLADDER:  No acute findings ADDITIONAL FINDINGS: LOWER CHEST:  Bibasilar interstitial opacities similar to the prior study  There is elevation of the left hemidiaphragm  SOLID VISCERA: Limited low radiation dose noncontrast CT evaluation demonstrates no clinically significant abnormality of the imaged portions of the liver, spleen, or pancreas  There is a stable small round right adrenal nodule which measures 4 Hounsfield units internally in keeping with benign adenoma  [employer] GALLBLADDER/BILIARY TREE:  There are gallstone(s) within the gallbladder, without pericholecystic inflammatory changes  No biliary dilatation  STOMACH AND BOWEL:  Unremarkable  APPENDIX:  No findings to suggest appendicitis  ABDOMINOPELVIC CAVITY:  No ascites  No pneumoperitoneum  No lymphadenopathy  REPRODUCTIVE ORGANS:  Surgical changes of prior hysterectomy  ABDOMINAL WALL/INGUINAL REGIONS:  Small midline upper abdominal wall hernia containing fat and possibly a small amount of fluid, similar to the prior study  OSSEOUS STRUCTURES:  Degenerative changes of the spine as well as levoscoliosis  Impression: Massively dilated left renal collecting system despite the presence of the nephroureteral stent  This would suggest stent malfunction   Gallstones Persistent interstitial opacities in the lung bases of uncertain significance  Workstation performed: [id number]       Medications   Scheduled Meds:  Continuous Infusions:No current facility-administered medications for this encounter      PRN Meds:               TOMÁS Senior

## 2022-08-21 NOTE — QUICK NOTE
[facility] Attending Physician Attestation Note - Admission    I have seen and examined Bertin Lance personally and have reviewed the medical record independently  I have reviewed the case with the resident physician including all assessments and the plan of care for each  I agree with the resident physician and offer the following addendum to the below statements by the resident physician:     Date Evaluated: 8/21/22  Time Evaluated: 1800pm    Subjective / HPI:  Briefly, this is a 75-year-old female with known chronic left UPJ obstruction status post stenting about a year ago  Follows with Urology, they had noted increasing dilatation of the collecting system on serial imaging suggesting that her stent was failing  She was planned for stent exchange as an outpatient on 8/26/22  Unfortunately, the patient developed worsening left flank pain and infectious symptoms suggestive of UTI which prompted her to come to the emergency department for further evaluation  Labs and imaging have personally been reviewed:  Afebrile without leukocytosis  Renal function at baseline  CT renal stone study abdomen pelvis without contrast shows massively dilated left renal collecting system with nephroureteral stent in place  Incidentally noted some gallstones as well as persistent interstitial opacities of the lung bases of uncertain significance      Exam:   /67   Pulse 57   Temp 98 5 °F (36 9 °C)   Resp 18   Ht 5' 4" (1 626 m)   Wt 63 5 kg (140 lb)   SpO2 95%   BMI 24 03 kg/m²   General appearance: alert and oriented, in no acute distress  Lungs: clear to auscultation bilaterally  Heart: regular rate and rhythm and systolic murmur:  Abdomen: soft, non-tender; bowel sounds normal; no masses,  no organomegaly  Extremities: extremities normal, warm and well-perfused; no cyanosis, clubbing, or edema    Assessment and Plan:  # left UPJ obstruction status post stenting  with hydroureter in the setting of stent failure  # acute cystitis, recurrent UTI  # hypertension  # history of small-bowel obstruction  # stage II CKD  # moderate aortic insufficiency, asymptomatic  # bibasilar opacities noted on CT    · Continue empiric antibiotics, follow-up urine cultures and adjust antibiotics to speciation sensitivities  I do not see any prior urine cultures in her medical record suggestive of multi resistant organism infection in the past  · Appreciate urology consultation  Plan for NPO after midnight with likely cystoscopy tomorrow verses over the next few days, as she was planned for stent exchange done outpatient on 08/26  Will need to clarify further with Urology timing in the morning  · Fortunately, her renal function is stable, monitor renal function closely  Avoid nephrotoxic agents  Monitor a m  BMP  · Resume home amlodipine and metoprolol  · Patient at high risk for development of ileus/small-bowel obstruction given her history  Would encourage ambulation  Start bowel regimen  Monitor abdominal exam closely and stool output  · With respect to the incidental bibasilar opacities noted on her CT, recommend that she follow-up with her primary care doctor if this has not been further evaluated before in the past   She should probably have a dedicated chest CT without contrast as an outpatient  Agree with rest of plans as outlined by the resident physician  Education and Discussions with Family / Patient:  Discussed with patient  Resident will provide medical update for patient's significant other and daughter  Time Spent for Care: 60 minutes  More than 50% of total time spent on counseling and coordination of care as described above  Current Patient Status: Inpatient   Anticipated Length of Stay:  Patient will be admitted on an Inpatient basis with an anticipated length of stay of  > 2 midnights     Justification for Hospital Stay:  Left UPJ acute on chronic obstruction with massive hydroureter requiring urologic intervention  Antibiotics for UTI  Epic / Care Everywhere Records Reviewed Independently: Yes     For detailed history, assessment, and plan of care, please review the statements below by Dr Clifton Cortez MD

## 2022-08-21 NOTE — ASSESSMENT & PLAN NOTE
Patient had L UPJ stent placed 10/21/2021 for long term hydronephrosis   Testing after placement demonstrated persistent obstruction that would require continued stent management q3-12 months  First stent revision 2/25/22  Next revision planned for 8/26/22      CT renal stone study abdomen pelvis wo contrast   Final Result by Daniele Hall MD (08/21 7964)      Massively dilated left renal collecting system despite the presence of the nephroureteral stent  This would suggest stent malfunction  Gallstones      Persistent interstitial opacities in the lung bases of uncertain significance  Workstation performed: [id number]             Assessment:  - Patient with chronic hydronephrosis and L UPJ stent presenting with concern for UTI five days before scheduled stent revision procedure       Plan:   - tentative plan to perform stent revision 8/22/22  - NPO after midnight on 8/22/22  - monitor renal function with daily BMP   - urology following   - follow-up with urology outpatient after discharge

## 2022-08-21 NOTE — ASSESSMENT & PLAN NOTE
Patient has established history of hypertension at baseline  On amlodipine and metoprolol PTA  Most recent blood pressure reading: Blood Pressure: 143/67      Assessment:   - Patient well controlled on home regimen, with no concern for nephrotoxic anti-HTN medications       Plan:  - Continue PTA amlodipine and metoprolol

## 2022-08-21 NOTE — H&P
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  Assessment & Plan  Patient had L UPJ stent placed 10/21/2021 for long term hydronephrosis   Testing after placement demonstrated persistent obstruction that would require continued stent management q3-12 months  First stent revision 2/25/22  Next revision planned for 8/26/22      CT renal stone study abdomen pelvis wo contrast   Final Result by Giancarlo Fuller MD (08/21 1254)      Massively dilated left renal collecting system despite the presence of the nephroureteral stent  This would suggest stent malfunction  Gallstones      Persistent interstitial opacities in the lung bases of uncertain significance  Workstation performed: [id number]             Assessment:  - Patient with chronic hydronephrosis and L UPJ stent presenting with concern for UTI five days before scheduled stent revision procedure  Plan:   - tentative plan to perform stent revision 8/22/22  - NPO after midnight on 8/22/22  - monitor renal function with daily BMP   - urology following   - follow-up with urology outpatient after discharge         Recurrent UTI  Assessment & Plan  Patient presents with foggy urine, lower abdominal pressure, dysuria, increased urinary frequency, chills, foul smelling urine x 1 day and a history of chronic UTIs  Patient is s/p L UPJ placement in 10/21/21 and s/p stent revision 2/25/22 with next revision scheduled for 8/26/22  Physical exam reveals lower abdominal tenderness to palpation but no CVA tenderness  Afebrile on admission with no leukocytosis       UA on admission:     Results from last 7 days   Lab Units 08/21/22  1035   LEUKOCYTES UA  Large*   NITRITE UA  Negative   GLUCOSE UA mg/dl Negative KETONES UA mg/dl Negative   BLOOD UA  Large*   WBC UA /hpf Innumerable*   RBC UA /hpf 30-50*   BACTERIA UA /hpf Moderate*       Assessment:   - Complicated UTI in postmenopausal female w/ L UPJ stent but currently not showing signs or symptoms of urosepsis or pyelonephritis  Plan:   - monitor vital signs for evidence of sepsis   - monitor daily CBC for evidence of infection   - urine cultures pending   - patient started on rocephin in ED, continued on admission q24h (D1)  - serial abdominal exams - follow for evidence of pyelonephritis   - consider starting carbepenem if cultures positive for ESBL   - no previous UTI culture data found on chart review    Stage 2 chronic kidney disease  Assessment & Plan  Lab Results   Component Value Date    EGFR 78 08/21/2022    EGFR 84 06/04/2022    EGFR 86 06/03/2022    EGFR 70 06/02/2022    EGFR 90 05/20/2022    CREATININE 0 76 08/21/2022    CREATININE 0 71 06/04/2022    CREATININE 0 69 06/03/2022    CREATININE 0 83 06/02/2022    CREATININE 0 61 05/20/2022    BUN 10 08/21/2022    BUN 9 06/04/2022    BUN 15 06/03/2022    BUN 15 06/02/2022    BUN 8 05/20/2022     Patient has history of chronic UTI, chronic hydronephrosis   Renal currently function at patient's baseline       Assessment:   - Patient w/ stable stage II CKD at baseline presents w/ concern for UTI and hydroureter s/p UPJ stent, but without concern for CHANDNI at this time  Plan:   - monitor kidney function with daily BMP  - avoid nephrotoxic medications   - consider nephrology consult, if needed         H/O small bowel obstruction  Assessment & Plan  Monitor serial abdominal exams, stool output   Bowel regimen  Post-op     Essential hypertension  Assessment & Plan  Patient has established history of hypertension at baseline  On amlodipine and metoprolol PTA       Most recent blood pressure reading: Blood Pressure: 143/67      Assessment:   - Patient well controlled on home regimen, with no concern for nephrotoxic anti-HTN medications  Plan:  - Continue PTA amlodipine and metoprolol     VTE Pharmacologic Prophylaxis: VTE Score: 3 Moderate Risk (Score 3-4) - Pharmacological DVT Prophylaxis Ordered: heparin  Code Status: Level 1 - Full Code   Discussion with family: Updated  () via phone  Anticipated Length of Stay: Patient will be admitted on an inpatient basis with an anticipated length of stay of greater than 2 midnights secondary to anticipated urological surgery  Total Time for Visit, including Counseling / Coordination of Care: 60 minutes Greater than 50% of this total time spent on direct patient counseling and coordination of care  Chief Complaint: UTI    History of Present Illness:  Jackie Moreno is a 66 y o  female with a PMH of frequent UTI and L UPJ stent who presents with new onset UTI and hydronephrosis  Patient reports she started having symptoms of UTI yesterday evening with foggy urine, pelvic/lower abdominal pain and pressure, frequent urination, chills, foul smelling urine, and bilateral back pain  She denies burning with urination, fever, SOB, chest pain, N or V  She reports having a good appetite and her last bowel movement was this morning  She denied any hx of kidney stones  Patient first began following with Dr Keely Espino in Urology in October 2021 when she had 2-3 UTI's in one year and CT scan showed chronic L renal atrophy and long term hydronephrosis  L uretral stent was originally placed 10/21/2021  Retrograde pyelogram showed massively distended L renal pelvis w/ calyceal blunting  A 6 Fr X 26 cm stent was placed  Imaging after stent placement demonstrated persistent high grade obstruction of the L side with a T1/2 of 38 despite the stent  Thus, patient requires continued management of the stent involving exchanges every 3-12 months depending on clinical course   Patient was not interested in L nephrectomy which would be the definitive management  Patient underwent successful L ureteral stent exchange on 2/25/22 w/ placement of a 6Fr x 22cm  Dr Earl Cao noted the next replacement would be in 6 months, and that he would plan to convert to metallic stent  Patient was scheduled for this next replacement to take place on Friday, 8/26/22  In preparation, she underwent CT on 8/5/22 which showed stable L UPJ stent, moderate L pelvocaliectasis increased from 6/2/22, and no evidence of nephrolithiasis or ureterolithiasis  Patient also has a history of endometrial cancer s/p hysterectomy and history of small bowel obstruction  Review of Systems:  Review of Systems   Constitutional: Positive for chills  Negative for fatigue and fever  Respiratory: Negative for cough and shortness of breath  Cardiovascular: Negative for chest pain and palpitations  Gastrointestinal: Positive for abdominal pain  Negative for blood in stool, constipation, diarrhea, nausea and vomiting  Genitourinary: Positive for dysuria, frequency and hematuria  Musculoskeletal: Positive for back pain  Neurological: Negative for light-headedness and headaches  Psychiatric/Behavioral: Negative for confusion and decreased concentration  The patient is not nervous/anxious          Past Medical and Surgical History:   Past Medical History:   Diagnosis Date    Chronic kidney disease     GERD (gastroesophageal reflux disease)     Hypertension     Lyme disease        Past Surgical History:   Procedure Laterality Date    FL RETROGRADE PYELOGRAM  10/21/2021    FL RETROGRADE PYELOGRAM  2/25/2022    HYSTERECTOMY      VT CYSTOURETHROSCOPY,URETER CATHETER Bilateral 10/21/2021    Procedure: CYSTOSCOPY RETROGRADE PYELOGRAM WITH INSERTION STENT URETERAL;  Surgeon: Jorden Benson MD;  Location: [facility];  Service: Urology    VT CYSTOURETHROSCOPY,URETER CATHETER Left 2/25/2022    Procedure: CYSTOSCOPY RETROGRADE PYELOGRAM WITH INSERTION STENT URETERAL; DIAGNOSTIC URETEROSCOPY;  Surgeon: Raymond Bailey MD;  Location[facility];  Service: Urology       Meds/Allergies:  Prior to Admission medications    Medication Sig Start Date End Date Taking? Authorizing Provider   amLODIPine (NORVASC) 5 mg tablet Take 5 mg by mouth 2 (two) times a day 21 Yes Historical Provider, MD   Ibuprofen (ADVIL PO) Take by mouth   Yes Historical Provider, MD   metoprolol succinate (TOPROL-XL) 25 mg 24 hr tablet Take 25 mg by mouth 2 (two) times a day  18  Yes Historical Provider, MD   acetaminophen (TYLENOL) 325 mg tablet Take 2 tablets (650 mg total) by mouth every 4 (four) hours as needed for mild pain  Patient not taking: No sig reported 22  Raymond Bailey MD     I have reviewed home medications with patient personally  Allergies: Allergies   Allergen Reactions    Bactrim [Sulfamethoxazole-Trimethoprim] Other (See Comments)     Her mom  from [address], Family histor?  Cefixime Other (See Comments)     30 years ago, unclear reaction   Believes allergy listed due to C Dif  Tolerated Ceftriaxone 10/21    Sulfa Antibiotics Other (See Comments)     Causes sores on skin - per pt       Social History:  Marital Status: /Civil Union   Occupation: Retired  Patient Pre-hospital Living Situation: With spouse  Patient Pre-hospital Level of Mobility: walks  Patient Pre-hospital Diet Restrictions: None  Substance Use History:   Social History     Substance and Sexual Activity   Alcohol Use Yes    Comment: socially     Social History     Tobacco Use   Smoking Status Never Smoker   Smokeless Tobacco Never Used     Social History     Substance and Sexual Activity   Drug Use No       Family History:  Family History   Problem Relation Age of Onset    Stroke Father        Physical Exam:     Vitals:   Blood Pressure: 143/67 (22 1647)  Pulse: 57 (22 1647)  Temperature: 98 5 °F (36 9 °C) (22 1647)  Temp Source: Oral (22 1009)  Respirations: 18 (08/21/22 1647)  Height: 5' 4" (162 6 cm) (08/21/22 1009)  Weight - Scale: 63 5 kg (140 lb) (08/21/22 1009)  SpO2: 95 % (08/21/22 1647)    Physical Exam  Vitals and nursing note reviewed  Constitutional:       General: She is not in acute distress  Appearance: She is well-developed  She is not toxic-appearing or diaphoretic  HENT:      Head: Normocephalic and atraumatic  Eyes:      General: No scleral icterus  Right eye: No discharge  Left eye: No discharge  Conjunctiva/sclera: Conjunctivae normal    Cardiovascular:      Rate and Rhythm: Normal rate and regular rhythm  Heart sounds: Normal heart sounds  No murmur heard  No friction rub  No gallop  Pulmonary:      Effort: Pulmonary effort is normal  No respiratory distress  Breath sounds: Normal breath sounds  No wheezing, rhonchi or rales  Chest:      Chest wall: No tenderness  Abdominal:      General: Abdomen is flat  Bowel sounds are normal  There is no distension  Palpations: Abdomen is soft  Tenderness: There is abdominal tenderness  There is no right CVA tenderness, left CVA tenderness, guarding or rebound  Musculoskeletal:         General: No swelling or tenderness  Cervical back: Neck supple  Right lower leg: No edema  Left lower leg: No edema  Skin:     General: Skin is warm and dry  Capillary Refill: Capillary refill takes less than 2 seconds  Coloration: Skin is not jaundiced  Findings: No erythema  Neurological:      Mental Status: She is alert  Psychiatric:         Mood and Affect: Mood normal          Behavior: Behavior normal          Thought Content:  Thought content normal          Judgment: Judgment normal         Additional Data:     Lab Results:  Results from last 7 days   Lab Units 08/21/22  1118   WBC Thousand/uL 6 54   HEMOGLOBIN g/dL 14 3   HEMATOCRIT % 43 5   PLATELETS Thousands/uL 223   NEUTROS PCT % 68   LYMPHS PCT % 16 MONOS PCT % 12   EOS PCT % 2     Results from last 7 days   Lab Units 08/21/22  1118   SODIUM mmol/L 139   POTASSIUM mmol/L 3 8   CHLORIDE mmol/L 105   CO2 mmol/L 27   BUN mg/dL 10   CREATININE mg/dL 0 76   ANION GAP mmol/L 7   CALCIUM mg/dL 9 5   GLUCOSE RANDOM mg/dL 92                       Imaging: Reviewed radiology reports from this admission including: abdominal/pelvic CT     CT renal stone study abdomen pelvis wo contrast   Final Result by Tereso Marinelli MD (08/21 1254)      Massively dilated left renal collecting system despite the presence of the nephroureteral stent  This would suggest stent malfunction  Gallstones      Persistent interstitial opacities in the lung bases of uncertain significance  Workstation performed: [id number]             EKG and Other Studies Reviewed on Admission:   · EKG: No EKG obtained  ** Please Note: This note has been constructed using a voice recognition system   **    Lake Katz MD  PGY-1

## 2022-08-22 ENCOUNTER — APPOINTMENT (OUTPATIENT)
Dept: RADIOLOGY | Facility: HOSPITAL | Age: 72
DRG: 660 | End: 2022-08-22
Payer: MEDICARE

## 2022-08-22 ENCOUNTER — ANESTHESIA (INPATIENT)
Dept: PERIOP | Facility: HOSPITAL | Age: 72
DRG: 660 | End: 2022-08-22
Payer: MEDICARE

## 2022-08-22 ENCOUNTER — ANESTHESIA EVENT (INPATIENT)
Dept: PERIOP | Facility: HOSPITAL | Age: 72
DRG: 660 | End: 2022-08-22
Payer: MEDICARE

## 2022-08-22 LAB
ANION GAP SERPL CALCULATED.3IONS-SCNC: 6 MMOL/L (ref 4–13)
BASOPHILS # BLD AUTO: 0.03 THOUSANDS/ΜL (ref 0–0.1)
BASOPHILS NFR BLD AUTO: 1 % (ref 0–1)
BUN SERPL-MCNC: 10 MG/DL (ref 5–25)
CALCIUM SERPL-MCNC: 8.5 MG/DL (ref 8.4–10.2)
CHLORIDE SERPL-SCNC: 107 MMOL/L (ref 96–108)
CO2 SERPL-SCNC: 27 MMOL/L (ref 21–32)
CREAT SERPL-MCNC: 0.64 MG/DL (ref 0.6–1.3)
EOSINOPHIL # BLD AUTO: 0.08 THOUSAND/ΜL (ref 0–0.61)
EOSINOPHIL NFR BLD AUTO: 2 % (ref 0–6)
ERYTHROCYTE [DISTWIDTH] IN BLOOD BY AUTOMATED COUNT: 13.2 % (ref 11.6–15.1)
GFR SERPL CREATININE-BSD FRML MDRD: 88 ML/MIN/1.73SQ M
GLUCOSE SERPL-MCNC: 100 MG/DL (ref 65–140)
HCT VFR BLD AUTO: 35.5 % (ref 34.8–46.1)
HGB BLD-MCNC: 11.8 G/DL (ref 11.5–15.4)
IMM GRANULOCYTES # BLD AUTO: 0.01 THOUSAND/UL (ref 0–0.2)
IMM GRANULOCYTES NFR BLD AUTO: 0 % (ref 0–2)
LYMPHOCYTES # BLD AUTO: 0.97 THOUSANDS/ΜL (ref 0.6–4.47)
LYMPHOCYTES NFR BLD AUTO: 23 % (ref 14–44)
MCH RBC QN AUTO: 32 PG (ref 26.8–34.3)
MCHC RBC AUTO-ENTMCNC: 33.2 G/DL (ref 31.4–37.4)
MCV RBC AUTO: 96 FL (ref 82–98)
MONOCYTES # BLD AUTO: 0.62 THOUSAND/ΜL (ref 0.17–1.22)
MONOCYTES NFR BLD AUTO: 15 % (ref 4–12)
NEUTROPHILS # BLD AUTO: 2.49 THOUSANDS/ΜL (ref 1.85–7.62)
NEUTS SEG NFR BLD AUTO: 59 % (ref 43–75)
NRBC BLD AUTO-RTO: 0 /100 WBCS
PLATELET # BLD AUTO: 187 THOUSANDS/UL (ref 149–390)
PMV BLD AUTO: 10.1 FL (ref 8.9–12.7)
POTASSIUM SERPL-SCNC: 3.8 MMOL/L (ref 3.5–5.3)
RBC # BLD AUTO: 3.69 MILLION/UL (ref 3.81–5.12)
SODIUM SERPL-SCNC: 140 MMOL/L (ref 135–147)
WBC # BLD AUTO: 4.2 THOUSAND/UL (ref 4.31–10.16)

## 2022-08-22 PROCEDURE — C2617 STENT, NON-COR, TEM W/O DEL: HCPCS | Performed by: UROLOGY

## 2022-08-22 PROCEDURE — 74420 UROGRAPHY RTRGR +-KUB: CPT

## 2022-08-22 PROCEDURE — C1769 GUIDE WIRE: HCPCS | Performed by: UROLOGY

## 2022-08-22 PROCEDURE — 0TP98DZ REMOVAL OF INTRALUMINAL DEVICE FROM URETER, VIA NATURAL OR ARTIFICIAL OPENING ENDOSCOPIC: ICD-10-PCS | Performed by: UROLOGY

## 2022-08-22 PROCEDURE — 99232 SBSQ HOSP IP/OBS MODERATE 35: CPT | Performed by: UROLOGY

## 2022-08-22 PROCEDURE — 99024 POSTOP FOLLOW-UP VISIT: CPT | Performed by: UROLOGY

## 2022-08-22 PROCEDURE — 80048 BASIC METABOLIC PNL TOTAL CA: CPT

## 2022-08-22 PROCEDURE — 87086 URINE CULTURE/COLONY COUNT: CPT | Performed by: UROLOGY

## 2022-08-22 PROCEDURE — BT1F1ZZ FLUOROSCOPY OF LEFT KIDNEY, URETER AND BLADDER USING LOW OSMOLAR CONTRAST: ICD-10-PCS | Performed by: UROLOGY

## 2022-08-22 PROCEDURE — 52332 CYSTOSCOPY AND TREATMENT: CPT | Performed by: UROLOGY

## 2022-08-22 PROCEDURE — 0T778DZ DILATION OF LEFT URETER WITH INTRALUMINAL DEVICE, VIA NATURAL OR ARTIFICIAL OPENING ENDOSCOPIC: ICD-10-PCS | Performed by: UROLOGY

## 2022-08-22 PROCEDURE — 99232 SBSQ HOSP IP/OBS MODERATE 35: CPT | Performed by: FAMILY MEDICINE

## 2022-08-22 PROCEDURE — 85025 COMPLETE CBC W/AUTO DIFF WBC: CPT

## 2022-08-22 DEVICE — STENT URETERAL 6FR 22CM INLAY OPTIMA W/NITINOL GDWR: Type: IMPLANTABLE DEVICE | Site: URETER | Status: FUNCTIONAL

## 2022-08-22 RX ORDER — SODIUM CHLORIDE, SODIUM LACTATE, POTASSIUM CHLORIDE, CALCIUM CHLORIDE 600; 310; 30; 20 MG/100ML; MG/100ML; MG/100ML; MG/100ML
INJECTION, SOLUTION INTRAVENOUS CONTINUOUS PRN
Status: DISCONTINUED | OUTPATIENT
Start: 2022-08-22 | End: 2022-08-22

## 2022-08-22 RX ORDER — PROPOFOL 10 MG/ML
INJECTION, EMULSION INTRAVENOUS AS NEEDED
Status: DISCONTINUED | OUTPATIENT
Start: 2022-08-22 | End: 2022-08-22

## 2022-08-22 RX ORDER — ONDANSETRON 2 MG/ML
4 INJECTION INTRAMUSCULAR; INTRAVENOUS EVERY 6 HOURS PRN
Status: DISCONTINUED | OUTPATIENT
Start: 2022-08-22 | End: 2022-08-23 | Stop reason: HOSPADM

## 2022-08-22 RX ORDER — ONDANSETRON 4 MG/1
4 TABLET, ORALLY DISINTEGRATING ORAL EVERY 6 HOURS PRN
Status: CANCELLED | OUTPATIENT
Start: 2022-08-22

## 2022-08-22 RX ORDER — SODIUM CHLORIDE, SODIUM GLUCONATE, SODIUM ACETATE, POTASSIUM CHLORIDE, MAGNESIUM CHLORIDE, SODIUM PHOSPHATE, DIBASIC, AND POTASSIUM PHOSPHATE .53; .5; .37; .037; .03; .012; .00082 G/100ML; G/100ML; G/100ML; G/100ML; G/100ML; G/100ML; G/100ML
100 INJECTION, SOLUTION INTRAVENOUS CONTINUOUS
Status: DISCONTINUED | OUTPATIENT
Start: 2022-08-22 | End: 2022-08-23 | Stop reason: HOSPADM

## 2022-08-22 RX ORDER — DIPHENHYDRAMINE HYDROCHLORIDE 50 MG/ML
12.5 INJECTION INTRAMUSCULAR; INTRAVENOUS ONCE AS NEEDED
Status: DISCONTINUED | OUTPATIENT
Start: 2022-08-22 | End: 2022-08-22 | Stop reason: HOSPADM

## 2022-08-22 RX ORDER — DEXAMETHASONE SODIUM PHOSPHATE 10 MG/ML
INJECTION, SOLUTION INTRAMUSCULAR; INTRAVENOUS AS NEEDED
Status: DISCONTINUED | OUTPATIENT
Start: 2022-08-22 | End: 2022-08-22

## 2022-08-22 RX ORDER — SODIUM CHLORIDE 9 MG/ML
100 INJECTION, SOLUTION INTRAVENOUS CONTINUOUS
Status: DISCONTINUED | OUTPATIENT
Start: 2022-08-22 | End: 2022-08-22

## 2022-08-22 RX ORDER — TAMSULOSIN HYDROCHLORIDE 0.4 MG/1
0.4 CAPSULE ORAL
Status: CANCELLED | OUTPATIENT
Start: 2022-08-22

## 2022-08-22 RX ORDER — LIDOCAINE HYDROCHLORIDE 10 MG/ML
INJECTION, SOLUTION EPIDURAL; INFILTRATION; INTRACAUDAL; PERINEURAL AS NEEDED
Status: DISCONTINUED | OUTPATIENT
Start: 2022-08-22 | End: 2022-08-22

## 2022-08-22 RX ORDER — ONDANSETRON 2 MG/ML
INJECTION INTRAMUSCULAR; INTRAVENOUS AS NEEDED
Status: DISCONTINUED | OUTPATIENT
Start: 2022-08-22 | End: 2022-08-22

## 2022-08-22 RX ORDER — SODIUM CHLORIDE, SODIUM LACTATE, POTASSIUM CHLORIDE, CALCIUM CHLORIDE 600; 310; 30; 20 MG/100ML; MG/100ML; MG/100ML; MG/100ML
100 INJECTION, SOLUTION INTRAVENOUS CONTINUOUS
Status: DISCONTINUED | OUTPATIENT
Start: 2022-08-22 | End: 2022-08-23 | Stop reason: HOSPADM

## 2022-08-22 RX ORDER — MAGNESIUM HYDROXIDE 1200 MG/15ML
LIQUID ORAL AS NEEDED
Status: DISCONTINUED | OUTPATIENT
Start: 2022-08-22 | End: 2022-08-22 | Stop reason: HOSPADM

## 2022-08-22 RX ORDER — FENTANYL CITRATE/PF 50 MCG/ML
25 SYRINGE (ML) INJECTION
Status: DISCONTINUED | OUTPATIENT
Start: 2022-08-22 | End: 2022-08-22 | Stop reason: HOSPADM

## 2022-08-22 RX ORDER — FENTANYL CITRATE 50 UG/ML
INJECTION, SOLUTION INTRAMUSCULAR; INTRAVENOUS AS NEEDED
Status: DISCONTINUED | OUTPATIENT
Start: 2022-08-22 | End: 2022-08-22

## 2022-08-22 RX ADMIN — CEFTRIAXONE 1000 MG: 1 INJECTION, SOLUTION INTRAVENOUS at 15:36

## 2022-08-22 RX ADMIN — ONDANSETRON 4 MG: 2 INJECTION INTRAMUSCULAR; INTRAVENOUS at 15:40

## 2022-08-22 RX ADMIN — PROPOFOL 150 MG: 10 INJECTION, EMULSION INTRAVENOUS at 15:35

## 2022-08-22 RX ADMIN — SODIUM CHLORIDE, SODIUM LACTATE, POTASSIUM CHLORIDE, AND CALCIUM CHLORIDE: .6; .31; .03; .02 INJECTION, SOLUTION INTRAVENOUS at 15:29

## 2022-08-22 RX ADMIN — CEFTRIAXONE 1000 MG: 1 INJECTION, SOLUTION INTRAVENOUS at 13:11

## 2022-08-22 RX ADMIN — AMLODIPINE BESYLATE 5 MG: 5 TABLET ORAL at 21:50

## 2022-08-22 RX ADMIN — LIDOCAINE HYDROCHLORIDE 50 MG: 10 INJECTION, SOLUTION EPIDURAL; INFILTRATION; INTRACAUDAL; PERINEURAL at 15:35

## 2022-08-22 RX ADMIN — SENNOSIDES AND DOCUSATE SODIUM 1 TABLET: 8.6; 5 TABLET ORAL at 21:50

## 2022-08-22 RX ADMIN — METOPROLOL SUCCINATE 25 MG: 25 TABLET, EXTENDED RELEASE ORAL at 21:50

## 2022-08-22 RX ADMIN — SODIUM CHLORIDE, SODIUM GLUCONATE, SODIUM ACETATE, POTASSIUM CHLORIDE, MAGNESIUM CHLORIDE, SODIUM PHOSPHATE, DIBASIC, AND POTASSIUM PHOSPHATE 100 ML/HR: .53; .5; .37; .037; .03; .012; .00082 INJECTION, SOLUTION INTRAVENOUS at 10:48

## 2022-08-22 RX ADMIN — DEXAMETHASONE SODIUM PHOSPHATE 10 MG: 10 INJECTION, SOLUTION INTRAMUSCULAR; INTRAVENOUS at 15:40

## 2022-08-22 RX ADMIN — AMLODIPINE BESYLATE 5 MG: 5 TABLET ORAL at 09:17

## 2022-08-22 RX ADMIN — SODIUM CHLORIDE, SODIUM LACTATE, POTASSIUM CHLORIDE, AND CALCIUM CHLORIDE 100 ML/HR: .6; .31; .03; .02 INJECTION, SOLUTION INTRAVENOUS at 17:46

## 2022-08-22 RX ADMIN — METOPROLOL SUCCINATE 25 MG: 25 TABLET, EXTENDED RELEASE ORAL at 09:20

## 2022-08-22 RX ADMIN — PROPOFOL 50 MG: 10 INJECTION, EMULSION INTRAVENOUS at 15:37

## 2022-08-22 RX ADMIN — FENTANYL CITRATE 50 MCG: 50 INJECTION INTRAMUSCULAR; INTRAVENOUS at 15:44

## 2022-08-22 RX ADMIN — SODIUM CHLORIDE 100 ML/HR: 0.9 INJECTION, SOLUTION INTRAVENOUS at 09:16

## 2022-08-22 NOTE — ANESTHESIA PREPROCEDURE EVALUATION
Procedure:  CYSTOSCOPY RETROGRADE PYELOGRAM WITH INSERTION STENT URETERAL (Left Bladder)    Relevant Problems   ANESTHESIA (within normal limits)      CARDIO   (+) Essential hypertension   (+) Nonrheumatic aortic valve insufficiency   (+) SVT (supraventricular tachycardia) (HCC)      ENDO   (-) Diabetes mellitus, type 2 (HCC)   (-) Hyperthyroidism   (-) Hypothyroidism      GI/HEPATIC   (+) GERD (gastroesophageal reflux disease)      /RENAL   (+) Hydronephrosis 2/2 L UPJ Obstruction   (+) Stage 2 chronic kidney disease      GYN   (+) Adenocarcinoma of endometrium, stage 3 (HCC)   (+) Uterine cancer (HCC)      HEMATOLOGY (within normal limits)      MUSCULOSKELETAL (within normal limits)      NEURO/PSYCH   (+) H/O small bowel obstruction      PULMONARY   (-) Asthma   (-) Sleep apnea        Physical Exam    Airway    Mallampati score: III  TM Distance: >3 FB  Neck ROM: full     Dental   No notable dental hx     Cardiovascular      Pulmonary      Other Findings        Anesthesia Plan  ASA Score- 3     Anesthesia Type- general with ASA Monitors  Additional Monitors:   Airway Plan: LMA  Plan Factors-Exercise tolerance (METS): >4 METS  Chart reviewed  EKG reviewed  Existing labs reviewed  Patient summary reviewed  Patient is not a current smoker  Induction- intravenous  Postoperative Plan- Plan for postoperative opioid use  Informed Consent- Anesthetic plan and risks discussed with patient

## 2022-08-22 NOTE — ANESTHESIA POSTPROCEDURE EVALUATION
Post-Op Assessment Note    CV Status:  Stable  Pain Score: 0    Pain management: adequate     Mental Status:  Sleepy   Hydration Status:  Euvolemic   PONV Controlled:  Controlled   Airway Patency:  Patent      Post Op Vitals Reviewed: Yes      Staff: CRNA   Comments: vss sv nonobstructed uneventful        No complications documented      /61 (08/22/22 1601)    Temp 97 8 °F (36 6 °C) (08/22/22 1601)    Pulse 63 (08/22/22 1601)   Resp 16 (08/22/22 1601)    SpO2 99 % (08/22/22 1601)

## 2022-08-22 NOTE — ASSESSMENT & PLAN NOTE
Patient has established history of hypertension at baseline  On amlodipine and metoprolol PTA  Most recent blood pressure reading: Blood Pressure: 153/67      Assessment:   - Patient well controlled on home regimen, with no concern for nephrotoxic anti-HTN medications       Plan:  - Continue PTA amlodipine and metoprolol

## 2022-08-22 NOTE — ASSESSMENT & PLAN NOTE
Lab Results   Component Value Date    EGFR 78 08/21/2022    EGFR 84 06/04/2022    EGFR 86 06/03/2022    EGFR 70 06/02/2022    EGFR 90 05/20/2022    CREATININE 0 76 08/21/2022    CREATININE 0 71 06/04/2022    CREATININE 0 69 06/03/2022    CREATININE 0 83 06/02/2022    CREATININE 0 61 05/20/2022    BUN 10 08/21/2022    BUN 9 06/04/2022    BUN 15 06/03/2022    BUN 15 06/02/2022    BUN 8 05/20/2022     Patient has history of chronic UTI, chronic hydronephrosis   Baseline Cr level approx 0 7  eGFR on admission 78  Renal currently function at patient's baseline  No significant rise in Cr from baseline       Assessment:   - Patient w/ stable stage II CKD at baseline presents w/ concern for UTI and hydroureter s/p UPJ stent, but without concern for CHANDNI at this time       Plan:   - monitor kidney function with daily BMP  - avoid nephrotoxic medications   - consider nephrology consult, if needed Fall Risk

## 2022-08-22 NOTE — OP NOTE
OPERATIVE REPORT  PATIENT NAME: Chanelle Poster    :  1950  MRN: [id number]  Pt Location: [facility]    SURGERY DATE: 2022    Surgeon(s) and Role:     Abimael Espitia MD - Primary    Preop Diagnosis:  Hydronephrosis of left kidney [N13 30]  Chronic left UPJ obstruction    Post-Op Diagnosis Codes: * Hydronephrosis of left kidney [N13 30]  Chronic left UPJ obstruction    Procedure(s) (LRB):  CYSTOSCOPY RETROGRADE PYELOGRAM WITH EXCHANGE STENT URETERAL (Left)    Specimen(s):  ID Type Source Tests Collected by Time Destination   A :  Urine Urine, Cystoscopic URINE CULTURE Abimael Espitia MD 2022 1548        Estimated Blood Loss:   Minimal    Drains:  Ureteral Internal Stent Left ureter 6 Fr  (Active)   Number of days: 0       Anesthesia Type:   General    Operative Indications:  Hydronephrosis of left kidney [N13 30]      Operative Findings:  Severe left-sided hydronephrosis  Placement of left 22-6 [place] double-J ureteral stent without string    Complications:   None    Procedure and Technique:  Roma Quan is a 77-year-old female with a known chronic left UPJ obstruction likely congenital   She is followed by Dr Jhony Tapia  She has a prior history of gyn malignancy treated with surgery and pelvic radiation  Mag 3 renal scan with Lasix shows 80/20 split renal function  She is overdue for a left ureteral stent exchange  She has been hospitalized with left flank pain  She has no sign of fever, infection, or leukocytosis  Risk and benefit of cystoscopy with stent exchange were discussed and reviewed  Informed consent was obtained the patient was brought to the operative room on in   After the smooth induction of general LMA, patient was placed in dorsal lithotomy position  She was prepped and draped in sterile fashion  Was inserted  The bladder was thoroughly inspected there were some inflammatory changes from the lesions otherwise    The stent had very mild encrustation noted  I was able to grasp the stent and easily remove it while placing a wire into the dilated left collecting system  Five [employer] [place] open-ended catheter was inserted and severe left-sided hydronephrosis was appreciated  With limited contrast 1 small area consistent with the UPJ was visualized  The wire was reinserted and a left 22-6 [place] double-J ureteral stent was then placed  Proximal coil was appreciated within the left renal pelvis and the distal coil was seen within the bladder  There was no string left in place  The bladder was emptied the cystoscope was removed  Overall patient tolerated the procedure well and there were no complications  The patient was extubated in the operating room transferred the PACU in stable condition at the conclusion the case      Patient Disposition:  PACU stable and extubated      SIGNATURE: Albino Clark MD  DATE: August 22, 2022  TIME: 3:53 PM

## 2022-08-22 NOTE — PLAN OF CARE
Problem: Potential for Falls  Goal: Patient will remain free of falls  Description: INTERVENTIONS:  - Educate patient/family on patient safety including physical limitations  - Instruct patient to call for assistance with activity   - Consult OT/PT to assist with strengthening/mobility   - Keep Call bell within reach  - Keep bed low and locked with side rails adjusted as appropriate  - Keep care items and personal belongings within reach  - Initiate and maintain comfort rounds  - Obtain necessary fall risk management equipment  - Apply yellow socks and bracelet for high fall risk patients  - Consider moving patient to room near nurses station  Outcome: Progressing     Problem: MOBILITY - ADULT  Goal: Maintain or return to baseline ADL function  Description: INTERVENTIONS:  -  Assess patient's ability to carry out ADLs; assess patient's baseline for ADL function and identify physical deficits which impact ability to perform ADLs (bathing, care of mouth/teeth, toileting, grooming, dressing, etc )  - Assess/evaluate cause of self-care deficits   - Assess range of motion  - Assess patient's mobility; develop plan if impaired  - Assess patient's need for assistive devices and provide as appropriate  - Encourage maximum independence but intervene and supervise when necessary  - Involve family in performance of ADLs  - Assess for home care needs following discharge   - Consider OT consult to assist with ADL evaluation and planning for discharge  - Provide patient education as appropriate  Outcome: Progressing  Goal: Maintains/Returns to pre admission functional level  Description: INTERVENTIONS:  - Perform BMAT or MOVE assessment daily    - Set and communicate daily mobility goal to care team and patient/family/caregiver     - Collaborate with rehabilitation services on mobility goals if consulted  - Perform Range of Motion   - Reposition patient   - Stand patient   - Ambulate patient  - Out of bed to chair  - Out of bed for meals  - Out of bed for toileting  - Record patient progress and toleration of activity level   Outcome: Progressing

## 2022-08-22 NOTE — PROGRESS NOTES
Today exchange Katerina's left ureteral stent  This has been in place for left UPJ obstruction  She is managed by Dr Argueta that she is overdue for stent exchange and has no sign of fever or infection  Exchange was performed in the operating room today without difficulty  The patient is clear for discharge from urologic standpoint on the evening of August 22, 2022 as long as vital signs are stable, she is afebrile, pain is controlled, she tolerates a diet and is able to void  I recommend 3 days of p o  Keflex for discharge  Follow up in the outpatient setting with Dr Argueta

## 2022-08-22 NOTE — CASE MANAGEMENT
Case Management Assessment & Discharge Planning Note    Patient name Alyse LOPEZ /S Luite Azeem 87 [id number] MRN [id number]  : 1950 Date 2022       Current Admission Date: 2022  Current Admission Diagnosis:Hydronephrosis 2/2 L UPJ Obstruction   Patient Active Problem List    Diagnosis Date Noted    Hydronephrosis 2/2 L UPJ Obstruction 2022    Stage 2 chronic kidney disease 2022    Abnormal urinalysis 10/17/2021    Leukocytosis 10/17/2021    Recurrent UTI 10/15/2021    Nonrheumatic aortic valve insufficiency 2021    Obstructive uropathy 10/03/2016    SVT (supraventricular tachycardia) ([facility]) 2016    Uterine cancer ([facility]) 2016    Thigh pain, musculoskeletal, right 2015    Essential hypertension 2015    H/O small bowel obstruction 2015    Adenocarcinoma of endometrium, stage 3 ([facility]) 10/10/2012      LOS (days): 1  Geometric Mean LOS (GMLOS) (days):   Days to GMLOS:     OBJECTIVE:    Risk of Unplanned Readmission Score: 10 88         Current admission status: Inpatient       Preferred Pharmacy:   [place] [address] [id number] [facility] [place] [address]  Phone: [phone] Fax: [phone]    Primary Care Provider: Kendall Reddy DO    Primary Insurance: MEDICARE  Secondary Insurance: BLUE CROSS    ASSESSMENT:    Readmission Root Cause  30 Day Readmission: No    Patient Information  Admitted from[de-identified] Home  Mental Status: Alert  During Assessment patient was accompanied by: Not accompanied during assessment  Assessment information provided by[de-identified] Patient  Support Systems: Self, Spouse/significant other, Family members  [place] of Residence: [address] [place] [address] do you live in?: [place] entry access options   Select all that apply : Stairs  Number of steps to enter home : 2  Do the steps have railings?: Yes  Type of Current Residence: [address] [place] home  Upon entering residence, is there a bedroom on the main floor (no further steps)?: Yes  Upon entering residence, is there a bathroom on the main floor (no further steps)?: Yes  In the last 12 months, was there a time when you were not able to pay the mortgage or rent on time?: No  In the last 12 months, how many places have you lived?: 1  In the last 12 months, was there a time when you did not have a steady place to sleep or slept in a shelter (including now)?: No  Homeless/housing insecurity resource given?: No  Living Arrangements: Lives w/ Spouse/significant other  Is patient a ?: No    Activities of Daily Living Prior to Admission  Functional Status: Independent  Completes ADLs independently?: Yes  Ambulates independently?: Yes  Does patient use assisted devices?: No  Does patient currently own DME?: No  Does patient have a history of Outpatient Therapy (PT/OT)?: No  Does the patient have a history of Short-Term Rehab?: No  Does patient have a history of HHC?: No  Does patient currently have [employer] ?: No    Patient Information Continued  Income Source: Pension/care home  Does patient have prescription coverage?: Yes  Within the past 12 months, you worried that your food would run out before you got the money to buy more : Never true  Within the past 12 months, the food you bought just didn't last and you didn't have money to get more : Never true  Food insecurity resource given?: No  Does patient receive dialysis treatments?: No  Does patient have a history of substance abuse?: No  Does patient have a history of Mental Health Diagnosis?: No    PHQ 2/9 Screening   Reviewed PHQ 2/9 Depression Screening Score?: No    Means of Transportation  Means of Transport to Appts[de-identified] Family transport  In the past 12 months, has lack of transportation kept you from medical appointments or from getting medications?: No  In the past 12 months, has lack of transportation kept you from meetings, work, or from getting things needed for daily living?: No  Was application for public transport provided?: No    DISCHARGE DETAILS:    Discharge planning discussed with[de-identified] Patient  Freedom of Choice: Yes     CM contacted family/caregiver?: No- see comments (Declined call to spouse)  Were Treatment Team discharge recommendations reviewed with patient/caregiver?: Yes  Did patient/caregiver verbalize understanding of patient care needs?: Yes  Were patient/caregiver advised of the risks associated with not following Treatment Team discharge recommendations?: Yes    [address]         Is the patient interested in [facility] at discharge?: No    DME Referral Provided  Referral made for DME?: No    Would you like to participate in our [address] service program?  : No - Declined    Treatment Team Recommendation: Home  Discharge Destination Plan[de-identified] Home  Transport at Discharge : Family

## 2022-08-22 NOTE — PROGRESS NOTES
[facility]  Progress Note - [address]alis [address] 1950, 66 y o  female MRN: [id number]  Unit/Bed#: OR POOL Encounter: [id number]  Primary Care Provider: Mary Carmen Huber DO   Date and time admitted to hospital: 8/21/2022 10:26 AM    * Hydronephrosis 2/2 L UPJ Obstruction  Assessment & Plan  Patient had L UPJ stent placed 10/21/2021 for long term hydronephrosis   Testing after placement demonstrated persistent obstruction that would require continued stent management q3-12 months  First stent revision 2/25/22  Next revision originally planned for 8/26/22      CT renal stone study abdomen pelvis wo contrast   Final Result by Isabel Quiñonez MD (08/21 1254)      Massively dilated left renal collecting system despite the presence of the nephroureteral stent  This would suggest stent malfunction  Gallstones      Persistent interstitial opacities in the lung bases of uncertain significance  Workstation performed: [id number]             Assessment:  - Patient with chronic hydronephrosis and L UPJ stent presenting with concern for UTI five days before scheduled stent revision procedure  Plan:   - Stent revision today, 8/22/22  - monitor renal function with daily BMP   - urology following   - follow-up with urology outpatient after discharge         Recurrent UTI  Assessment & Plan  Patient presents with foggy urine, lower abdominal pressure, dysuria, increased urinary frequency, chills, foul smelling urine x 1 day and a history of chronic UTIs  Patient is s/p L UPJ placement in 10/21/21 and s/p stent revision 2/25/22 with next revision scheduled for 8/26/22  Physical exam reveals lower abdominal tenderness to palpation but no CVA tenderness  Afebrile on admission with no leukocytosis       UA on admission:     Results from last 7 days   Lab Units 08/21/22  1035   LEUKOCYTES UA  Large*   NITRITE UA  Negative   GLUCOSE UA mg/dl Negative   KETONES UA mg/dl Negative   BLOOD UA  Large*   WBC UA /hpf Innumerable*   RBC UA /hpf 30-50*   BACTERIA UA /hpf Moderate*       8/22 Urine cultures pending      Assessment:   - Complicated UTI in postmenopausal female w/ L UPJ stent but currently not showing signs or symptoms of urosepsis or pyelonephritis  Plan:   - monitor vital signs for evidence of sepsis   - monitor daily CBC for evidence of infection   - urine cultures pending   - patient started on rocephin in ED, continued on admission q24h (D2)  - serial abdominal exams - follow for evidence of pyelonephritis   - consider starting carbepenem if cultures positive for ESBL   - no previous UTI culture data found on chart review    Stage 2 chronic kidney disease  Assessment & Plan  Lab Results   Component Value Date    EGFR 78 08/21/2022    EGFR 84 06/04/2022    EGFR 86 06/03/2022    EGFR 70 06/02/2022    EGFR 90 05/20/2022    CREATININE 0 76 08/21/2022    CREATININE 0 71 06/04/2022    CREATININE 0 69 06/03/2022    CREATININE 0 83 06/02/2022    CREATININE 0 61 05/20/2022    BUN 10 08/21/2022    BUN 9 06/04/2022    BUN 15 06/03/2022    BUN 15 06/02/2022    BUN 8 05/20/2022     Patient has history of chronic UTI, chronic hydronephrosis   Baseline Cr level approx 0 7  eGFR on admission 78  Renal currently function at patient's baseline  No significant rise in Cr from baseline       Assessment:   - Patient w/ stable stage II CKD at baseline presents w/ concern for UTI and hydroureter s/p UPJ stent, but without concern for CHANDNI at this time       Plan:   - monitor kidney function with daily BMP  - avoid nephrotoxic medications   - consider nephrology consult, if needed    H/O small bowel obstruction  Assessment & Plan  Patient has a history of small bowel obstruction and history of hysterectomy after endometrial cancer   Currently reporting good bowel movements   Plan for surgery on 8/22 which could slow down bowel movements    Receiving PRN oxycodone for pain, which also inhibits bowel motility Assessment:   - Patient with several risk factors for small bowel obstruction, including prior abdominal surgery with possible intra-abdominal adhesions, prior history of SBO, opioid analgesics, and scheduled inpatient surgery who is currently having good bowel movements  Plan:   - Monitor serial abdominal exams, stool output   - Bowel regimen    - Senna daily    - Continue adding Miralax if needed   - Frequent evaluations post-op   - Ensure patient has bowel movement prior to d/c    Essential hypertension  Assessment & Plan  Patient has established history of hypertension at baseline  On amlodipine and metoprolol PTA  Most recent blood pressure reading: Blood Pressure: 153/67      Assessment:   - Patient well controlled on home regimen, with no concern for nephrotoxic anti-HTN medications  Plan:  - Continue PTA amlodipine and metoprolol         VTE Pharmacologic Prophylaxis: VTE Score: 3 Moderate Risk (Score 3-4) - Pharmacological DVT Prophylaxis Ordered: heparin  Patient has been refusing  Patient Centered Rounds: I performed bedside rounds with nursing staff today  Discussions with Specialists or Other Care Team Provider: Urology    Education and Discussions with Family / Patient: Updated  () at bedside  Current Length of Stay: 1 day(s)  Current Patient Status: Inpatient   Discharge Plan: Anticipate discharge tomorrow to home  Code Status: Level 1 - Full Code    Subjective:   Nursing reports no acute overnight events  Patient seen and examined today at bedside  She is resting comfortably and ready for her stent revision today  She denied any fevers, chills, nausea, vomiting, diarrhea, constipation, chest pain, or shortness of breath  Does endorse some continued lower abdominal discomfrot she describes as "pressure " Also denies any burning with urination   She is refusing the heparin and SCDs for VTE prophylaxis, despite reassurance that they are safe for her procedure  Patient had itching reaction to NSS fluids, but was able to tolerate Plasmalyte without concerns  No concerns for anaphylactic reaction  Objective:     Vitals:   Temp (24hrs), Av 2 °F (36 8 °C), Min:97 9 °F (36 6 °C), Max:98 5 °F (36 9 °C)    Temp:  [97 9 °F (36 6 °C)-98 5 °F (36 9 °C)] 97 9 °F (36 6 °C)  HR:  [51-58] 57  Resp:  [16-18] 16  BP: (142-153)/(66-72) 153/67  SpO2:  [95 %-97 %] 97 %  Body mass index is 24 03 kg/m²  Input and Output Summary (last 24 hours): Intake/Output Summary (Last 24 hours) at 2022 1553  Last data filed at 2022 1552  Gross per 24 hour   Intake 400 ml   Output --   Net 400 ml       Physical Exam:   Physical Exam  Vitals and nursing note reviewed  Constitutional:       General: She is not in acute distress  Appearance: Normal appearance  She is well-developed  She is not ill-appearing, toxic-appearing or diaphoretic  HENT:      Head: Normocephalic and atraumatic  Mouth/Throat:      Mouth: Mucous membranes are moist    Eyes:      General:         Right eye: No discharge  Left eye: No discharge  Conjunctiva/sclera: Conjunctivae normal    Cardiovascular:      Rate and Rhythm: Normal rate and regular rhythm  Pulses: Normal pulses  Heart sounds: Normal heart sounds  No murmur heard  No friction rub  No gallop  Pulmonary:      Effort: Pulmonary effort is normal  No respiratory distress  Breath sounds: Normal breath sounds  Abdominal:      General: Bowel sounds are normal  There is no distension  Palpations: Abdomen is soft  Tenderness: There is no abdominal tenderness  There is no right CVA tenderness, left CVA tenderness, guarding or rebound  Musculoskeletal:         General: No swelling or tenderness  Cervical back: Neck supple  Right lower leg: No edema  Left lower leg: No edema  Skin:     General: Skin is warm and dry        Capillary Refill: Capillary refill takes less than 2 seconds  Coloration: Skin is not jaundiced  Findings: No erythema  Neurological:      Mental Status: She is alert  Mental status is at baseline  Psychiatric:         Mood and Affect: Mood normal          Behavior: Behavior normal          Thought Content:  Thought content normal          Judgment: Judgment normal           Additional Data:     Labs:  Results from last 7 days   Lab Units 08/22/22  0614   WBC Thousand/uL 4 20*   HEMOGLOBIN g/dL 11 8   HEMATOCRIT % 35 5   PLATELETS Thousands/uL 187   NEUTROS PCT % 59   LYMPHS PCT % 23   MONOS PCT % 15*   EOS PCT % 2     Results from last 7 days   Lab Units 08/22/22  0614   SODIUM mmol/L 140   POTASSIUM mmol/L 3 8   CHLORIDE mmol/L 107   CO2 mmol/L 27   BUN mg/dL 10   CREATININE mg/dL 0 64   ANION GAP mmol/L 6   CALCIUM mg/dL 8 5   GLUCOSE RANDOM mg/dL 100                       Lines/Drains:  Invasive Devices  Report    Peripheral Intravenous Line  Duration           Peripheral IV 08/22/22 Right;Dorsal (posterior) Hand <1 day          Drain  Duration           Ureteral Internal Stent Left ureter 6 Fr  <1 day          Airway  Duration           Supraglottic Airway  4 <1 day                      Imaging: Reviewed radiology reports from this admission including: abdominal/pelvic CT    Recent Cultures (last 7 days):         Last 24 Hours Medication List:   Current Facility-Administered Medications   Medication Dose Route Frequency Provider Last Rate    [MAR Hold] acetaminophen  975 mg Oral Q8H PRN Kelly Perla MD      [facility] Hold] amLODIPine  5 mg Oral BID Kelly Perla MD      [facility] Hold] cefTRIAXone  1,000 mg Intravenous Q24H Kelly Perla MD 1,000 mg (08/22/22 1311)    heparin (porcine)  5,000 Units Subcutaneous [facility] Kelly Perla MD      [facility] Hold] HYDROmorphone  0 2 mg Intravenous Q4H PRN Kelyl Perla MD      iohexol    PRN Maricruz Orozco MD      metoprolol succinate  25 mg Oral BID Brain Dawn, MD      multi-electrolyte  100 mL/hr Intravenous Continuous Mary Jo Cifuentes  mL/hr (08/22/22 1048)    [[facility] Hold] ondansetron  4 mg Intravenous Q6H PRN Mary Jo Cifuentes MD      [facility] Hold] oxyCODONE  2 5 mg Oral Q6H PRN Mary Jo Cifuentes MD      [facility] Hold] oxyCODONE  5 mg Oral Q6H PRN Mary Jo Cifuentes MD      [facility] Hold] senna-docusate sodium  1 tablet Oral HS Mary Jo Cifuentes MD      sterile water    PRN Sam Hendrix MD       Facility-Administered Medications Ordered in Other Encounters   Medication Dose Route Frequency Provider Last Rate    dexamethasone (PF)   Intravenous PRN Marline Maureen, DO      fentanyl citrate (PF)   Intravenous PRN Michael Spine Charna Roam, DO      lactated ringers   Intravenous Continuous PRN Marline Reddy, DO Stopped (08/22/22 1552)    lidocaine (PF)   Intravenous PRN Marline Maureen, DO      ondansetron   Intravenous PRN Michael Spine Charna Roam, DO      propofol   Intravenous PRN Marline Reddy, DO          Today, Patient Was Seen By: Mary Jo Cifuentes MD    **Please Note: This note may have been constructed using a voice recognition system  **

## 2022-08-22 NOTE — PROGRESS NOTES
Progress Note - Urology  Buck Ridley 66 y o  female MRN: [id number]  Unit/Bed#: S -01 Encounter: [id number]    Assessment& Plan:    UPJ obstruction:  -patient with known chronic UPJ obstruction, declining a pyeloplasty currently managed with chronic ureteral stent with exchanges  -patient presenting due to persistent flank pain  -CT scan revealed severe left hydronephrosis despite favorably position left ureteral stents suggesting stent failure  -patient scheduled for ureteral stent exchange on 08/26, given CT findings, will expedite ureteral stent exchange for today   -patient currently NPO  -continue IV fluids  -continue antibiotics, adjust based off a culture sensitivity data  -pain control  -antiemetics  -patient declining Flomax due to sulfa allergy may continue to hold  -discussed ureteral stent exchange in depth with patient  Discussed risks including bleeding, infection, possible need for PCN  Consent signed at bedside   -keep NPO    Plan for ureteral stent exchange today    Subjective/Objective   Chief Complaint:  None    Subjective:   Patient is sitting comfortably in bed no acute distress  She reports she is feeling improved since yesterday  Denies any nausea, vomiting, fevers, chills  flank pain improved, frequency and urgency improved  Along with consistency of urine  Objective:     Blood pressure 150/70, pulse (!) 51, temperature 98 5 °F (36 9 °C), resp  rate 18, height 5' 4" (1 626 m), weight 63 5 kg (140 lb), SpO2 97 %  ,Body mass index is 24 03 kg/m²  Intake/Output Summary (Last 24 hours) at 8/22/2022 1151  Last data filed at 8/21/2022 1228  Gross per 24 hour   Intake 86 67 ml   Output --   Net 86 67 ml       Invasive Devices  Report    Peripheral Intravenous Line  Duration           Peripheral IV 08/21/22 Right Antecubital 1 day                Physical Exam  Constitutional:       General: She is not in acute distress  Appearance: She is normal weight   She is not ill-appearing, toxic-appearing or diaphoretic  HENT:      Head: Normocephalic and atraumatic  Right Ear: External ear normal       Left Ear: External ear normal       Nose: Nose normal       Mouth/Throat:      Pharynx: Oropharynx is clear  Eyes:      General: No scleral icterus  Conjunctiva/sclera: Conjunctivae normal    Cardiovascular:      Rate and Rhythm: Normal rate and regular rhythm  Pulses: Normal pulses  Heart sounds: No murmur heard  No friction rub  No gallop  Pulmonary:      Effort: Pulmonary effort is normal  No respiratory distress  Breath sounds: No wheezing, rhonchi or rales  Abdominal:      General: Bowel sounds are normal  There is no distension  Tenderness: There is no abdominal tenderness  Musculoskeletal:         General: Normal range of motion  Cervical back: Normal range of motion  Skin:     General: Skin is warm and dry  Neurological:      General: No focal deficit present  Mental Status: She is alert and oriented to person, place, and time  Psychiatric:         Mood and Affect: Mood normal          Behavior: Behavior normal          Thought Content: Thought content normal          Judgment: Judgment normal            Lab, Imaging and other studies:I have personally reviewed pertinent lab results     Lab Results   Component Value Date    WBC 4 20 (L) 08/22/2022    HGB 11 8 08/22/2022    HCT 35 5 08/22/2022    MCV 96 08/22/2022     08/22/2022     Lab Results   Component Value Date    SODIUM 140 08/22/2022    K 3 8 08/22/2022     08/22/2022    CO2 27 08/22/2022    BUN 10 08/22/2022    CREATININE 0 64 08/22/2022    GLUC 100 08/22/2022    CALCIUM 8 5 08/22/2022         VTE Pharmacologic Prophylaxis: Heparin  VTE Mechanical Prophylaxis: sequential compression device      Thien Arrieta PA-C

## 2022-08-22 NOTE — ASSESSMENT & PLAN NOTE
Patient has a history of small bowel obstruction and history of hysterectomy after endometrial cancer   Currently reporting good bowel movements   Plan for surgery on 8/22 which could slow down bowel movements    Receiving PRN oxycodone for pain, which also inhibits bowel motility     Assessment:   - Patient with several risk factors for small bowel obstruction, including prior abdominal surgery with possible intra-abdominal adhesions, prior history of SBO, opioid analgesics, and scheduled inpatient surgery who is currently having good bowel movements       Plan:   - Monitor serial abdominal exams, stool output   - Bowel regimen    - Senna daily    - Continue adding Miralax if needed   - Frequent evaluations post-op   - Ensure patient has bowel movement prior to d/c

## 2022-08-22 NOTE — ASSESSMENT & PLAN NOTE
Patient had L UPJ stent placed 10/21/2021 for long term hydronephrosis   Testing after placement demonstrated persistent obstruction that would require continued stent management q3-12 months  First stent revision 2/25/22  Next revision originally planned for 8/26/22      CT renal stone study abdomen pelvis wo contrast   Final Result by Nova Wilson MD (08/21 1254)      Massively dilated left renal collecting system despite the presence of the nephroureteral stent  This would suggest stent malfunction  Gallstones      Persistent interstitial opacities in the lung bases of uncertain significance  Workstation performed: [id number]             Assessment:  - Patient with chronic hydronephrosis and L UPJ stent presenting with concern for UTI five days before scheduled stent revision procedure       Plan:   - Stent revision today, 8/22/22  - monitor renal function with daily BMP   - urology following   - follow-up with urology outpatient after discharge

## 2022-08-22 NOTE — ASSESSMENT & PLAN NOTE
Patient presents with foggy urine, lower abdominal pressure, dysuria, increased urinary frequency, chills, foul smelling urine x 1 day and a history of chronic UTIs  Patient is s/p L UPJ placement in 10/21/21 and s/p stent revision 2/25/22 with next revision scheduled for 8/26/22  Physical exam reveals lower abdominal tenderness to palpation but no CVA tenderness  Afebrile on admission with no leukocytosis  UA on admission:     Results from last 7 days   Lab Units 08/21/22  1035   LEUKOCYTES UA  Large*   NITRITE UA  Negative   GLUCOSE UA mg/dl Negative   KETONES UA mg/dl Negative   BLOOD UA  Large*   WBC UA /hpf Innumerable*   RBC UA /hpf 30-50*   BACTERIA UA /hpf Moderate*       8/22 Urine cultures pending      Assessment:   - Complicated UTI in postmenopausal female w/ L UPJ stent but currently not showing signs or symptoms of urosepsis or pyelonephritis       Plan:   - monitor vital signs for evidence of sepsis   - monitor daily CBC for evidence of infection   - urine cultures pending   - patient started on rocephin in ED, continued on admission q24h (D2)  - serial abdominal exams - follow for evidence of pyelonephritis   - consider starting carbepenem if cultures positive for ESBL   - no previous UTI culture data found on chart review

## 2022-08-23 VITALS
TEMPERATURE: 98.2 F | HEIGHT: 64 IN | WEIGHT: 140 LBS | DIASTOLIC BLOOD PRESSURE: 71 MMHG | RESPIRATION RATE: 18 BRPM | HEART RATE: 64 BPM | OXYGEN SATURATION: 97 % | SYSTOLIC BLOOD PRESSURE: 147 MMHG | BODY MASS INDEX: 23.9 KG/M2

## 2022-08-23 PROBLEM — N13.30 HYDRONEPHROSIS: Status: RESOLVED | Noted: 2022-08-21 | Resolved: 2022-08-23

## 2022-08-23 LAB
ANION GAP SERPL CALCULATED.3IONS-SCNC: 6 MMOL/L (ref 4–13)
BACTERIA UR CULT: ABNORMAL
BACTERIA UR CULT: ABNORMAL
BASOPHILS # BLD AUTO: 0.01 THOUSANDS/ΜL (ref 0–0.1)
BASOPHILS NFR BLD AUTO: 0 % (ref 0–1)
BUN SERPL-MCNC: 14 MG/DL (ref 5–25)
CALCIUM SERPL-MCNC: 9.1 MG/DL (ref 8.4–10.2)
CHLORIDE SERPL-SCNC: 107 MMOL/L (ref 96–108)
CO2 SERPL-SCNC: 26 MMOL/L (ref 21–32)
CREAT SERPL-MCNC: 0.62 MG/DL (ref 0.6–1.3)
EOSINOPHIL # BLD AUTO: 0 THOUSAND/ΜL (ref 0–0.61)
EOSINOPHIL NFR BLD AUTO: 0 % (ref 0–6)
ERYTHROCYTE [DISTWIDTH] IN BLOOD BY AUTOMATED COUNT: 13.1 % (ref 11.6–15.1)
GFR SERPL CREATININE-BSD FRML MDRD: 89 ML/MIN/1.73SQ M
GLUCOSE SERPL-MCNC: 158 MG/DL (ref 65–140)
HCT VFR BLD AUTO: 37.6 % (ref 34.8–46.1)
HGB BLD-MCNC: 12.6 G/DL (ref 11.5–15.4)
IMM GRANULOCYTES # BLD AUTO: 0.02 THOUSAND/UL (ref 0–0.2)
IMM GRANULOCYTES NFR BLD AUTO: 1 % (ref 0–2)
LYMPHOCYTES # BLD AUTO: 0.5 THOUSANDS/ΜL (ref 0.6–4.47)
LYMPHOCYTES NFR BLD AUTO: 11 % (ref 14–44)
MCH RBC QN AUTO: 31.7 PG (ref 26.8–34.3)
MCHC RBC AUTO-ENTMCNC: 33.5 G/DL (ref 31.4–37.4)
MCV RBC AUTO: 95 FL (ref 82–98)
MONOCYTES # BLD AUTO: 0.19 THOUSAND/ΜL (ref 0.17–1.22)
MONOCYTES NFR BLD AUTO: 4 % (ref 4–12)
NEUTROPHILS # BLD AUTO: 3.65 THOUSANDS/ΜL (ref 1.85–7.62)
NEUTS SEG NFR BLD AUTO: 84 % (ref 43–75)
NRBC BLD AUTO-RTO: 0 /100 WBCS
PLATELET # BLD AUTO: 221 THOUSANDS/UL (ref 149–390)
PMV BLD AUTO: 10.2 FL (ref 8.9–12.7)
POTASSIUM SERPL-SCNC: 4.4 MMOL/L (ref 3.5–5.3)
RBC # BLD AUTO: 3.98 MILLION/UL (ref 3.81–5.12)
SODIUM SERPL-SCNC: 139 MMOL/L (ref 135–147)
WBC # BLD AUTO: 4.37 THOUSAND/UL (ref 4.31–10.16)

## 2022-08-23 PROCEDURE — 80048 BASIC METABOLIC PNL TOTAL CA: CPT

## 2022-08-23 PROCEDURE — 99239 HOSP IP/OBS DSCHRG MGMT >30: CPT | Performed by: GENERAL PRACTICE

## 2022-08-23 PROCEDURE — 85025 COMPLETE CBC W/AUTO DIFF WBC: CPT

## 2022-08-23 RX ORDER — CEPHALEXIN 500 MG/1
500 CAPSULE ORAL EVERY 6 HOURS SCHEDULED
Qty: 12 CAPSULE | Refills: 0 | Status: SHIPPED | OUTPATIENT
Start: 2022-08-23 | End: 2022-08-26

## 2022-08-23 RX ORDER — ACETAMINOPHEN 325 MG/1
650 TABLET ORAL EVERY 8 HOURS PRN
Qty: 14 TABLET | Refills: 0 | Status: SHIPPED | OUTPATIENT
Start: 2022-08-23 | End: 2022-09-13

## 2022-08-23 RX ADMIN — SODIUM CHLORIDE, SODIUM LACTATE, POTASSIUM CHLORIDE, AND CALCIUM CHLORIDE 100 ML/HR: .6; .31; .03; .02 INJECTION, SOLUTION INTRAVENOUS at 03:47

## 2022-08-23 RX ADMIN — METOPROLOL SUCCINATE 25 MG: 25 TABLET, EXTENDED RELEASE ORAL at 08:46

## 2022-08-23 RX ADMIN — CEFTRIAXONE 1000 MG: 1 INJECTION, SOLUTION INTRAVENOUS at 11:46

## 2022-08-23 RX ADMIN — AMLODIPINE BESYLATE 5 MG: 5 TABLET ORAL at 08:46

## 2022-08-23 NOTE — ASSESSMENT & PLAN NOTE
Patient has history of chronic UTI, chronic hydronephrosis   Baseline Cr level approx 0 7  eGFR on admission 78  Discharge: Cr 0 62, eGFR 89  Renal currently function at patient's baseline  No significant rise in Cr from baseline       Assessment:   - Patient w/ stable stage II CKD at baseline presents w/ concern for UTI and hydroureter s/p UPJ stent, but without concern for CHANDNI during admission or at time of discharge       Plan:   - Avoid nephrotoxic medications - use tylenol for pain  - Follow up with PCP outpatient

## 2022-08-23 NOTE — ASSESSMENT & PLAN NOTE
Patient has a history of small bowel obstruction and history of hysterectomy after endometrial cancer   Currently reporting good bowel movements   Plan for surgery on 8/22 which could slow down bowel movements    Received PRN oxycodone for pain, which also inhibits bowel motility   Patient passing flatus without problem and denies any abdominal pain at discharge     Assessment:   - Patient with several risk factors for small bowel obstruction, including prior abdominal surgery with possible intra-abdominal adhesions, prior history of SBO, opioid analgesics, and recent cystoscopy has not had bowel movement but is passing flatus without difficulty       Plan:   -  patient about signs/symptoms of SBO   - Follow up with PCP

## 2022-08-23 NOTE — DISCHARGE INSTR - AVS FIRST PAGE
Dear Christy Rowland,     It was our pleasure to care for you here at [facility]  It is our hope that we were always able to exceed the expected standards for your care during your stay  You were hospitalized due to urinary tract infection and hydronephrosis  You were cared for on the fourth floor by Christal Ricci MD under the service of Jaylene Dejesus DO with the [facility] who covers for your primary care physician (PCP), Aline De La Rosa DO, while you were hospitalized  If you have any questions or concerns related to this hospitalization, you may contact us at [phone]  For follow up as well as any medication refills, we recommend that you follow up with your primary care physician  A registered nurse will reach out to you by phone within a few days after your discharge to answer any additional questions that you may have after going home  However, at this time we provide for you here, the most important instructions / recommendations at discharge:     Notable Medication Adjustments -   Please complete 3 day course of keflex antibiotic for your UTI   Testing Required after Discharge -   None  Important follow up information -   Please follow up with your PCP within 1 week of discharge   Please follow up with your urologist  Other Instructions -   None  Please review this entire after visit summary as additional general instructions including medication list, appointments, activity, diet, any pertinent wound care, and other additional recommendations from your care team that may be provided for you        Sincerely,     Christal Ricci MD

## 2022-08-23 NOTE — DISCHARGE SUMMARY
[facility]  Discharge- Diannia Lowers 1950, 66 y o  female MRN: [id number]  Unit/Bed#: S -01 Encounter: [id number]  Primary Care Provider: Robert Lawton DO   Date and time admitted to hospital: 8/21/2022 10:26 AM    * Hydronephrosis 2/2 L UPJ Obstruction  Assessment & Plan  Patient had L UPJ stent placed 10/21/2021 for long term hydronephrosis   Testing after placement demonstrated persistent obstruction that would require continued stent management q3-12 months  First stent revision 2/25/22  Next revision originally planned for 8/26/22 but patient presented to ED w/ symptoms of UTI  Assessment:  - Patient with chronic hydronephrosis and L UPJ stent s/p stent revision five days before scheduled stent revision for symptoms of UTI  Doing well and stable for discharge  Plan:   - Follow up with Urology outpatient        Recurrent UTI  Assessment & Plan  Patient presents with foggy urine, lower abdominal pressure, dysuria, increased urinary frequency, chills, foul smelling urine x 1 day and a history of chronic UTIs  Patient is s/p L UPJ placement in 10/21/21 and s/p stent revision 2/25/22 with next revision scheduled for 8/26/22  Physical exam reveals lower abdominal tenderness to palpation but no CVA tenderness  Afebrile on admission with no leukocytosis  Urine cultures positive for E coli  Patient received 3 doses IV ceftriaxone while inpatient  Repeat urine cultures pending  Assessment:   - Complicated UTI in postmenopausal female w/ L UPJ stent but currently not showing signs or symptoms of urosepsis or pyelonephritis s/p stent revision and 3 doses IV ceftriaxone is stable for discharge to home       Plan:   - Keflex 500mg q6h for 3 days post discharge   - Follow up with Urology and PCP outpatient     Stage 2 chronic kidney disease  Assessment & Plan  Patient has history of chronic UTI, chronic hydronephrosis   Baseline Cr level approx 0 7  eGFR on admission 78  Discharge: Cr 0 62, eGFR 89  Renal currently function at patient's baseline  No significant rise in Cr from baseline       Assessment:   - Patient w/ stable stage II CKD at baseline presents w/ concern for UTI and hydroureter s/p UPJ stent, but without concern for CHANDNI during admission or at time of discharge  Plan:   - Avoid nephrotoxic medications - use tylenol for pain  - Follow up with PCP outpatient    H/O small bowel obstruction  Assessment & Plan  Patient has a history of small bowel obstruction and history of hysterectomy after endometrial cancer   Currently reporting good bowel movements   Plan for surgery on 8/22 which could slow down bowel movements    Received PRN oxycodone for pain, which also inhibits bowel motility   Patient passing flatus without problem and denies any abdominal pain at discharge     Assessment:   - Patient with several risk factors for small bowel obstruction, including prior abdominal surgery with possible intra-abdominal adhesions, prior history of SBO, opioid analgesics, and recent cystoscopy has not had bowel movement but is passing flatus without difficulty  Plan:   -  patient about signs/symptoms of SBO   - Follow up with PCP     Essential hypertension  Assessment & Plan  Patient has established history of hypertension at baseline  On amlodipine and metoprolol PTA  Most recent blood pressure reading: Blood Pressure: 147/71      Assessment:   - Patient well controlled on home regimen, with no concern for nephrotoxic anti-HTN medications       Plan:  - Continue PTA amlodipine and metoprolol  - Follow up with PCP       Medical Problems             Resolved Problems  Date Reviewed: 8/23/2022   None               Discharging Physician / Practitioner: Arnel Cerrato MD  PCP: Zeynep Marte DO  Admission Date:   Admission Orders (From admission, onward)     Ordered        08/21/22 1419  [address] [facility] [address] [place]  Once                      Discharge Date: 08/23/22    Consultations During Hospital Stay:  · Urology    Procedures Performed:   · Cystoscopy Retrograde Pyelogram with Exchange Stent Ureteral (Left)     Significant Findings / Test Results:   · CT Abdomen/Pelvis without IV Contrast - 8/21/2022  · "Massively dilated left renal collecting system despite the presence of the nephroureteral stent  This would suggest stent malfunction  Gallstones  Persistent interstitial opacities in the lung bases of uncertain significance "     Incidental Findings:   · None    Test Results Pending at Discharge (will require follow up): · Repeat urine culture collected 8/22/22 pending     Outpatient Tests Requested:  · None    Complications:  None    Reason for Admission: UTI and Hydronephrosis     Hospital Course:   Lisset Lee is a 66 y o  female patient who originally presented to the hospital on 8/21/2022 due to urinary tract infection and hydronephrosis  She has a PMH of frequent UTI and L UPJ stent that was originally placed in October 2021  Her stent requires frequent revision, and was last revised in February 2022  She was due for another revision on Friday, 8/26/22, but presented to ED w/ increased urinary frequency, foggy urine, pelvic/lower abdomina pain and pressure, chills, and bilateral back pain  UA on admission found leukocytes, protein, blood, and bacteria, and cultures were positive for E coli  Patient was started on IV ceftriaxone and urology was consulted  Urology performed stent replacement on 8/22/22 and patient tolerated the procedure well  After the surgery, patient denies any continued symptoms of UTI, and urology has cleared her for discharge with instructions to follow up outpatient  Of note, patient did refuse her IV heparin VTE prophylaxis and SCDs during admission  Please see above list of diagnoses and related plan for additional information       Condition at Discharge: good    Discharge Day Visit / Exam:   Subjective: Nursing reports no acute overnight events  Patient has continued to refuse heparin and SCDs  Patient seen and examined at bedside this morning  She was sitting comfortably in the chair and reports feeling much better  Denied any fevers, chills, nausea, vomiting, diarrhea, chest pain, SOB, CORRALES, abdominal pain, or pain/swelling in her legs  She reports that she has not had a bowel movement, however she has difficulty moving her bowels when she is not at home  Also, she reports passing flatus without issue  She feels ready to leave and is looking forward to going home today  Vitals: Blood Pressure: 147/71 (08/23/22 0748)  Pulse: 64 (08/23/22 0748)  Temperature: 98 2 °F (36 8 °C) (08/23/22 0748)  Temp Source: Temporal (08/22/22 1422)  Respirations: 18 (08/23/22 0748)  Height: 5' 4" (162 6 cm) (08/21/22 1009)  Weight - Scale: 63 5 kg (140 lb) (08/21/22 1009)  SpO2: 97 % (08/23/22 0748)     Exam:    Physical Exam  Vitals and nursing note reviewed  Constitutional:       General: She is not in acute distress  Appearance: Normal appearance  She is well-developed and normal weight  She is not ill-appearing, toxic-appearing or diaphoretic  HENT:      Head: Normocephalic and atraumatic  Mouth/Throat:      Mouth: Mucous membranes are moist    Eyes:      General: No scleral icterus  Right eye: No discharge  Left eye: No discharge  Conjunctiva/sclera: Conjunctivae normal    Cardiovascular:      Rate and Rhythm: Normal rate and regular rhythm  Heart sounds: Normal heart sounds  No murmur heard  No friction rub  No gallop  Pulmonary:      Effort: Pulmonary effort is normal  No respiratory distress  Breath sounds: Normal breath sounds  No stridor  No wheezing, rhonchi or rales  Abdominal:      General: Abdomen is flat  Bowel sounds are normal  There is no distension  Palpations: Abdomen is soft  Tenderness: There is no abdominal tenderness   There is no right CVA tenderness, left CVA tenderness, guarding or rebound  Musculoskeletal:         General: No swelling or tenderness  Cervical back: Neck supple  Right lower leg: No edema  Left lower leg: No edema  Skin:     General: Skin is warm and dry  Capillary Refill: Capillary refill takes less than 2 seconds  Coloration: Skin is not jaundiced  Findings: No erythema  Neurological:      Mental Status: She is alert  Mental status is at baseline  Motor: No weakness  Psychiatric:         Mood and Affect: Mood normal          Behavior: Behavior normal          Thought Content: Thought content normal          Judgment: Judgment normal        Discussion with Family: Updated  () at bedside  Discharge instructions/Information to patient and family:   See after visit summary for information provided to patient and family  Provisions for Follow-Up Care:  See after visit summary for information related to follow-up care and any pertinent home health orders  Disposition:   Home    Planned Readmission: No     Discharge Statement:  I spent 60 minutes discharging the patient  This time was spent on the day of discharge  I had direct contact with the patient on the day of discharge  Greater than 50% of the total time was spent examining patient, answering all patient questions, arranging and discussing plan of care with patient as well as directly providing post-discharge instructions  Additional time then spent on discharge activities  Discharge Medications:  See after visit summary for reconciled discharge medications provided to patient and/or family        **Please Note: This note may have been constructed using a voice recognition system**      Radha Ruby MD   PGY-1

## 2022-08-23 NOTE — ASSESSMENT & PLAN NOTE
Patient has established history of hypertension at baseline  On amlodipine and metoprolol PTA  Most recent blood pressure reading: Blood Pressure: 147/71      Assessment:   - Patient well controlled on home regimen, with no concern for nephrotoxic anti-HTN medications       Plan:  - Continue PTA amlodipine and metoprolol  - Follow up with PCP

## 2022-08-23 NOTE — ASSESSMENT & PLAN NOTE
Patient had L UPJ stent placed 10/21/2021 for long term hydronephrosis   Testing after placement demonstrated persistent obstruction that would require continued stent management q3-12 months  First stent revision 2/25/22  Next revision originally planned for 8/26/22 but patient presented to ED w/ symptoms of UTI  Assessment:  - Patient with chronic hydronephrosis and L UPJ stent s/p stent revision five days before scheduled stent revision for symptoms of UTI  Doing well and stable for discharge       Plan:   - Follow up with Urology outpatient

## 2022-08-23 NOTE — PLAN OF CARE
Problem: Potential for Falls  Goal: Patient will remain free of falls  Description: INTERVENTIONS:  - Educate patient/family on patient safety including physical limitations  - Instruct patient to call for assistance with activity   - Consult OT/PT to assist with strengthening/mobility   - Keep Call bell within reach  - Keep bed low and locked with side rails adjusted as appropriate  - Keep care items and personal belongings within reach  - Initiate and maintain comfort rounds  - Make Fall Risk Sign visible to staff  - Offer Toileting   - Obtain necessary fall risk management equipment  - Apply yellow socks and bracelet for high fall risk patients  - Consider moving patient to room near nurses station  Outcome: Progressing     Problem: MOBILITY - ADULT  Goal: Maintain or return to baseline ADL function  Description: INTERVENTIONS:  -  Assess patient's ability to carry out ADLs; assess patient's baseline for ADL function and identify physical deficits which impact ability to perform ADLs (bathing, care of mouth/teeth, toileting, grooming, dressing, etc )  - Assess/evaluate cause of self-care deficits   - Assess range of motion  - Assess patient's mobility; develop plan if impaired  - Assess patient's need for assistive devices and provide as appropriate  - Encourage maximum independence but intervene and supervise when necessary  - Involve family in performance of ADLs  - Assess for home care needs following discharge   - Consider OT consult to assist with ADL evaluation and planning for discharge  - Provide patient education as appropriate  Outcome: Progressing  Goal: Maintains/Returns to pre admission functional level  Description: INTERVENTIONS:  - Perform BMAT or MOVE assessment daily    - Set and communicate daily mobility goal to care team and patient/family/caregiver     - Collaborate with rehabilitation services on mobility goals if consulted  - Perform Range of Motion   - Reposition patient   - Dangle patient legs  - Stand patient   - Ambulate patient   - Out of bed to chair   - Out of bed for meals   - Out of bed for toileting  - Record patient progress and toleration of activity level   Outcome: Progressing

## 2022-08-23 NOTE — DISCHARGE INSTRUCTIONS
Urinary Tract Infection in Women   WHAT YOU NEED TO KNOW:   A urinary tract infection (UTI) is caused by bacteria that get inside your urinary tract  Most bacteria that enter your urinary tract come out when you urinate  If the bacteria stay in your urinary tract, you may get an infection  Your urinary tract includes your kidneys, ureters, bladder, and urethra  Urine is made in your kidneys, and it flows from the ureters to the bladder  Urine leaves the bladder through the urethra  A UTI is more common in your lower urinary tract, which includes your bladder and urethra  DISCHARGE INSTRUCTIONS:   Return to the emergency department if:   You are urinating very little or not at all  You have a high fever with shaking chills  You have side or back pain that gets worse  Call your doctor if:   You have a fever  You do not feel better after 2 days of taking antibiotics  You are vomiting  You have questions or concerns about your condition or care  Medicines:   Antibiotics  help fight a bacterial infection  If you have UTIs often (called recurrent UTIs), you may be given antibiotics to take regularly  You will be given directions for when and how to use antibiotics  The goal is to prevent UTIs but not cause antibiotic resistance by using antibiotics too often  Medicines  may be given to decrease pain and burning when you urinate  They will also help decrease the feeling that you need to urinate often  These medicines will make your urine orange or red  Take your medicine as directed  Contact your healthcare provider if you think your medicine is not helping or if you have side effects  Tell him or her if you are allergic to any medicine  Keep a list of the medicines, vitamins, and herbs you take  Include the amounts, and when and why you take them  Bring the list or the pill bottles to follow-up visits  Carry your medicine list with you in case of an emergency      Follow up with your doctor as directed:  Write down your questions so you remember to ask them during your visits  Prevent another UTI:   Empty your bladder often  Urinate and empty your bladder as soon as you feel the need  Do not hold your urine for long periods of time  Wipe from front to back after you urinate or have a bowel movement  This will help prevent germs from getting into your urinary tract through your urethra  Drink liquids as directed  Ask how much liquid to drink each day and which liquids are best for you  You may need to drink more liquids than usual to help flush out the bacteria  Do not drink alcohol, caffeine, or citrus juices  These can irritate your bladder and increase your symptoms  Your healthcare provider may recommend cranberry juice to help prevent a UTI  Urinate after you have sex  This can help flush out bacteria passed during sex  Do not douche or use feminine deodorants  These can change the chemical balance in your vagina  Change sanitary pads or tampons often  This will help prevent germs from getting into your urinary tract  Talk to your healthcare provider about your birth control method  You may need to change your method if it is increasing your risk for UTIs  Wear cotton underwear and clothes that are loose  Tight pants and nylon underwear can trap moisture and cause bacteria to grow  Vaginal estrogen may be recommended  This medicine helps prevent UTIs in women who have gone through menopause or are in hazel-menopause  Do pelvic muscle exercises often  Pelvic muscle exercises may help you start and stop urinating  Strong pelvic muscles may help you empty your bladder easier  Squeeze these muscles tightly for 5 seconds like you are trying to hold back urine  Then relax for 5 seconds  Gradually work up to squeezing for 10 seconds  Do 3 sets of 15 repetitions a day, or as directed      © Copyright [employer] 2022 Information is for End User's use only and may not be sold, redistributed or otherwise used for commercial purposes  All illustrations and images included in CareNotes® are the copyrighted property of A D A M , Inc  or [facility] [employer]  The above information is an  only  It is not intended as medical advice for individual conditions or treatments  Talk to your doctor, nurse or pharmacist before following any medical regimen to see if it is safe and effective for you  Hydronephrosis   WHAT YOU NEED TO KNOW:   Hydronephrosis is swelling in one or both kidneys caused by urine buildup  Urine normally flows from the kidneys to the bladder through tubes called ureters  A blockage in the ureters can prevent urine from flowing properly  Urine flow may also be prevented or slowed if your kidneys do not work correctly  Urine flows back into your urinary tract  Pressure builds up in the kidney and causes swelling  DISCHARGE INSTRUCTIONS:   Follow up with your healthcare provider or specialist as directed: You may need to be referred to a gynecologist, oncologist, or urologist for more tests and treatment  Write down your questions so you remember to ask them during your visits  Contact your healthcare provider if:   Your abdomen feels full  You have a change in how much or how often you urinate  You urinate more times at night and in larger amounts than during the day  You have mild lower back pain or pain on one side when you urinate  You have questions or concerns about your condition or care  Return to the emergency department if:   You have severe, stabbing back pain  You have blood in your urine  You cannot urinate, or you urinate very little  © Copyright [address] 2022 Information is for End User's use only and may not be sold, redistributed or otherwise used for commercial purposes   All illustrations and images included in CareNotes® are the copyrighted property of A D A M , Inc  or [employer] [facility]  The above information is an  only  It is not intended as medical advice for individual conditions or treatments  Talk to your doctor, nurse or pharmacist before following any medical regimen to see if it is safe and effective for you

## 2022-08-23 NOTE — ASSESSMENT & PLAN NOTE
Patient presents with foggy urine, lower abdominal pressure, dysuria, increased urinary frequency, chills, foul smelling urine x 1 day and a history of chronic UTIs  Patient is s/p L UPJ placement in 10/21/21 and s/p stent revision 2/25/22 with next revision scheduled for 8/26/22  Physical exam reveals lower abdominal tenderness to palpation but no CVA tenderness  Afebrile on admission with no leukocytosis  Urine cultures positive for E coli  Patient received 3 doses IV ceftriaxone while inpatient  Repeat urine cultures pending  Assessment:   - Complicated UTI in postmenopausal female w/ L UPJ stent but currently not showing signs or symptoms of urosepsis or pyelonephritis s/p stent revision and 3 doses IV ceftriaxone is stable for discharge to home       Plan:   - Keflex 500mg q6h for 3 days post discharge   - Follow up with Urology and PCP outpatient

## 2022-08-24 LAB — BACTERIA UR CULT: NORMAL

## 2022-09-09 ENCOUNTER — NURSE TRIAGE (OUTPATIENT)
Dept: OTHER | Facility: OTHER | Age: 72
End: 2022-09-09

## 2022-09-09 NOTE — TELEPHONE ENCOUNTER
Spoke with patient who states she has taken Advil in the past for aches and pains  She is not sure why her AVS states to "stop Advil"  Advised patient she was most likely told to stop Advil prior to stent exchange due to it having blood thinning properties  Patient requests asking provider if it is OK to resume Advil now    Please advise

## 2022-09-09 NOTE — TELEPHONE ENCOUNTER
Patient called in to question if it is alright to resume taking Advil post her stent exchange on 8/22/22  She had to stop taking prior to the stent exchange  Patient requests to leave voicemail if she is not available on home phone  Reason for Disposition   Caller has NON-URGENT medicine question about med that PCP or specialist prescribed and triager unable to answer question    Answer Assessment - Initial Assessment Questions  1  NAME of MEDICATION: "What medicine are you calling about?"      Advil  2  QUESTION: "What is your question?" (e g , medication refill, side effect)      I had to stop taking Advil prior to my stent exchange on 8/22/22  Can I resume taking it? 3  PRESCRIBING HCP: "Who prescribed it?" Reason: if prescribed by specialist, call should be referred to that group  OTC    4  SYMPTOMS: "Do you have any symptoms?"      Not asked    5  SEVERITY: If symptoms are present, ask "Are they mild, moderate or severe?"      Not asked    6   PREGNANCY:  "Is there any chance that you are pregnant?" "When was your last menstrual period?"      Post menopausal    Protocols used: MEDICATION QUESTION CALL-ADULT-[place]

## 2022-09-09 NOTE — TELEPHONE ENCOUNTER
Regarding: Medication question  ----- Message from Janay Muñoz sent at 9/9/2022 10:39 AM EDT -----  "I was told to stop advil after my stent exchange  Can I start taking it again?"      Please call patient's home # first   No VM set up on mobile #

## 2022-09-12 NOTE — TELEPHONE ENCOUNTER
Spoke with patient and informed her per provider, there is no urological reason why she cannot take Advil  Patient verbalized understanding

## 2022-09-12 NOTE — PROGRESS NOTES
HISTORY AND PHYSICAL       Patient Identifiers: Coleen Hill (MRN: [id number])    Urology, Meenu Johnson PA-C  Date of Service: 9/12/2022    History of Present Illness:     Coleen Hill is a 66 y o  female with his chronic left UPJ obstruction following extensive discussion elected for chronic stent management  She had recent hydronephrosis and stent failure and her stent was exchanged the next day  She reports no problems since her recent stent change  Past Medical, Past Surgical History:     Past Medical History:   Diagnosis Date    Chronic kidney disease     GERD (gastroesophageal reflux disease)     Hypertension     Lyme disease    :    Past Surgical History:   Procedure Laterality Date    FL RETROGRADE PYELOGRAM  10/21/2021    FL RETROGRADE PYELOGRAM  2/25/2022    FL RETROGRADE PYELOGRAM  8/22/2022    HYSTERECTOMY      [place] CYSTOURETHROSCOPY,URETER CATHETER Bilateral 10/21/2021    Procedure: CYSTOSCOPY RETROGRADE PYELOGRAM WITH INSERTION STENT URETERAL;  Surgeon: Stoney Rodriguez MD;  [facility];  Service: Urology    [place] CYSTOURETHROSCOPY,URETER CATHETER Left 2/25/2022    Procedure: CYSTOSCOPY RETROGRADE PYELOGRAM WITH INSERTION STENT URETERAL; DIAGNOSTIC URETEROSCOPY;  Surgeon: Stoney Rodriguez MD;  [facility];  Service: Urology    [place] CYSTOURETHROSCOPY,URETER CATHETER Left 8/22/2022    Procedure: CYSTOSCOPY RETROGRADE PYELOGRAM WITH EXCHANGE STENT URETERAL;  Surgeon: Sushil Sousa MD;  [facility];  Service: Urology   :    Medications, Allergies:     Current Outpatient Medications:     acetaminophen (TYLENOL) 325 mg tablet, Take 2 tablets (650 mg total) by mouth every 8 (eight) hours as needed for mild pain, headaches or fever, Disp: 14 tablet, Rfl: 0    metoprolol succinate (TOPROL-XL) 25 mg 24 hr tablet, Take 25 mg by mouth 2 (two) times a day , Disp: , Rfl:     Allergies:   Allergies   Allergen Reactions    Bactrim [Sulfamethoxazole-Trimethoprim] Other (See Comments)     Her mom  from [address], Family histor?  Cefixime Other (See Comments)     30 years ago, unclear reaction  Believes allergy listed due to C Dif  Tolerated Ceftriaxone 10/21    Sulfa Antibiotics Other (See Comments)     Causes sores on skin - per pt   :    Social and Family History:   Social History:   Social History     Tobacco Use    Smoking status: Never Smoker    Smokeless tobacco: Never Used   Vaping Use    Vaping Use: Never used   Substance Use Topics    Alcohol use: Yes     Comment: socially    Drug use: No        Social History     Tobacco Use   Smoking Status Never Smoker   Smokeless Tobacco Never Used       Family History:  Family History   Problem Relation Age of Onset    Stroke Father    :     Review of Systems:     General: Fever, chills, or night sweats: positive  Cardiac: Negative for chest pain  Pulmonary: Negative for shortness of breath  Gastrointestinal: Abdominal pain negative  Nausea, vomiting, or diarrhea negative,  Genitourinary: See HPI above  Patient does not have hematuria  All other systems queried were negative  Physical Exam:   General: Patient is pleasant and in NAD  Awake and alert  There were no vitals taken for this visit  Cardiac: regular rate  Peripheral edema: negative  Pulmonary: Non-labored breathing lungs clear bilaterally  Abdomen: Soft, non-tender, non-distended  No surgical scars  No masses, tenderness, hernias noted  Genitourinary: Negative CVA tenderness, negative suprapubic tenderness        Labs:     Lab Results   Component Value Date    HGB 12 6 2022    HCT 37 6 2022    WBC 4 37 2022     2022   ]    Lab Results   Component Value Date    K 4 4 2022     2022    CO2 26 2022    BUN 14 2022    CREATININE 0 62 2022    CALCIUM 9 1 2022   ]    Imaging:   I personally reviewed the images and report of the following studies, and reviewed them with the patient:  CT ABDOMEN AND PELVIS WITHOUT IV CONTRAST - LOW DOSE RENAL STONE   IMPRESSION:     Massively dilated left renal collecting system despite the presence of the nephroureteral stent  This would suggest stent malfunction  Gallstones     Persistent interstitial opacities in the lung bases of uncertain significance      ASSESSMENT:     1   Chronic left UPJ obstruction with hydronephrosis    PLAN:   -cysto retrograde pyelogram and left stent change as scheduled  -urine culture to be done prior to appointment    Fariba Su PA-C

## 2022-09-13 ENCOUNTER — OFFICE VISIT (OUTPATIENT)
Dept: UROLOGY | Facility: CLINIC | Age: 72
End: 2022-09-13
Payer: MEDICARE

## 2022-09-13 VITALS
SYSTOLIC BLOOD PRESSURE: 128 MMHG | HEIGHT: 64 IN | WEIGHT: 146 LBS | DIASTOLIC BLOOD PRESSURE: 70 MMHG | BODY MASS INDEX: 24.92 KG/M2

## 2022-09-13 DIAGNOSIS — N13.30 HYDRONEPHROSIS, UNSPECIFIED HYDRONEPHROSIS TYPE: Primary | ICD-10-CM

## 2022-09-13 DIAGNOSIS — N39.0 URINARY TRACT INFECTION WITHOUT HEMATURIA, SITE UNSPECIFIED: ICD-10-CM

## 2022-09-13 PROCEDURE — 99213 OFFICE O/P EST LOW 20 MIN: CPT | Performed by: PHYSICIAN ASSISTANT

## 2022-09-13 RX ORDER — AMLODIPINE BESYLATE 5 MG/1
5 TABLET ORAL 2 TIMES DAILY
COMMUNITY

## 2022-10-03 ENCOUNTER — TELEPHONE (OUTPATIENT)
Dept: UROLOGY | Facility: CLINIC | Age: 72
End: 2022-10-03

## 2022-10-03 NOTE — TELEPHONE ENCOUNTER
Patient of Dr Jhony Tapia with chronic left stent  Last stent exchange was done 8/22/22 by Dr Natalie Brand  Office visit done 9/13/22  Next stent exchange plan not in place and no case requested  Please advise when her next stent exchange should be and if plan for metallic stent as previously mentioned

## 2022-10-06 NOTE — TELEPHONE ENCOUNTER
I called pt to discuss scheduling her next stent exchange with Dr Laurita Metz at the [facility] on 11/11/2022  There was no answer so I did leave a voicemail asking pt to call our office back to schedule this procedure

## 2022-10-10 NOTE — TELEPHONE ENCOUNTER
I called pt again to discuss scheduling her stent exchange with Dr Amberly Ruano  There was no answer so I did leave another voicemail asking pt to call our office back to discuss

## 2022-10-11 NOTE — TELEPHONE ENCOUNTER
Called and left message for patient to give office a call back to schedule stent exchange  Holding 11/11/22 at [facility]

## 2022-10-21 NOTE — TELEPHONE ENCOUNTER
Pt returned my call and I was able to speak with her  Pt apologized for not returning my call sooner but she stated that she has been trying to make sure her mind if she still wanted to proceed with having these stent exchanges done  I did inform pt that per Dr Mark Stafford pt is due to have this done in November and pt was not happy about this and did not understand why it is now being scheduled for every 3 months instead of 6 months as it was previously  I informed pt that I will have to confirm that with Dr Mark Stafford and then get back to her  Pt verbalized understanding and will wait to hear back form me with updated information

## 2022-10-21 NOTE — TELEPHONE ENCOUNTER
I called pt again this afternoon to discuss scheduling her next stent exchange with Dr Laurita Metz  There was no answer again so I did leave another voicemail asking pt to call our office back to discuss  I then called Shannon Price to see if she was able to get a hold of pt  Or if she could schedule this procedure for pt  Shannon Price stated that she will call pt and have her call me back to discuss

## 2022-11-07 ENCOUNTER — PREP FOR PROCEDURE (OUTPATIENT)
Dept: UROLOGY | Facility: AMBULATORY SURGERY CENTER | Age: 72
End: 2022-11-07

## 2022-11-07 DIAGNOSIS — R39.89 SUSPECTED UTI: ICD-10-CM

## 2022-11-07 DIAGNOSIS — Z01.810 PREOP CARDIOVASCULAR EXAM: ICD-10-CM

## 2022-11-07 DIAGNOSIS — N13.30 HYDRONEPHROSIS, UNSPECIFIED HYDRONEPHROSIS TYPE: Primary | ICD-10-CM

## 2022-11-20 ENCOUNTER — HOSPITAL ENCOUNTER (INPATIENT)
Facility: HOSPITAL | Age: 72
LOS: 3 days | Discharge: HOME | End: 2022-11-23
Attending: EMERGENCY MEDICINE | Admitting: INTERNAL MEDICINE

## 2022-11-20 ENCOUNTER — APPOINTMENT (EMERGENCY)
Dept: CT IMAGING | Facility: HOSPITAL | Age: 72
End: 2022-11-20

## 2022-11-20 ENCOUNTER — APPOINTMENT (EMERGENCY)
Dept: ULTRASOUND IMAGING | Facility: HOSPITAL | Age: 72
End: 2022-11-20

## 2022-11-20 DIAGNOSIS — Q62.11 HYDRONEPHROSIS WITH URETEROPELVIC JUNCTION (UPJ) OBSTRUCTION: Primary | ICD-10-CM

## 2022-11-20 DIAGNOSIS — R31.9 HEMATURIA: ICD-10-CM

## 2022-11-20 DIAGNOSIS — Z96.0 URETERAL STENT PRESENT: ICD-10-CM

## 2022-11-20 DIAGNOSIS — R74.8 ELEVATED LIVER ENZYMES: ICD-10-CM

## 2022-11-20 DIAGNOSIS — N30.00 ACUTE CYSTITIS WITHOUT HEMATURIA: ICD-10-CM

## 2022-11-20 DIAGNOSIS — R82.998 URINE WBC INCREASED: ICD-10-CM

## 2022-11-20 DIAGNOSIS — R74.01 TRANSAMINITIS: ICD-10-CM

## 2022-11-20 DIAGNOSIS — R10.9 ABDOMINAL PAIN: ICD-10-CM

## 2022-11-20 DIAGNOSIS — K80.50 CHOLEDOCHOLITHIASIS: ICD-10-CM

## 2022-11-20 LAB
ALBUMIN SERPL BCP-MCNC: 4.1 G/DL (ref 3.5–5)
ALP SERPL-CCNC: 250 U/L (ref 34–104)
ALT SERPL W P-5'-P-CCNC: 231 U/L (ref 7–52)
ANION GAP SERPL CALCULATED.3IONS-SCNC: 9 MMOL/L (ref 4–13)
APTT PPP: 27 SECONDS (ref 23–37)
AST SERPL W P-5'-P-CCNC: 320 U/L (ref 13–39)
BACTERIA UR QL AUTO: ABNORMAL /HPF
BASOPHILS # BLD AUTO: 0.01 THOUSANDS/ÂΜL (ref 0–0.1)
BASOPHILS NFR BLD AUTO: 0 % (ref 0–1)
BILIRUB DIRECT SERPL-MCNC: 1.67 MG/DL (ref 0–0.2)
BILIRUB SERPL-MCNC: 5.37 MG/DL (ref 0.2–1)
BILIRUB UR QL STRIP: NEGATIVE
BUN SERPL-MCNC: 13 MG/DL (ref 5–25)
CALCIUM SERPL-MCNC: 9.4 MG/DL (ref 8.4–10.2)
CHLORIDE SERPL-SCNC: 104 MMOL/L (ref 96–108)
CLARITY UR: ABNORMAL
CO2 SERPL-SCNC: 23 MMOL/L (ref 21–32)
COLOR UR: ABNORMAL
CREAT SERPL-MCNC: 0.88 MG/DL (ref 0.6–1.3)
EOSINOPHIL # BLD AUTO: 0.01 THOUSAND/ÂΜL (ref 0–0.61)
EOSINOPHIL NFR BLD AUTO: 0 % (ref 0–6)
ERYTHROCYTE [DISTWIDTH] IN BLOOD BY AUTOMATED COUNT: 12.9 % (ref 11.6–15.1)
FLUAV RNA RESP QL NAA+PROBE: NEGATIVE
FLUBV RNA RESP QL NAA+PROBE: NEGATIVE
GFR SERPL CREATININE-BSD FRML MDRD: 65 ML/MIN/1.73SQ M
GGT SERPL-CCNC: 112 U/L (ref 5–85)
GLUCOSE SERPL-MCNC: 154 MG/DL (ref 65–140)
GLUCOSE UR STRIP-MCNC: NEGATIVE MG/DL
HCT VFR BLD AUTO: 41.2 % (ref 34.8–46.1)
HGB BLD-MCNC: 13.2 G/DL (ref 11.5–15.4)
HGB UR QL STRIP.AUTO: ABNORMAL
IMM GRANULOCYTES # BLD AUTO: 0.03 THOUSAND/UL (ref 0–0.2)
IMM GRANULOCYTES NFR BLD AUTO: 0 % (ref 0–2)
INR PPP: 1.07 (ref 0.84–1.19)
KETONES UR STRIP-MCNC: NEGATIVE MG/DL
LACTATE SERPL-SCNC: 1.2 MMOL/L (ref 0.5–2)
LEUKOCYTE ESTERASE UR QL STRIP: ABNORMAL
LIPASE SERPL-CCNC: 15 U/L (ref 11–82)
LYMPHOCYTES # BLD AUTO: 0.61 THOUSANDS/ÂΜL (ref 0.6–4.47)
LYMPHOCYTES NFR BLD AUTO: 9 % (ref 14–44)
MCH RBC QN AUTO: 31.4 PG (ref 26.8–34.3)
MCHC RBC AUTO-ENTMCNC: 32 G/DL (ref 31.4–37.4)
MCV RBC AUTO: 98 FL (ref 82–98)
MONOCYTES # BLD AUTO: 0.71 THOUSAND/ÂΜL (ref 0.17–1.22)
MONOCYTES NFR BLD AUTO: 10 % (ref 4–12)
NEUTROPHILS # BLD AUTO: 5.77 THOUSANDS/ÂΜL (ref 1.85–7.62)
NEUTS SEG NFR BLD AUTO: 81 % (ref 43–75)
NITRITE UR QL STRIP: POSITIVE
NON-SQ EPI CELLS URNS QL MICRO: ABNORMAL /HPF
NRBC BLD AUTO-RTO: 0 /100 WBCS
PH UR STRIP.AUTO: 6 [PH]
PLATELET # BLD AUTO: 167 THOUSANDS/UL (ref 149–390)
PLATELET # BLD AUTO: 203 THOUSANDS/UL (ref 149–390)
PMV BLD AUTO: 10 FL (ref 8.9–12.7)
PMV BLD AUTO: 10.1 FL (ref 8.9–12.7)
POTASSIUM SERPL-SCNC: 3.8 MMOL/L (ref 3.5–5.3)
PROT SERPL-MCNC: 7.2 G/DL (ref 6.4–8.4)
PROT UR STRIP-MCNC: ABNORMAL MG/DL
PROTHROMBIN TIME: 14.1 SECONDS (ref 11.6–14.5)
RBC # BLD AUTO: 4.2 MILLION/UL (ref 3.81–5.12)
RBC #/AREA URNS AUTO: ABNORMAL /HPF
RSV RNA RESP QL NAA+PROBE: NEGATIVE
SARS-COV-2 RNA RESP QL NAA+PROBE: NEGATIVE
SODIUM SERPL-SCNC: 136 MMOL/L (ref 135–147)
SP GR UR STRIP.AUTO: 1.01 (ref 1–1.03)
UROBILINOGEN UR STRIP-ACNC: <2 MG/DL
WBC # BLD AUTO: 7.14 THOUSAND/UL (ref 4.31–10.16)
WBC #/AREA URNS AUTO: ABNORMAL /HPF
WBC CLUMPS # UR AUTO: PRESENT /UL

## 2022-11-20 RX ORDER — ENOXAPARIN SODIUM 100 MG/ML
40 INJECTION SUBCUTANEOUS
Status: DISCONTINUED | OUTPATIENT
Start: 2022-11-21 | End: 2022-11-23 | Stop reason: HOSPADM

## 2022-11-20 RX ORDER — OXYCODONE HYDROCHLORIDE 5 MG/1
5 TABLET ORAL EVERY 4 HOURS PRN
Status: CANCELLED | OUTPATIENT
Start: 2022-11-20

## 2022-11-20 RX ORDER — OXYCODONE HYDROCHLORIDE 5 MG/1
5 TABLET ORAL EVERY 4 HOURS PRN
Status: DISCONTINUED | OUTPATIENT
Start: 2022-11-20 | End: 2022-11-23 | Stop reason: HOSPADM

## 2022-11-20 RX ORDER — SODIUM CHLORIDE 9 MG/ML
75 INJECTION, SOLUTION INTRAVENOUS CONTINUOUS
Status: DISCONTINUED | OUTPATIENT
Start: 2022-11-20 | End: 2022-11-23

## 2022-11-20 RX ORDER — METOPROLOL SUCCINATE 25 MG/1
25 TABLET, EXTENDED RELEASE ORAL 2 TIMES DAILY
Status: DISCONTINUED | OUTPATIENT
Start: 2022-11-20 | End: 2022-11-23 | Stop reason: HOSPADM

## 2022-11-20 RX ORDER — OXYCODONE HYDROCHLORIDE 5 MG/1
2.5 TABLET ORAL EVERY 4 HOURS PRN
Status: DISCONTINUED | OUTPATIENT
Start: 2022-11-20 | End: 2022-11-23 | Stop reason: HOSPADM

## 2022-11-20 RX ORDER — CEFTRIAXONE 1 G/50ML
1000 INJECTION, SOLUTION INTRAVENOUS EVERY 24 HOURS
Status: DISCONTINUED | OUTPATIENT
Start: 2022-11-21 | End: 2022-11-23

## 2022-11-20 RX ORDER — ENOXAPARIN SODIUM 100 MG/ML
40 INJECTION SUBCUTANEOUS DAILY
Status: DISCONTINUED | OUTPATIENT
Start: 2022-11-21 | End: 2022-11-20

## 2022-11-20 RX ORDER — ENOXAPARIN SODIUM 100 MG/ML
40 INJECTION SUBCUTANEOUS EVERY 12 HOURS SCHEDULED
Status: DISCONTINUED | OUTPATIENT
Start: 2022-11-21 | End: 2022-11-20

## 2022-11-20 RX ORDER — CEFTRIAXONE 1 G/50ML
1000 INJECTION, SOLUTION INTRAVENOUS ONCE
Status: COMPLETED | OUTPATIENT
Start: 2022-11-20 | End: 2022-11-20

## 2022-11-20 RX ORDER — ACETAMINOPHEN 325 MG/1
650 TABLET ORAL ONCE
Status: COMPLETED | OUTPATIENT
Start: 2022-11-20 | End: 2022-11-20

## 2022-11-20 RX ORDER — ONDANSETRON 2 MG/ML
4 INJECTION INTRAMUSCULAR; INTRAVENOUS ONCE
Status: COMPLETED | OUTPATIENT
Start: 2022-11-20 | End: 2022-11-20

## 2022-11-20 RX ORDER — AMLODIPINE BESYLATE 5 MG/1
5 TABLET ORAL 2 TIMES DAILY
Status: DISCONTINUED | OUTPATIENT
Start: 2022-11-20 | End: 2022-11-23 | Stop reason: HOSPADM

## 2022-11-20 RX ADMIN — AMLODIPINE BESYLATE 5 MG: 5 TABLET ORAL at 20:50

## 2022-11-20 RX ADMIN — ONDANSETRON 4 MG: 2 INJECTION INTRAMUSCULAR; INTRAVENOUS at 15:41

## 2022-11-20 RX ADMIN — CEFTRIAXONE 1000 MG: 1 INJECTION, SOLUTION INTRAVENOUS at 15:07

## 2022-11-20 RX ADMIN — ACETAMINOPHEN 650 MG: 325 TABLET ORAL at 14:29

## 2022-11-20 RX ADMIN — SODIUM CHLORIDE 500 ML: 0.9 INJECTION, SOLUTION INTRAVENOUS at 14:41

## 2022-11-20 RX ADMIN — METOPROLOL SUCCINATE 25 MG: 25 TABLET, EXTENDED RELEASE ORAL at 20:50

## 2022-11-20 RX ADMIN — MORPHINE SULFATE 2 MG: 2 INJECTION, SOLUTION INTRAMUSCULAR; INTRAVENOUS at 15:41

## 2022-11-20 RX ADMIN — SODIUM CHLORIDE 75 ML/HR: 0.9 INJECTION, SOLUTION INTRAVENOUS at 20:49

## 2022-11-20 NOTE — CONSULTS
Consultation - General Surgery   Kacy Go 66 y o  female MRN: [id number]  Unit/Bed#: ED-41 Encounter: [id number]    Assessment/Plan     Assessment:  Elevated LFTs (transaminitis, hyperbilirubinemia) -- unclear etiology  Cholelithiasis without evidence of cholecystitis or choledocholithiasis  Recurrent UTI i/s/o chronic L ureteral stent    Plan:  · Recommend medicine admission for UTI  · Recommend GI consult for workup of elevated LFTs  · Check direct bilirubin, hepatitis panel  · Patient hesitant to undergo MRCP  · Trend LFTs -- if worsening, may require MRCP (vs ERCP, if unwilling to have MRI)  · Will defer antibiotic regimen to medicine -- no indication for abx from gallbladder perspective (low suspicion for cholecystitis or cholangitis)  · Okay for clear liquid diet, NPO after midnight    History of Present Illness     HPI:  Kacy Go is a 66 y o  female who presents with suprapubic pressure and cloudy urine  She has a h/o recurrent UTIs i/s/o L ureteral stent (placed 2021), as well as recurrent adhesive small bowel obstructions  She states her symptoms feel very similar to her past UTIs  She does endorse occasional postprandial epigastric/periumbilical pain, although these episodes are sporadic and she has not had one recently  She does endorse some nausea without vomiting as well as chills prior to arrival  [facility]up in [facility] demonstrated cholelithiasis with otherwise normal-appearing gallbladder and normal CBD (6 mm), as well as positive urinalysis but without bacteria seen on microscopy  Her LFTs were elevated (, , alk phos 250, total bilirubin 5 37) from a previously normal baseline  Consults    Review of Systems   All other systems reviewed and are negative  Positive systems as stated in HPI      Historical Information   Past Medical History:   Diagnosis Date   • Chronic kidney disease    • GERD (gastroesophageal reflux disease)    • Hypertension    • Lyme disease      Past Surgical History:   Procedure Laterality Date   • FL RETROGRADE PYELOGRAM  10/21/2021   • FL RETROGRADE PYELOGRAM  2022   • FL RETROGRADE PYELOGRAM  2022   • HYSTERECTOMY     • CT CYSTOURETHROSCOPY,URETER CATHETER Bilateral 10/21/2021    Procedure: CYSTOSCOPY RETROGRADE PYELOGRAM WITH INSERTION STENT URETERAL;  Surgeon: Juany August MD;  [facility];  Service: Urology   • CT CYSTOURETHROSCOPY,URETER CATHETER Left 2022    Procedure: CYSTOSCOPY RETROGRADE PYELOGRAM WITH INSERTION STENT URETERAL; DIAGNOSTIC URETEROSCOPY;  Surgeon: Juany August MD;  [facility];  Service: Urology   • CT CYSTOURETHROSCOPY,URETER CATHETER Left 2022    Procedure: CYSTOSCOPY RETROGRADE PYELOGRAM WITH EXCHANGE STENT URETERAL;  Surgeon: Uma Still MD;  [facility];  Service: Urology     Social History   Social History     Substance and Sexual Activity   Alcohol Use Yes    Comment: socially     Social History     Substance and Sexual Activity   Drug Use No     E-Cigarette/Vaping   • E-Cigarette Use Never User      E-Cigarette/Vaping Substances     Social History     Tobacco Use   Smoking Status Never   Smokeless Tobacco Never     Family History:   Family History   Problem Relation Age of Onset   • Stroke Father        Meds/Allergies   all current active meds have been reviewed  Allergies   Allergen Reactions   • Bactrim [Sulfamethoxazole-Trimethoprim] Other (See Comments)     Her mom  from [address], Family histor? • Cefixime Other (See Comments)     30 years ago, unclear reaction   Believes allergy listed due to C Dif  Tolerated Ceftriaxone 10/21   • Sulfa Antibiotics Other (See Comments)     Causes sores on skin - per pt       Objective   First Vitals:   Blood Pressure: 164/70 (22 1406)  Pulse: 74 (22 1405)  Temperature: 99 5 °F (37 5 °C) (22 1405)  Temp Source: Oral (22 1405)  Respirations: 18 (22 1405)  SpO2: 96 % (22 1405)    Current Vitals:   Blood Pressure: 111/54 (11/20/22 1800)  Pulse: 64 (11/20/22 1800)  Temperature: 99 5 °F (37 5 °C) (11/20/22 1405)  Temp Source: Oral (11/20/22 1405)  Respirations: 18 (11/20/22 1800)  SpO2: 95 % (11/20/22 1800)      Intake/Output Summary (Last 24 hours) at 11/20/2022 1827  Last data filed at 11/20/2022 1606  Gross per 24 hour   Intake 550 ml   Output --   Net 550 ml       Invasive Devices     Peripheral Intravenous Line  Duration           Peripheral IV 11/20/22 Left;Ventral (anterior) Forearm <1 day          Drain  Duration           Ureteral Internal Stent Left ureter 6 Fr  90 days                Physical Exam   Gen:  NAD  HENT: MMM  CV:  RRR  Lungs: nl effort on RA  Abd:  soft, non-distended, mild suprapubic tenderness, no RUQ tenderness  And minimal epigastric tenderness to deep palpation  Ext:  no jaundice  Skin:  no rashes, well-healed lower midline laparotomy scar  Neuro: A&Ox3     Lab Results:   I have personally reviewed pertinent lab results    , CBC:   Lab Results   Component Value Date    WBC 7 14 11/20/2022    HGB 13 2 11/20/2022    HCT 41 2 11/20/2022    MCV 98 11/20/2022     11/20/2022    MCH 31 4 11/20/2022    MCHC 32 0 11/20/2022    RDW 12 9 11/20/2022    MPV 10 1 11/20/2022    NRBC 0 11/20/2022   , CMP:   Lab Results   Component Value Date    SODIUM 136 11/20/2022    K 3 8 11/20/2022     11/20/2022    CO2 23 11/20/2022    BUN 13 11/20/2022    CREATININE 0 88 11/20/2022    CALCIUM 9 4 11/20/2022     (H) 11/20/2022     (H) 11/20/2022    ALKPHOS 250 (H) 11/20/2022    EGFR 65 11/20/2022   , Urinalysis:   Lab Results   Component Value Date    COLORU [facility] 11/20/2022    CLARITYU Extra Turbid 11/20/2022    SPECGRAV 1 011 11/20/2022    PHUR 6 0 11/20/2022    LEUKOCYTESUR Large (A) 11/20/2022    NITRITE Positive (A) 11/20/2022    GLUCOSEU Negative 11/20/2022    KETONESU Negative 11/20/2022    BILIRUBINUR Negative 11/20/2022    BLOODU Large (A) 11/20/2022 Imaging: I have personally reviewed pertinent reports  and I have personally reviewed pertinent films in PACS  EKG, Pathology, and Other Studies: I have personally reviewed pertinent reports

## 2022-11-20 NOTE — ED PROVIDER NOTES
History  Chief Complaint   Patient presents with   • Possible UTI     Pt reports she believes she has a UTI, painful urination, cloudy urine, has a stent placed in kidney; onset of symptoms Friday night worsening     Patient is a 75-year-old female who presents to the emergency department expressing concern for possible UTI  She began appreciating fogginess in her urine on Friday (2 days ago) along with suprapubic pressure  She increased water consumption and noticed some increased clarity in urine yesterday but return of fogginess today  Suprapubic pressure additionally has intensified  Today she has appreciated chills, slight nausea and decreased appetite  No vomiting, diarrhea or rash  She has had some mild back discomfort though attributes this to muscular etiology-chronic  She has not appreciated any flank discomfort  She has had some mild leg cramps-intermittent, recurrent  No leg swelling  Past medical history significant for hypertension, CKD and left UPJ stone  She has had left ureteral stent in place since 2021  Most recent exchange was in August   Exchange in November was initially recommended  Patient preferred a January date and this is arranged for January 13th  Her urologist is Dr Tylor Smith  Allergies include Bactrim, sulfa medications  Cefixime is listed although reaction is questionably C diff many years ago  Patient has recently tolerated cephalexin and ceftriaxone  Most recent antibiotic use was in August           Prior to Admission Medications   Prescriptions Last Dose Informant Patient Reported? Taking?    amLODIPine (NORVASC) 5 mg tablet   Yes No   Sig: Take 5 mg by mouth 2 (two) times a day   metoprolol succinate (TOPROL-XL) 25 mg 24 hr tablet   Yes No   Sig: Take 25 mg by mouth 2 (two) times a day       Facility-Administered Medications: None       Past Medical History:   Diagnosis Date   • Chronic kidney disease    • GERD (gastroesophageal reflux disease)    • Hypertension    • Lyme disease        Past Surgical History:   Procedure Laterality Date   • FL RETROGRADE PYELOGRAM  10/21/2021   • FL RETROGRADE PYELOGRAM  2/25/2022   • FL RETROGRADE PYELOGRAM  8/22/2022   • HYSTERECTOMY     • NC CYSTOURETHROSCOPY,URETER CATHETER Bilateral 10/21/2021    Procedure: CYSTOSCOPY RETROGRADE PYELOGRAM WITH INSERTION STENT URETERAL;  Surgeon: Ponce Cheek MD;  Location: [facility];  Service: Urology   • NC CYSTOURETHROSCOPY,URETER CATHETER Left 2/25/2022    Procedure: CYSTOSCOPY RETROGRADE PYELOGRAM WITH INSERTION STENT URETERAL; DIAGNOSTIC URETEROSCOPY;  Surgeon: Ponce Cheek MD;  [facility];  Service: Urology   • NC CYSTOURETHROSCOPY,URETER CATHETER Left 8/22/2022    Procedure: CYSTOSCOPY RETROGRADE PYELOGRAM WITH EXCHANGE STENT URETERAL;  Surgeon: Charbel Mak MD;  Location[facility];  Service: Urology       Family History   Problem Relation Age of Onset   • Stroke Father      I have reviewed and agree with the history as documented  E-Cigarette/Vaping   • E-Cigarette Use Never User      E-Cigarette/Vaping Substances     Social History     Tobacco Use   • Smoking status: Never   • Smokeless tobacco: Never   Vaping Use   • Vaping Use: Never used   Substance Use Topics   • Alcohol use: Yes     Comment: socially   • Drug use: No       Review of Systems   Constitutional: Positive for appetite change  Negative for fever  HENT: Negative for ear pain and sore throat  Respiratory: Negative for cough and shortness of breath  Cardiovascular: Negative for chest pain  Gastrointestinal: Negative for constipation and diarrhea  Genitourinary: Negative for dysuria  Skin: Negative for rash  All other systems reviewed and are negative  Physical Exam  Physical Exam  Vitals and nursing note reviewed  Constitutional:       General: She is not in acute distress  Appearance: Normal appearance  She is not ill-appearing     HENT: Head: Normocephalic  Mouth/Throat:      Mouth: Mucous membranes are moist    Eyes:      Extraocular Movements: Extraocular movements intact  Conjunctiva/sclera: Conjunctivae normal    Cardiovascular:      Rate and Rhythm: Normal rate and regular rhythm  Heart sounds: Normal heart sounds  Pulmonary:      Effort: Pulmonary effort is normal       Breath sounds: Normal breath sounds  Abdominal:      General: Bowel sounds are normal       Tenderness: There is abdominal tenderness (mild epigastric (somewhat surprising to pt ))  There is no right CVA tenderness or left CVA tenderness  Musculoskeletal:         General: Normal range of motion  Cervical back: Neck supple  Lymphadenopathy:      Cervical: No cervical adenopathy  Skin:     General: Skin is warm and dry  Neurological:      General: No focal deficit present  Mental Status: She is alert and oriented to person, place, and time     Psychiatric:         Mood and Affect: Mood normal          Behavior: Behavior normal          Vital Signs  ED Triage Vitals   Temperature Pulse Respirations Blood Pressure SpO2   11/20/22 1405 11/20/22 1405 11/20/22 1405 11/20/22 1406 11/20/22 1405   99 5 °F (37 5 °C) 74 18 164/70 96 %      Temp Source Heart Rate Source Patient Position - Orthostatic VS BP Location FiO2 (%)   11/20/22 1405 11/20/22 1405 11/20/22 1406 11/20/22 1600 --   Oral Monitor Sitting Right arm       Pain Score       11/20/22 1541       8           Vitals:    11/21/22 1730 11/21/22 1758 11/21/22 1824 11/21/22 1952   BP: 114/55 129/65 132/65 144/66   Pulse: 72 74 71 75   Patient Position - Orthostatic VS:  Lying           Visual Acuity  Visual Acuity    Flowsheet Row Most Recent Value   L Pupil Size (mm) 3   R Pupil Size (mm) 3          ED Medications  Medications   amLODIPine (NORVASC) tablet 5 mg (5 mg Oral Given 11/21/22 0930)   metoprolol succinate (TOPROL-XL) 24 hr tablet 25 mg (25 mg Oral Given 11/21/22 0930)   cefTRIAXone (ROCEPHIN) IVPB (premix in dextrose) 1,000 mg 50 mL ( Intravenous MAR Unhold 11/21/22 1830)   sodium chloride 0 9 % infusion (75 mL/hr Intravenous New Bag 11/21/22 0934)   oxyCODONE (ROXICODONE) IR tablet 2 5 mg ( Oral MAR Unhold 11/21/22 1830)   oxyCODONE (ROXICODONE) IR tablet 5 mg ( Oral MAR Unhold 11/21/22 1830)   morphine injection 2 mg ( Intravenous MAR Unhold 11/21/22 1830)   enoxaparin (LOVENOX) subcutaneous injection 40 mg ( Subcutaneous MAR Unhold 11/21/22 1830)   sodium chloride 0 9 % bolus 500 mL (0 mL Intravenous Stopped 11/20/22 1606)   acetaminophen (TYLENOL) tablet 650 mg (650 mg Oral Given 11/20/22 1429)   cefTRIAXone (ROCEPHIN) IVPB (premix in dextrose) 1,000 mg 50 mL (0 mg Intravenous Stopped 11/20/22 1606)   ondansetron (ZOFRAN) injection 4 mg (4 mg Intravenous Given 11/20/22 1541)   morphine injection 2 mg (2 mg Intravenous Given 11/20/22 1541)   LORazepam (ATIVAN) injection 2 mg (2 mg Intravenous Given 11/21/22 1956)       Diagnostic Studies  Results Reviewed     Procedure Component Value Units Date/Time    Urine culture [[id number]]  (Abnormal) Collected: 11/20/22 1419    Lab Status: Preliminary result Specimen: Urine, Clean Catch Updated: 11/21/22 1650     Urine Culture >100,000 cfu/ml Gram Negative Ced Enteric Like    Hepatitis panel, acute [[id number]]  (Normal) Collected: 11/20/22 1738    Lab Status: Final result Specimen: Blood from Arm, Left Updated: 11/21/22 1417     Hepatitis B Surface Ag Non-reactive     Hep A IgM Non-reactive     Hepatitis C Ab Non-reactive     Hep B C IgM Non-reactive    Blood culture #1 [[id number]] Collected: 11/20/22 1458    Lab Status: Preliminary result Specimen: Blood from Hand, Right Updated: 11/21/22 0001     Blood Culture Received in Microbiology Lab  Culture in Progress      Blood culture #2 [[id number]] Collected: 11/20/22 1458    Lab Status: Preliminary result Specimen: Blood from Arm, Left Updated: 11/21/22 0001     Blood Culture Received in Microbiology Lab  Culture in Progress  Bilirubin, direct [[id number]]  (Abnormal) Collected: 11/20/22 1434    Lab Status: Final result Specimen: Blood from Arm, Left Updated: 11/20/22 2052     Bilirubin, Direct 1 67 mg/dL     Lipase [[id number]]  (Normal) Collected: 11/20/22 1434    Lab Status: Final result Specimen: Blood from Arm, Left Updated: 11/20/22 1617     Lipase 15 u/L     FLU/RSV/COVID - if FLU/RSV clinically relevant [[id number]]  (Normal) Collected: 11/20/22 1440    Lab Status: Final result Specimen: Nares from Nose Updated: 11/20/22 1523     SARS-CoV-2 Negative     INFLUENZA A PCR Negative     INFLUENZA B PCR Negative     RSV PCR Negative    Narrative:      FOR PEDIATRIC PATIENTS - copy/paste COVID Guidelines URL to browser: [email or web address]    SARS-CoV-2 assay is a Nucleic Acid Amplification assay intended for the  qualitative detection of nucleic acid from SARS-CoV-2 in nasopharyngeal  swabs  Results are for the presumptive identification of SARS-CoV-2 RNA  Positive results are indicative of infection with SARS-CoV-2, the virus  causing COVID-19, but do not rule out bacterial infection or co-infection  with other viruses  Laboratories within the [place] and its  territories are required to report all positive results to the appropriate  public health authorities  Negative results do not preclude SARS-CoV-2  infection and should not be used as the sole basis for treatment or other  patient management decisions  Negative results must be combined with  clinical observations, patient history, and epidemiological information  This test has not been FDA cleared or approved  This test has been authorized by FDA under an Emergency Use Authorization  (EUA)   This test is only authorized for the duration of time the  declaration that circumstances exist justifying the authorization of the  emergency use of an in vitro diagnostic tests for detection of SARS-CoV-2  virus and/or diagnosis of COVID-19 infection under section 564(b)(1) of  the Act, 21 U  S C  024WKY-2(C)(4), unless the authorization is terminated  or revoked sooner  The test has been validated but independent review by FDA  and CLIA is pending  Test performed using [employer] GeneXpert: This RT-PCR assay targets N2,  a region unique to SARS-CoV-2  A conserved region in the E-gene was chosen  for pan-Sarbecovirus detection which includes SARS-CoV-2  According to CMS-2020-01-R, this platform meets the definition of high-[employer] technology  Lactic acid [[id number]]  (Normal) Collected: 11/20/22 1434    Lab Status: Final result Specimen: Blood from Arm, Left Updated: 11/20/22 1502     LACTIC ACID 1 2 mmol/L     Narrative:      Result may be elevated if tourniquet was used during collection      Comprehensive metabolic panel [[id number]]  (Abnormal) Collected: 11/20/22 1434    Lab Status: Final result Specimen: Blood from Arm, Left Updated: 11/20/22 1501     Sodium 136 mmol/L      Potassium 3 8 mmol/L      Chloride 104 mmol/L      CO2 23 mmol/L      ANION GAP 9 mmol/L      BUN 13 mg/dL      Creatinine 0 88 mg/dL      Glucose 154 mg/dL      Calcium 9 4 mg/dL       U/L       U/L      Alkaline Phosphatase 250 U/L      Total Protein 7 2 g/dL      Albumin 4 1 g/dL      Total Bilirubin 5 37 mg/dL      eGFR 65 ml/min/1 73sq m     Narrative:      [facility] guidelines for Chronic Kidney Disease (CKD):   •  Stage 1 with normal or high GFR (GFR > 90 mL/min/1 73 square meters)  •  Stage 2 Mild CKD (GFR = 60-89 mL/min/1 73 square meters)  •  Stage 3A Moderate CKD (GFR = 45-59 mL/min/1 73 square meters)  •  Stage 3B Moderate CKD (GFR = 30-44 mL/min/1 73 square meters)  •  Stage 4 Severe CKD (GFR = 15-29 mL/min/1 73 square meters)  •  Stage 5 End Stage CKD (GFR <15 mL/min/1 73 square meters)  Note: GFR calculation is accurate only with a steady state creatinine    Protime-INR [[id number]]  (Normal) Collected: 11/20/22 1434    Lab Status: Final result Specimen: Blood from Arm, Left Updated: 11/20/22 1458     Protime 14 1 seconds      INR 1 07    APTT [[id number]]  (Normal) Collected: 11/20/22 1434    Lab Status: Final result Specimen: Blood from Arm, Left Updated: 11/20/22 1458     PTT 27 seconds     CBC and differential [[id number]]  (Abnormal) Collected: 11/20/22 1434    Lab Status: Final result Specimen: Blood from Arm, Left Updated: 11/20/22 1445     WBC 7 14 Thousand/uL      RBC 4 20 Million/uL      Hemoglobin 13 2 g/dL      Hematocrit 41 2 %      MCV 98 fL      MCH 31 4 pg      MCHC 32 0 g/dL      RDW 12 9 %      MPV 10 1 fL      Platelets 444 Thousands/uL      nRBC 0 /100 WBCs      Neutrophils Relative 81 %      Immat GRANS % 0 %      Lymphocytes Relative 9 %      Monocytes Relative 10 %      Eosinophils Relative 0 %      Basophils Relative 0 %      Neutrophils Absolute 5 77 Thousands/µL      Immature Grans Absolute 0 03 Thousand/uL      Lymphocytes Absolute 0 61 Thousands/µL      Monocytes Absolute 0 71 Thousand/µL      Eosinophils Absolute 0 01 Thousand/µL      Basophils Absolute 0 01 Thousands/µL     Urine Microscopic [[id number]]  (Abnormal) Collected: 11/20/22 1418    Lab Status: Final result Specimen: Urine, Clean Catch Updated: 11/20/22 1430     RBC, UA 30-50 /hpf      WBC, UA Innumerable /hpf      Epithelial Cells None Seen /hpf      Bacteria, UA None Seen /hpf      WBC Clumps Present    UA (URINE) with reflex to Scope [[id number]]  (Abnormal) Collected: 11/20/22 1418    Lab Status: Final result Specimen: Urine, Clean Catch Updated: 11/20/22 1428     Color, UA Orange     Clarity, UA Extra Turbid     Specific [place], UA 1 011     pH, UA 6 0     Leukocytes, UA Large     Nitrite, UA Positive     Protein,  (2+) mg/dl      Glucose, UA Negative mg/dl      Ketones, UA Negative mg/dl      Urobilinogen, UA <2 0 mg/dl      Bilirubin, UA Negative     Occult Blood, UA Large US right upper quadrant   Final Result by Charissa Schaeffer MD (11/20 2713)   1  Cholelithiasis  Workstation performed: [id number]         CT renal stone study abdomen pelvis without contrast   Final Result by Charissa Schaeffer MD (11/20 8684)   1  Persistent markedly dilated left renal pelvis despite the presence of a left ureteral stent  This is similar in caliber to the prior CT  Correlate clinically to exclude obstructed stent  Workstation performed: [id number]         FL retrograde pyelogram    (Results Pending)   MRI abdomen wo contrast and mrcp    (Results Pending)              Procedures  Procedures       ED Course  ED Course as of 11/21/22 2038   Sun Nov 20, 2022   1529 LFTs elevated  Prior labs reviewed  In June bilirubin was mildly elevated similar to that on prior testing in the 1 to 2 range-never to the 5 range  Remainder of LFTs or unremarkable prior testing  1546 Patient updated on findings of urine testing-presence of white blood cells and red blood cells although no bacteria-unclear significance of this  LFT elevation concerning for possible biliary problem  She reports multiple family members have had problems with their gallbladders  She is not aware that she has although does admit to discomfort in the epigastrium at times after eating  She is aware that ultrasound will be performed  Zofran and morphine ordered at this time for discomfort  [address] reviewed  + cholelithiasis, no findings of cholecystitis    CT report reviewed  Contacting on call Uro NP D Sherren Kitten re: findings of hydro (similar to that seen prior to last stent exchange)  [address] [facility]  Feeling some improvement  Has appetite presently  Uro reviewed images/ labs  No definite e/o infection  Other abnormalities may be related to chronicity of stent presence  Could exchange or schedule as outpt      I remain concerned for possible biliary etiology to increased LFTs, temp elevation & epigastrci tn  ? Prolonged obstructing stone/ no longer obstructing etc   Consulting surgery for additional eval  ? T/C HIDA or other eval   Viral hepatitis panel also ordered  I advised stay in hospital for further eval in consideration of possible early bacterial infection (urinary or GI)  Pt  Hosting Thanksgiving & eager to be home but understanding of concerns & agreeable to this  [address] [facility] Surgery resident to see pt    Brent Hanna Look evaluated pt  MRCP advised for additional eval   Pt  Prefers to not undergo MRI  Will trend LFTs  Abx not specifically advised for GI/ biliary process, though will be continued for possible urinary infection  SLIM will admit  Initial Sepsis Screening     Row Name 11/20/22 5229                Is the patient's history suggestive of a new or worsening infection? Yes (Proceed)  -SZ        Suspected source of infection suspect infection, source unknown;acute abdominal infection;urinary tract infection  -SZ        Are two or more of the following signs & symptoms of infection both present and new to the patient? No  -SZ        Indicate SIRS criteria --        If the answer is yes to both questions, suspicion of sepsis is present --        If severe sepsis is present AND tissue hypoperfusion perists in the hour after fluid resuscitation or lactate > 4, the patient meets criteria for SEPTIC SHOCK --        Are any of the following organ dysfunction criteria present within 6 hours of suspected infection and SIRS criteria that are NOT considered to be chronic conditions?  --        Organ dysfunction --        Date of presentation of severe sepsis --        Time of presentation of severe sepsis --        Tissue hypoperfusion persists in the hour after crystalloid fluid administration, evidenced, by either: --        Was hypotension present within one hour of the conclusion of crystalloid fluid administration? --        Date of presentation of septic shock --        Time of presentation of septic shock --              User Key  (r) = Recorded By, (t) = Taken By, (c) = Cosigned By    [address] Name Provider Type    FAY Byers MD Physician                SBIRT 22yo+    Flowsheet Row Most Recent Value   SBIRT (23 yo +)    In order to provide better care to our patients, we are screening all of our patients for alcohol and drug use  Would it be okay to ask you these screening questions? No Filed at: 11/20/2022 1507                    MDM    Disposition  Final diagnoses:   Hematuria   Urine WBC increased   Ureteral stent present   Transaminitis   Abdominal pain     Time reflects when diagnosis was documented in both MDM as applicable and the Disposition within this note     Time User Action Codes Description Comment    11/20/2022  6:04 PM Clovia Crisp Add [Q62 11] Hydronephrosis with ureteropelvic junction (UPJ) obstruction     11/20/2022  6:42 PM Karis Patience A Add [R31 9] Hematuria     11/20/2022  6:43 PM Karis Patience A Add [R82 998] Urine WBC increased     11/20/2022  6:43 PM Karis Patience A Add [Z96 0] Ureteral stent present     11/20/2022  6:43 PM Karis Patience A Add [R74 01] Transaminitis     11/20/2022  6:43 PM Karis Patience A Add [R10 9] Abdominal pain     11/20/2022  7:53 PM Nichole Copping Add [R74 8] Elevated liver enzymes       ED Disposition     ED Disposition   Admit    Condition   Stable    Date/Time   Sun Nov 20, 2022  6:42 PM    Comment   Case was discussed with Dr Mal Marie and the patient's admission status was agreed to be Admission Status: inpatient status to the service of Dr Mal Marie              Follow-up Information    None         Current Discharge Medication List      CONTINUE these medications which have NOT CHANGED    Details   amLODIPine (NORVASC) 5 mg tablet Take 5 mg by mouth 2 (two) times a day      metoprolol succinate (TOPROL-XL) 25 mg 24 hr tablet Take 25 mg by mouth 2 (two) times a day              No discharge procedures on file      PDMP Review     None          ED Provider  Electronically Signed by           Helon Angelucci, MD  11/21/22 3734

## 2022-11-20 NOTE — SEPSIS NOTE
Sepsis Note   Xin Ventura 66 y o  female MRN: [id number]  Unit/Bed#: ED-41 Encounter: [id number]       qSOFA     Row Name 11/20/22 1630 11/20/22 1600 11/20/22 1406 11/20/22 1405       Altered mental status GCS < 15 -- -- -- --     Respiratory Rate > / =22 0 0 -- 0     Systolic BP < / =859 0 0 0 --     Q Sofa Score 0 0 0 --                Initial Sepsis Screening     Row Name 11/20/22 1658                Is the patient's history suggestive of a new or worsening infection? Yes (Proceed)  -SZ        Suspected source of infection suspect infection, source unknown;acute abdominal infection;urinary tract infection  -SZ        Are two or more of the following signs & symptoms of infection both present and new to the patient? No  -SZ        Indicate SIRS criteria --        If the answer is yes to both questions, suspicion of sepsis is present --        If severe sepsis is present AND tissue hypoperfusion perists in the hour after fluid resuscitation or lactate > 4, the patient meets criteria for SEPTIC SHOCK --        Are any of the following organ dysfunction criteria present within 6 hours of suspected infection and SIRS criteria that are NOT considered to be chronic conditions?  --        Organ dysfunction --        Date of presentation of severe sepsis --        Time of presentation of severe sepsis --        Tissue hypoperfusion persists in the hour after crystalloid fluid administration, evidenced, by either: --        Was hypotension present within one hour of the conclusion of crystalloid fluid administration? --        Date of presentation of septic shock --        Time of presentation of septic shock --              User Key  (r) = Recorded By, (t) = Taken By, (c) = Cosigned By    [address] Name Provider Type    FAY Mcfadden MD Physician

## 2022-11-21 ENCOUNTER — ANESTHESIA (INPATIENT)
Dept: PERIOP | Facility: HOSPITAL | Age: 72
End: 2022-11-21

## 2022-11-21 ENCOUNTER — APPOINTMENT (INPATIENT)
Dept: RADIOLOGY | Facility: HOSPITAL | Age: 72
End: 2022-11-21

## 2022-11-21 ENCOUNTER — TELEPHONE (OUTPATIENT)
Dept: UROLOGY | Facility: CLINIC | Age: 72
End: 2022-11-21

## 2022-11-21 ENCOUNTER — ANESTHESIA EVENT (INPATIENT)
Dept: PERIOP | Facility: HOSPITAL | Age: 72
End: 2022-11-21

## 2022-11-21 ENCOUNTER — APPOINTMENT (OUTPATIENT)
Dept: MRI IMAGING | Facility: HOSPITAL | Age: 72
End: 2022-11-21

## 2022-11-21 LAB
ALBUMIN SERPL BCP-MCNC: 3.3 G/DL (ref 3.5–5)
ALP SERPL-CCNC: 195 U/L (ref 34–104)
ALT SERPL W P-5'-P-CCNC: 130 U/L (ref 7–52)
ANION GAP SERPL CALCULATED.3IONS-SCNC: 7 MMOL/L (ref 4–13)
AST SERPL W P-5'-P-CCNC: 80 U/L (ref 13–39)
BASOPHILS # BLD AUTO: 0.01 THOUSANDS/ÂΜL (ref 0–0.1)
BASOPHILS NFR BLD AUTO: 0 % (ref 0–1)
BILIRUB SERPL-MCNC: 5.45 MG/DL (ref 0.2–1)
BUN SERPL-MCNC: 12 MG/DL (ref 5–25)
CALCIUM ALBUM COR SERPL-MCNC: 9.2 MG/DL (ref 8.3–10.1)
CALCIUM SERPL-MCNC: 8.6 MG/DL (ref 8.4–10.2)
CHLORIDE SERPL-SCNC: 109 MMOL/L (ref 96–108)
CO2 SERPL-SCNC: 23 MMOL/L (ref 21–32)
CREAT SERPL-MCNC: 0.72 MG/DL (ref 0.6–1.3)
EOSINOPHIL # BLD AUTO: 0.05 THOUSAND/ÂΜL (ref 0–0.61)
EOSINOPHIL NFR BLD AUTO: 1 % (ref 0–6)
ERYTHROCYTE [DISTWIDTH] IN BLOOD BY AUTOMATED COUNT: 13.1 % (ref 11.6–15.1)
GFR SERPL CREATININE-BSD FRML MDRD: 83 ML/MIN/1.73SQ M
GLUCOSE SERPL-MCNC: 102 MG/DL (ref 65–140)
HAV IGM SER QL: NORMAL
HBV CORE IGM SER QL: NORMAL
HBV SURFACE AG SER QL: NORMAL
HCT VFR BLD AUTO: 35.4 % (ref 34.8–46.1)
HCV AB SER QL: NORMAL
HGB BLD-MCNC: 11.2 G/DL (ref 11.5–15.4)
IMM GRANULOCYTES # BLD AUTO: 0.04 THOUSAND/UL (ref 0–0.2)
IMM GRANULOCYTES NFR BLD AUTO: 1 % (ref 0–2)
LYMPHOCYTES # BLD AUTO: 0.74 THOUSANDS/ÂΜL (ref 0.6–4.47)
LYMPHOCYTES NFR BLD AUTO: 14 % (ref 14–44)
MCH RBC QN AUTO: 32.1 PG (ref 26.8–34.3)
MCHC RBC AUTO-ENTMCNC: 31.6 G/DL (ref 31.4–37.4)
MCV RBC AUTO: 101 FL (ref 82–98)
MONOCYTES # BLD AUTO: 0.66 THOUSAND/ÂΜL (ref 0.17–1.22)
MONOCYTES NFR BLD AUTO: 13 % (ref 4–12)
NEUTROPHILS # BLD AUTO: 3.79 THOUSANDS/ÂΜL (ref 1.85–7.62)
NEUTS SEG NFR BLD AUTO: 71 % (ref 43–75)
NRBC BLD AUTO-RTO: 0 /100 WBCS
PLATELET # BLD AUTO: 167 THOUSANDS/UL (ref 149–390)
PMV BLD AUTO: 10.5 FL (ref 8.9–12.7)
POTASSIUM SERPL-SCNC: 4.1 MMOL/L (ref 3.5–5.3)
PROT SERPL-MCNC: 5.8 G/DL (ref 6.4–8.4)
RBC # BLD AUTO: 3.49 MILLION/UL (ref 3.81–5.12)
SODIUM SERPL-SCNC: 139 MMOL/L (ref 135–147)
WBC # BLD AUTO: 5.29 THOUSAND/UL (ref 4.31–10.16)

## 2022-11-21 PROCEDURE — 0T778DZ DILATION OF LEFT URETER WITH INTRALUMINAL DEVICE, VIA NATURAL OR ARTIFICIAL OPENING ENDOSCOPIC: ICD-10-PCS | Performed by: UROLOGY

## 2022-11-21 PROCEDURE — 0TP98DZ REMOVAL OF INTRALUMINAL DEVICE FROM URETER, VIA NATURAL OR ARTIFICIAL OPENING ENDOSCOPIC: ICD-10-PCS | Performed by: UROLOGY

## 2022-11-21 PROCEDURE — BT1F1ZZ FLUOROSCOPY OF LEFT KIDNEY, URETER AND BLADDER USING LOW OSMOLAR CONTRAST: ICD-10-PCS | Performed by: UROLOGY

## 2022-11-21 DEVICE — STENT URETERAL 6FR 22CM INLAY OPTIMA W/NITINOL GDWR: Type: IMPLANTABLE DEVICE | Status: FUNCTIONAL

## 2022-11-21 RX ORDER — ONDANSETRON 2 MG/ML
INJECTION INTRAMUSCULAR; INTRAVENOUS AS NEEDED
Status: DISCONTINUED | OUTPATIENT
Start: 2022-11-21 | End: 2022-11-21

## 2022-11-21 RX ORDER — ONDANSETRON 2 MG/ML
4 INJECTION INTRAMUSCULAR; INTRAVENOUS ONCE AS NEEDED
Status: DISCONTINUED | OUTPATIENT
Start: 2022-11-21 | End: 2022-11-21 | Stop reason: HOSPADM

## 2022-11-21 RX ORDER — METOCLOPRAMIDE HYDROCHLORIDE 5 MG/ML
10 INJECTION INTRAMUSCULAR; INTRAVENOUS ONCE AS NEEDED
Status: DISCONTINUED | OUTPATIENT
Start: 2022-11-21 | End: 2022-11-21 | Stop reason: HOSPADM

## 2022-11-21 RX ORDER — FENTANYL CITRATE/PF 50 MCG/ML
50 SYRINGE (ML) INJECTION
Status: DISCONTINUED | OUTPATIENT
Start: 2022-11-21 | End: 2022-11-21 | Stop reason: HOSPADM

## 2022-11-21 RX ORDER — SODIUM CHLORIDE, SODIUM LACTATE, POTASSIUM CHLORIDE, CALCIUM CHLORIDE 600; 310; 30; 20 MG/100ML; MG/100ML; MG/100ML; MG/100ML
INJECTION, SOLUTION INTRAVENOUS CONTINUOUS PRN
Status: DISCONTINUED | OUTPATIENT
Start: 2022-11-21 | End: 2022-11-21

## 2022-11-21 RX ORDER — MAGNESIUM HYDROXIDE 1200 MG/15ML
LIQUID ORAL AS NEEDED
Status: DISCONTINUED | OUTPATIENT
Start: 2022-11-21 | End: 2022-11-21 | Stop reason: HOSPADM

## 2022-11-21 RX ORDER — PROPOFOL 10 MG/ML
INJECTION, EMULSION INTRAVENOUS AS NEEDED
Status: DISCONTINUED | OUTPATIENT
Start: 2022-11-21 | End: 2022-11-21

## 2022-11-21 RX ORDER — FENTANYL CITRATE 50 UG/ML
INJECTION, SOLUTION INTRAMUSCULAR; INTRAVENOUS AS NEEDED
Status: DISCONTINUED | OUTPATIENT
Start: 2022-11-21 | End: 2022-11-21

## 2022-11-21 RX ORDER — LORAZEPAM 1 MG/1
2 TABLET ORAL ONCE
Status: DISCONTINUED | OUTPATIENT
Start: 2022-11-21 | End: 2022-11-21

## 2022-11-21 RX ORDER — LORAZEPAM 2 MG/ML
2 INJECTION INTRAMUSCULAR ONCE
Status: COMPLETED | OUTPATIENT
Start: 2022-11-21 | End: 2022-11-21

## 2022-11-21 RX ORDER — GLYCOPYRROLATE 0.2 MG/ML
INJECTION INTRAMUSCULAR; INTRAVENOUS AS NEEDED
Status: DISCONTINUED | OUTPATIENT
Start: 2022-11-21 | End: 2022-11-21

## 2022-11-21 RX ORDER — DEXAMETHASONE SODIUM PHOSPHATE 10 MG/ML
INJECTION, SOLUTION INTRAMUSCULAR; INTRAVENOUS AS NEEDED
Status: DISCONTINUED | OUTPATIENT
Start: 2022-11-21 | End: 2022-11-21

## 2022-11-21 RX ORDER — LIDOCAINE HYDROCHLORIDE 10 MG/ML
INJECTION, SOLUTION EPIDURAL; INFILTRATION; INTRACAUDAL; PERINEURAL AS NEEDED
Status: DISCONTINUED | OUTPATIENT
Start: 2022-11-21 | End: 2022-11-21

## 2022-11-21 RX ORDER — HYDROMORPHONE HCL/PF 1 MG/ML
0.5 SYRINGE (ML) INJECTION
Status: DISCONTINUED | OUTPATIENT
Start: 2022-11-21 | End: 2022-11-21 | Stop reason: HOSPADM

## 2022-11-21 RX ORDER — LORAZEPAM 2 MG/ML
2 INJECTION INTRAMUSCULAR ONCE
Status: DISCONTINUED | OUTPATIENT
Start: 2022-11-21 | End: 2022-11-21

## 2022-11-21 RX ADMIN — METOPROLOL SUCCINATE 25 MG: 25 TABLET, EXTENDED RELEASE ORAL at 23:24

## 2022-11-21 RX ADMIN — AMLODIPINE BESYLATE 5 MG: 5 TABLET ORAL at 09:30

## 2022-11-21 RX ADMIN — ONDANSETRON 4 MG: 2 INJECTION INTRAMUSCULAR; INTRAVENOUS at 16:56

## 2022-11-21 RX ADMIN — LORAZEPAM 2 MG: 2 INJECTION INTRAMUSCULAR; INTRAVENOUS at 19:56

## 2022-11-21 RX ADMIN — CEFTRIAXONE 1000 MG: 1 INJECTION, SOLUTION INTRAVENOUS at 13:52

## 2022-11-21 RX ADMIN — PROPOFOL 150 MG: 10 INJECTION, EMULSION INTRAVENOUS at 16:53

## 2022-11-21 RX ADMIN — SODIUM CHLORIDE 75 ML/HR: 0.9 INJECTION, SOLUTION INTRAVENOUS at 09:34

## 2022-11-21 RX ADMIN — SODIUM CHLORIDE, SODIUM LACTATE, POTASSIUM CHLORIDE, AND CALCIUM CHLORIDE: .6; .31; .03; .02 INJECTION, SOLUTION INTRAVENOUS at 16:04

## 2022-11-21 RX ADMIN — FENTANYL CITRATE 25 MCG: 50 INJECTION, SOLUTION INTRAMUSCULAR; INTRAVENOUS at 16:57

## 2022-11-21 RX ADMIN — DEXAMETHASONE SODIUM PHOSPHATE 10 MG: 10 INJECTION, SOLUTION INTRAMUSCULAR; INTRAVENOUS at 16:56

## 2022-11-21 RX ADMIN — GLYCOPYRROLATE 0.1 MG: 0.2 INJECTION, SOLUTION INTRAMUSCULAR; INTRAVENOUS at 16:57

## 2022-11-21 RX ADMIN — LIDOCAINE HYDROCHLORIDE 50 MG: 10 INJECTION, SOLUTION EPIDURAL; INFILTRATION; INTRACAUDAL at 16:53

## 2022-11-21 RX ADMIN — METOPROLOL SUCCINATE 25 MG: 25 TABLET, EXTENDED RELEASE ORAL at 09:30

## 2022-11-21 RX ADMIN — AMLODIPINE BESYLATE 5 MG: 5 TABLET ORAL at 23:24

## 2022-11-21 NOTE — PLAN OF CARE
Problem: Potential for Falls  Goal: Patient will remain free of falls  Description: INTERVENTIONS:  - Educate patient/family on patient safety including physical limitations  - Instruct patient to call for assistance with activity   - Keep Call bell within reach  - Keep bed low and locked with side rails adjusted as appropriate  - Keep care items and personal belongings within reach  - Initiate and maintain comfort rounds  - Make Fall Risk Sign visible to staff  Outcome: Progressing     Problem: INFECTION - ADULT  Goal: Absence of fever/infection during neutropenic period  Description: INTERVENTIONS:  - Monitor WBC    Outcome: Progressing     Problem: GENITOURINARY - ADULT  Goal: Maintains or returns to baseline urinary function  Description: INTERVENTIONS:  - Assess urinary function  - Encourage oral fluids to ensure adequate hydration if ordered  - Administer IV fluids as ordered to ensure adequate hydration  - Administer ordered medications as needed  - Offer frequent toileting  - Follow urinary retention protocol if ordered  Outcome: Progressing

## 2022-11-21 NOTE — ANESTHESIA PREPROCEDURE EVALUATION
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   (+) Stage 2 chronic kidney disease      GYN   (+) Adenocarcinoma of endometrium, stage 3 (HCC)   (+) Uterine cancer (HCC)      NEURO/PSYCH   (+) H/O small bowel obstruction        Physical Exam    Airway    Mallampati score: II  TM Distance: >3 FB  Neck ROM: full     Dental   No notable dental hx     Cardiovascular      Pulmonary      Other Findings        Anesthesia Plan  ASA Score- 2     Anesthesia Type- general with ASA Monitors  Additional Monitors:   Airway Plan: LMA  Plan Factors-Exercise tolerance (METS): >4 METS  Chart reviewed  Existing labs reviewed  Patient summary reviewed  Patient is not a current smoker  There is medical exclusion for perioperative obstructive sleep apnea risk education  Induction- intravenous  Postoperative Plan- Plan for postoperative opioid use  Informed Consent- Anesthetic plan and risks discussed with patient  I personally reviewed this patient with the CRNA  Discussed and agreed on the Anesthesia Plan with the CRNA  Ilya Torrez

## 2022-11-21 NOTE — ASSESSMENT & PLAN NOTE
Recent Labs     11/20/22  1434 11/21/22  0550   * 80*   * 130*   ALKPHOS 250* 195*   TBILI 5 37* 5 45*     EtOH: No  Drug use: No  Acetaminophen: On occasion     Imaging  · CT Abd/Pev: There are gallstone(s) within the gallbladder, without pericholecystic inflammatory changes  No biliary dilatation  · U/S: gallbladder is normal with no wall thickening or pericholecystic fluid  Gallstones noted  No sonographic Lay sign  There is a cholestatic / mixed pattern in terms of the transaminitis  Hepatitis panel negative, GGT elevated but consistent with cholestatic disease      Plan:  · Continue to trend LFTs  · ASMA, RU, possible A1AT  · GI following, recommending MRI for further biliary/liver work up  · Per Gen Surg may require MRCP vs ERCP   · NPO   · IVF NS 75 mL/hr

## 2022-11-21 NOTE — PROGRESS NOTES
Progress Note - General Surgery   Yobani Painting 66 y o  female MRN: [id number]  Unit/Bed#: S -01 Encounter: [id number]    Assessment:  Elevated LFTs (transaminitis, hyperbilirubinemia) -- unclear etiology  Cholelithiasis without evidence of cholecystitis or choledocholithiasis  Recurrent UTI i/s/o chronic L ureteral stent    Plan:  • F/u AM labwork  • If LFTs worsening, may require MRCP (vs ERCP, if unwilling to have MRI)  • NPO pending AM labs and GI consult     Subjective/Objective     Subjective:   No acute events overnight  Still c/o UTI symptoms  No upper abdominal pain  Objective:    Blood pressure 121/59, pulse 68, temperature 98 8 °F (37 1 °C), temperature source Oral, resp  rate 15, SpO2 93 %  ,There is no height or weight on file to calculate BMI        Intake/Output Summary (Last 24 hours) at 11/21/2022 0701  Last data filed at 11/20/2022 1606  Gross per 24 hour   Intake 550 ml   Output --   Net 550 ml       Invasive Devices     Peripheral Intravenous Line  Duration           Peripheral IV 11/20/22 Left;Ventral (anterior) Forearm <1 day          Drain  Duration           Ureteral Internal Stent Left ureter 6 Fr  90 days                Physical Exam:   Gen:  NAD  CV:  warm, well-perfused  Lungs: nl effort on RA  Abd:  soft, non-tender, non-distended   Ext:  no CCE  Skin: no jaundice  Neuro: A&Ox3     Results from last 7 days   Lab Units 11/21/22  0550 11/20/22  2046 11/20/22  1434   WBC Thousand/uL 5 29  --  7 14   HEMOGLOBIN g/dL 11 2*  --  13 2   HEMATOCRIT % 35 4  --  41 2   PLATELETS Thousands/uL 167 167 203     Results from last 7 days   Lab Units 11/20/22  1434   POTASSIUM mmol/L 3 8   CHLORIDE mmol/L 104   CO2 mmol/L 23   BUN mg/dL 13   CREATININE mg/dL 0 88   CALCIUM mg/dL 9 4     Results from last 7 days   Lab Units 11/20/22  1434   INR  1 07   PTT seconds 27

## 2022-11-21 NOTE — CONSULTS
Consultation - [facility]  Sgirid Gormank 66 y o  female MRN: [id number]  Unit/Bed#: S -50 Encounter: [id number]        Inpatient consult to gastroenterology  Consult performed by: Karl Lau MD  Consult ordered by: Deon Rico DO          Reason for Consult / Principal Problem:  Elevated liver enzymes    Assessment and plan:    1 Elevated liver enzymes- presents on admission with symptoms such as chills, nausea, lower abdominal pain  No vomiting, diarrhea, right upper quadrant pain after meals  On admission lab work remarkable for  elevated liver enzymes , ALT  231, alk phos 250, total bilirubin 5 37, direct bilirubin 1 67, gmma GT elevated 112  Right upper quadrant ultrasound showed cholelithiasis without signs of cholecystitis  CT renal stone study showed no clinically abnormality of the liver  There were noted gallstones without pericholecystic inflammatory changes, no biliary dilation  As of today's lab work liver enzymes are trending down:  , AST 80, alk-phos 195, however total bilirubin slightly trending up 5 45  Pending acute hepatitis panel, RU, alpha-1 antitrypsin  Possible incidental findings of the elevated LFTs considerimg that the symptoms are more consistent with UTI then GI  Denies recent medication or supplements, alcohol abuse, recent travel, recent infection  Suspicion for elevated liver enzymes induced by cholelithiasis  Less likely autoimmune hepatitis, viral hepatitis, drug-induced liver injury  Plan:  -highly recommended  MRCP procedure-patient hesitant about undergoing MRI due to increased anxiety  Will reassess total bilirubin tomorrow if persistently high will still highly recommend   -continue IV fluids  -patient currently NPO pending Urology assessment  2  Urinary tract infection  3   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  4 Essential hypertension      -------------------------------------------------------------------------------------------------------------------    HPI:  78-year-old female presents with episodes of chills, nausea, down suprapubic abdominal pain 9/10 however no burning or pain with urination  that started on November 18  Patient on admission mainly concerned about possible recurrent UTI by noting cloudy, foul-smelling urine  Patient denies cholecystitis symptoms such as abdominal pain after meals, vomiting  Patient denies recent travel, recent infection exposure, blood transfusion, alcohol abuse (reports having 1 glass of wine a day once in a while), a new medication or supplements  Patient has history of hysterectomy about 20 years ago due to weakness and had several small bowel obstruction episode for the past 2 years treated conservatively  Patient also known for chronic left UPJ obstruction following Urology  On admission was noted elevated , ALT  231, alk phos 250, total bilirubin 5 37, direct bilirubin 1 67  Gamma GT elevated 112  Lipase and lactic acid normal   Right upper quadrant ultrasound showed cholelithiasis without signs of cholecystitis  CT renal stone study showed no clinically abnormality of the liver  There were noted gallstones without pericholecystic inflammatory changes, no biliary dilation  No ascites, no pneumoperitoneum, no lymphadenopathy noted  Was noted persistently dilated left renal pelvis despite the presence of the left ureteral stent   Patient was evaluated by general surgery team and recommended MRCP however patient hesitant about undergoing the procedure  Per surgery team low suspicion for cholecystitis, no antibiotic recommendation, deferred antibiotic treatment per primary team     As of today's lab work liver enzymes are trending down:  , AST 80, alk-phos 195, however total bilirubin slightly trending up 5 45    Pending acute hepatitis panel, RU, alpha-1 antitrypsin  GI team was consulted for further evaluation of elevated liver enzymes  REVIEW OF SYSTEMS:    CONSTITUTIONAL:  Reported chills on admission which currently resolved  Low appetite due to nausea  Denies any fever, or rigors  No recent weight loss  HEENT: No earache or tinnitus  Denies hearing loss or visual disturbances  CARDIOVASCULAR: No chest pain or palpitations  RESPIRATORY: Denies any cough, hemoptysis, shortness of breath or dyspnea on exertion  GASTROINTESTINAL: As noted in the History of Present Illness  GENITOURINARY: No problems with urination  Denies any hematuria or dysuria  NEUROLOGIC: No dizziness or vertigo, denies headaches  MUSCULOSKELETAL: Denies any muscle or joint pain  SKIN: Denies skin rashes or itching  ENDOCRINE: Denies excessive thirst  Denies intolerance to heat or cold  PSYCHOSOCIAL: Denies depression or anxiety  Denies any recent memory loss         Historical Information   Past Medical History:   Diagnosis Date   • Chronic kidney disease    • GERD (gastroesophageal reflux disease)    • Hypertension    • Lyme disease      Past Surgical History:   Procedure Laterality Date   • FL RETROGRADE PYELOGRAM  10/21/2021   • FL RETROGRADE PYELOGRAM  2/25/2022   • FL RETROGRADE PYELOGRAM  8/22/2022   • HYSTERECTOMY     • KS CYSTOURETHROSCOPY,URETER CATHETER Bilateral 10/21/2021    Procedure: CYSTOSCOPY RETROGRADE PYELOGRAM WITH INSERTION STENT URETERAL;  Surgeon: Sulema Horvath MD;  [facility];  Service: Urology   • KS CYSTOURETHROSCOPY,URETER CATHETER Left 2/25/2022    Procedure: CYSTOSCOPY RETROGRADE PYELOGRAM WITH INSERTION STENT URETERAL; DIAGNOSTIC URETEROSCOPY;  Surgeon: Sulema Horvath MD;  Location: [facility];  Service: Urology   • KS CYSTOURETHROSCOPY,URETER CATHETER Left 8/22/2022    Procedure: CYSTOSCOPY RETROGRADE PYELOGRAM WITH EXCHANGE STENT URETERAL;  Surgeon: Konstantin Hernandez MD;  Location: [facility];  Service: Urology     Social History   Social History     Substance and Sexual Activity   Alcohol Use Yes    Comment: socially     Social History     Substance and Sexual Activity   Drug Use No     Social History     Tobacco Use   Smoking Status Never   Smokeless Tobacco Never     Family History   Problem Relation Age of Onset   • Stroke Father        Meds/Allergies     Medications Prior to Admission   Medication   • amLODIPine (NORVASC) 5 mg tablet   • metoprolol succinate (TOPROL-XL) 25 mg 24 hr tablet     Current Facility-Administered Medications   Medication Dose Route Frequency   • amLODIPine (NORVASC) tablet 5 mg  5 mg Oral BID   • cefTRIAXone (ROCEPHIN) IVPB (premix in dextrose) 1,000 mg 50 mL  1,000 mg Intravenous Q24H   • enoxaparin (LOVENOX) subcutaneous injection 40 mg  40 mg Subcutaneous Q24H [place]   • metoprolol succinate (TOPROL-XL) 24 hr tablet 25 mg  25 mg Oral BID   • morphine injection 2 mg  2 mg Intravenous Q4H PRN   • oxyCODONE (ROXICODONE) IR tablet 2 5 mg  2 5 mg Oral Q4H PRN   • oxyCODONE (ROXICODONE) IR tablet 5 mg  5 mg Oral Q4H PRN   • sodium chloride 0 9 % infusion  75 mL/hr Intravenous Continuous       Allergies   Allergen Reactions   • Bactrim [Sulfamethoxazole-Trimethoprim] Other (See Comments)     Her mom  from [place], Family histor? • Cefixime Other (See Comments)     30 years ago, unclear reaction  Believes allergy listed due to C Dif  Tolerated Ceftriaxone 10/21   • Sulfa Antibiotics Other (See Comments)     Causes sores on skin - per pt           Objective     Blood pressure 126/60, pulse 62, temperature 98 2 °F (36 8 °C), temperature source Oral, resp  rate 18, SpO2 94 %        Intake/Output Summary (Last 24 hours) at 2022 0911  Last data filed at 2022 1606  Gross per 24 hour   Intake 550 ml   Output --   Net 550 ml         PHYSICAL EXAM:      General Appearance:   Alert, cooperative, no distress, appears stated age    HEENT:   Normocephalic, atraumatic, scleral icterus no oropharyngeal thrush present      Neck:  Supple, symmetrical, trachea midline, no adenopathy;    thyroid: no enlargement/tenderness/nodules; no carotid  bruit or JVD    Lungs:   Clear to auscultation bilaterally; no rales, rhonchi or wheezing; respirations unlabored    Heart[de-identified]   S1 and S2 normal; regular rate and rhythm; no murmur, rub, or gallop  Abdomen:   Soft, non-tender on palpation, non-distended; normal bowel sounds; no masses, no organomegaly    Genitalia:   Deferred    Rectal:   Deferred    Extremities:  No cyanosis, clubbing or edema    Pulses:  2+ and symmetric all extremities    Skin:  Skin color, texture, turgor normal, no rashes or lesions    Lymph nodes:  No palpable cervical, axillary or inguinal lymphadenopathy        Lab Results:   Results from last 7 days   Lab Units 11/21/22  0550   WBC Thousand/uL 5 29   HEMOGLOBIN g/dL 11 2*   HEMATOCRIT % 35 4   PLATELETS Thousands/uL 167   NEUTROS PCT % 71   LYMPHS PCT % 14   MONOS PCT % 13*   EOS PCT % 1     Results from last 7 days   Lab Units 11/21/22  0550   POTASSIUM mmol/L 4 1   CHLORIDE mmol/L 109*   CO2 mmol/L 23   BUN mg/dL 12   CREATININE mg/dL 0 72   CALCIUM mg/dL 8 6   ALK PHOS U/L 195*   ALT U/L 130*   AST U/L 80*     Results from last 7 days   Lab Units 11/20/22  1434   INR  1 07     Results from last 7 days   Lab Units 11/20/22  1434   LIPASE u/L 15       Imaging Studies: I have personally reviewed pertinent imaging studies  CT renal stone study abdomen pelvis without contrast    Result Date: 11/20/2022  Impression: 1  Persistent markedly dilated left renal pelvis despite the presence of a left ureteral stent  This is similar in caliber to the prior CT  Correlate clinically to exclude obstructed stent  Workstation performed: [id number]     US right upper quadrant    Result Date: 11/20/2022  Impression: 1  Cholelithiasis  Workstation performed: [id number]           Patient was seen and examined by Dr Opal Blackwell   [facility] decisions were made by Dr Mariano Persons  Thank you for allowing us to participate in the care of this present patient  We will follow-up with you closely

## 2022-11-21 NOTE — ASSESSMENT & PLAN NOTE
- CT renal stone study showing persistent markedly dilated left renal pelvis despite the presence of a left ureteral stent  This is similar in caliber to the prior CT  Correlate clinically to exclude obstructed stent    - Patient s/p UPJ stent '21 with recent exchange in August  - Left CVA tenderness on examination    Plan  - F/u Urology

## 2022-11-21 NOTE — TELEPHONE ENCOUNTER
Patient known to me chronic left stent  She is now status post exchange performed as an inpatient  Her stent exchange had been scheduled in January per the patient's request     At this point she understands that it should be performed every 3 months  Due to the degree of hydronephrosis she is at risk for continued stent migration      Let us reschedule her next stent exchange for proximally 3 months from now (towards the end of February 2023)

## 2022-11-21 NOTE — ASSESSMENT & PLAN NOTE
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

## 2022-11-21 NOTE — ASSESSMENT & PLAN NOTE
Recent Labs     11/20/22  1434   *   *   ALKPHOS 250*   TBILI 5 37*     EtOH: No  Drug use: No  Acetaminophen: On occasion     Imaging  · CT Abd/Pev: There are gallstone(s) within the gallbladder, without pericholecystic inflammatory changes  No biliary dilatation  · U/S: gallbladder is normal with no wall thickening or pericholecystic fluid  Gallstones noted  No sonographic Lay sign      There is a cholestatic / mixed pattern in terms of the transaminitis    Plan:  · Continue to trend LFTs  · Hepatitis panel  · ASMA, RU, possible A1AT  · F/u GI consult  · F/u GGT   · Per Gen Surg may require MRCP vs ERCP   · NPO   · IVF NS 75 mL/hr

## 2022-11-21 NOTE — PROGRESS NOTES
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   - Patient undergoing stent management every 3-12 months per Urology  - Patient presenting to ED complaining of lower abdominal/pelvic tenderness described as 7/10 dull pain/pressure; similar to her prior UTI symptoms with cloudy urine, foul smell, chills, nausea starting 2 days ago  - Patient afebrile on admission with no leukocytosis  - UA in ED showing +nitrites, +WBC, no bacteria    Plan  - Start Ceftriaxone 1g qd day 1/7  - F/u Urology   - IVF NS 75 mL/hr   - F/u Urine cx  - CBC, CMP am  - F/u blood cx      Hydronephrosis 2/2 L UPJ Obstruction  Assessment & Plan  - CT renal stone study showing persistent markedly dilated left renal pelvis despite the presence of a left ureteral stent  This is similar in caliber to the prior CT  Correlate clinically to exclude obstructed stent  - Patient s/p UPJ stent '21 with recent exchange in August and was supposed to have another exchange this month but did not follow up  - Left CVA tenderness on examination    Plan  - Urology will be exchanging stent today, 11/21/22 and follow up outpatient    * Transaminitis  [address]     11/20/22  1434 11/21/22  0550   * 80*   * 130*   ALKPHOS 250* 195*   TBILI 5 37* 5 45*     EtOH: No  Drug use: No  Acetaminophen: On occasion     Imaging  · CT Abd/Pev: There are gallstone(s) within the gallbladder, without pericholecystic inflammatory changes  No biliary dilatation  · U/S: gallbladder is normal with no wall thickening or pericholecystic fluid  Gallstones noted   No sonographic Lay sign  There is a cholestatic / mixed pattern in terms of the transaminitis  Hepatitis panel negative, GGT elevated but consistent with cholestatic disease  Plan:  · Continue to trend LFTs  · ASMA, RU, possible A1AT  · GI following, recommending MRI for further biliary/liver work up  · Per Gen Surg may require MRCP vs ERCP   · NPO   · IVF NS 75 mL/hr      Essential hypertension  Assessment & Plan  Plan:  Continue amlodipine 5 mg BID  Continue metoprolol 25 mg BID         VTE Pharmacologic Prophylaxis: VTE Score: 4 Moderate Risk (Score 3-4) - Pharmacological DVT Prophylaxis Ordered: enoxaparin (Lovenox)  Patient Centered Rounds: I performed bedside rounds with nursing staff today  Discussions with Specialists or Other Care Team Provider: Urology - exchanging L UPJ stent today  GI - will re-evaluate labs tomorrow and decide on MRI  Education and Discussions with Family / Patient: Patient declined call to   Current Length of Stay: 1 day(s)  Current Patient Status: Inpatient   Discharge Plan: Anticipate discharge in 24-48 hrs to home  Code Status: Level 1 - Full Code    Subjective:   Patient was examined at the bedside, appears to be in no acute distress, she is pleasant, calm, and BRANTLEY x3  The patient is not complaining of any N/V or constipation/diarrhea  The patient is still having some urinary urgency but is urinating frequently, but her urine still appears a little cloudy, however, she is having less suprapubic pressure/tenderness as compared to previously  The patient is aware she will be having a UPJ stent exchanged today by Urology and her LFT's were explained to her  The patient is very anxious regarding MRI which is what GI is recommending, the patient states she does not do well with enclosed MRI, and that she would be more amenable if they could sedate her during this   The patient was informed that GI would be around to discuss with her, and that sedation is likely a good option, but that we will continue trending her LFT's for now, as it may be transient  The patient has no questions, comments, or concerns at this time  Objective:     Vitals:   Temp (24hrs), Av 5 °F (36 9 °C), Min:98 2 °F (36 8 °C), Max:98 8 °F (37 1 °C)    Temp:  [98 2 °F (36 8 °C)-98 8 °F (37 1 °C)] 98 5 °F (36 9 °C)  HR:  [62-70] 64  Resp:  [15-18] 18  BP: (107-149)/(50-67) 130/54  SpO2:  [93 %-96 %] 96 %  There is no height or weight on file to calculate BMI  Input and Output Summary (last 24 hours): Intake/Output Summary (Last 24 hours) at 2022 1451  Last data filed at 2022 1606  Gross per 24 hour   Intake 550 ml   Output --   Net 550 ml       Physical Exam:   Physical Exam  Vitals and nursing note reviewed  Constitutional:       Appearance: Normal appearance  She is well-developed and normal weight  She is not ill-appearing, toxic-appearing or diaphoretic  HENT:      Head: Normocephalic and atraumatic  Cardiovascular:      Rate and Rhythm: Normal rate and regular rhythm  Pulses: Normal pulses  Heart sounds: Normal heart sounds  No murmur heard  No friction rub  No gallop  Pulmonary:      Effort: Pulmonary effort is normal  No respiratory distress  Breath sounds: Normal breath sounds  No wheezing or rhonchi  Chest:      Chest wall: No tenderness  Abdominal:      General: Abdomen is flat  Bowel sounds are normal  There is no distension  Palpations: Abdomen is soft  There is no fluid wave, hepatomegaly or splenomegaly  Tenderness: There is no abdominal tenderness  There is no guarding  Negative signs include Lay's sign and McBurney's sign  Musculoskeletal:         General: No swelling  Cervical back: Neck supple  Right lower leg: No edema  Left lower leg: No edema  Skin:     General: Skin is warm and dry  Capillary Refill: Capillary refill takes less than 2 seconds     Neurological:      General: No focal deficit present  Mental Status: She is alert and oriented to person, place, and time  Psychiatric:         Mood and Affect: Mood normal          Behavior: Behavior normal          Thought Content: Thought content normal          Judgment: Judgment normal           Additional Data:     Labs:  Results from last 7 days   Lab Units 11/21/22  0550   WBC Thousand/uL 5 29   HEMOGLOBIN g/dL 11 2*   HEMATOCRIT % 35 4   PLATELETS Thousands/uL 167   NEUTROS PCT % 71   LYMPHS PCT % 14   MONOS PCT % 13*   EOS PCT % 1     Results from last 7 days   Lab Units 11/21/22  0550   SODIUM mmol/L 139   POTASSIUM mmol/L 4 1   CHLORIDE mmol/L 109*   CO2 mmol/L 23   BUN mg/dL 12   CREATININE mg/dL 0 72   ANION GAP mmol/L 7   CALCIUM mg/dL 8 6   ALBUMIN g/dL 3 3*   TOTAL BILIRUBIN mg/dL 5 45*   ALK PHOS U/L 195*   ALT U/L 130*   AST U/L 80*   GLUCOSE RANDOM mg/dL 102     Results from last 7 days   Lab Units 11/20/22  1434   INR  1 07             Results from last 7 days   Lab Units 11/20/22  1434   LACTIC ACID mmol/L 1 2       Lines/Drains:  Invasive Devices     Peripheral Intravenous Line  Duration           Peripheral IV 11/20/22 Left;Ventral (anterior) Forearm 1 day          Drain  Duration           Ureteral Internal Stent Left ureter 6 Fr  91 days                      Imaging: Reviewed radiology reports from this admission including: abdominal/pelvic CT and ultrasound(s)     CT Abdomen:  Study Result    Narrative & Impression   CT ABDOMEN AND PELVIS WITHOUT IV CONTRAST - LOW DOSE RENAL STONE      INDICATION:   Flank pain, kidney stone suspected  Dysuria, temperature elevation, indwelling L ureteral stent      COMPARISON:  CT 8/21/2022     TECHNIQUE:  Low radiation dose thin section CT examination of the abdomen and pelvis was performed without intravenous or oral contrast according to a protocol specifically designed to evaluate for urinary tract calculus    Axial, sagittal, and coronal 2D   reformatted images were created from the source data and submitted for interpretation  Evaluation for pathology in the abdomen and pelvis that is unrelated to urinary tract calculi is limited        Radiation dose length product (DLP) for this visit:  117 mGy-cm   This examination, like all CT scans performed in the [facility], was performed utilizing techniques to minimize radiation dose exposure, including the use of iterative   reconstruction and automated exposure control      URINARY TRACT FINDINGS:     RIGHT KIDNEY AND URETER:  No urinary tract calculi  No hydronephrosis or hydroureter      LEFT KIDNEY AND URETER:  Persistent markedly dilated left renal pelvis despite the presence of a left ureteral stent  This is similar in caliber to the prior CT      URINARY BLADDER:  Unremarkable         ADDITIONAL FINDINGS:     LOWER CHEST:  No clinically significant abnormality identified in the visualized lower chest      SOLID VISCERA: Limited low radiation dose noncontrast CT evaluation demonstrates no clinically significant abnormality of the imaged portions of the liver, spleen, pancreas, or left adrenal gland  Stable right adrenal adenoma      GALLBLADDER/BILIARY TREE:  There are gallstone(s) within the gallbladder, without pericholecystic inflammatory changes  No biliary dilatation      STOMACH AND BOWEL:  Unremarkable      APPENDIX:  No findings to suggest appendicitis      ABDOMINOPELVIC CAVITY:  No ascites  No pneumoperitoneum  No lymphadenopathy      REPRODUCTIVE ORGANS:  Surgical changes of prior hysterectomy      ABDOMINAL WALL/INGUINAL REGIONS:  Stable small midline ventral hernia containing fat and a small amount of fluid      OSSEOUS STRUCTURES:  No acute fracture or destructive osseous lesion  Spine degenerative change and scoliosis      IMPRESSION:  1  Persistent markedly dilated left renal pelvis despite the presence of a left ureteral stent  This is similar in caliber to the prior CT   Correlate clinically to exclude obstructed stent  US RUQ:  Study Result    Narrative & Impression   RIGHT UPPER QUADRANT ULTRASOUND     INDICATION:     Epigastric discomfort, nausea, elevated LFTs      COMPARISON:  None     TECHNIQUE:   Real-time ultrasound of the right upper quadrant was performed with a curvilinear transducer with both volumetric sweeps and still imaging techniques      FINDINGS:     PANCREAS:  Visualized portions of the pancreas are within normal limits      AORTA AND IVC:  Visualized portions are normal for patient age      LIVER:  Size:  Within normal range  The liver measures 16 0 cm in the midclavicular line  Contour:  Surface contour is smooth  Parenchyma:  Echogenicity and echotexture are within normal limits  No liver mass identified  Limited imaging of the main portal vein shows it to be patent and hepatopetal      BILIARY:  The gallbladder is normal in caliber  No wall thickening or pericholecystic fluid  Shadowing gallstone(s) identified  No sonographic Lay's sign  No intrahepatic biliary dilatation  CBD measures 6 0 mm  No choledocholithiasis      KIDNEY:   Right kidney measures 10 3 x 4 9 x 5 1 cm  Volume 134 5 mL  Kidney within normal limits      ASCITES:   None      IMPRESSION:  1  Cholelithiasis  Recent Cultures (last 7 days):   Results from last 7 days   Lab Units 11/20/22  1458   BLOOD CULTURE  Received in Microbiology Lab  Culture in Progress  Received in Microbiology Lab  Culture in Progress         Last 24 Hours Medication List:   Current Facility-Administered Medications   Medication Dose Route Frequency Provider Last Rate   • amLODIPine  5 mg Oral BID Cr Ragsdale DO     • cefTRIAXone  1,000 mg Intravenous Q24H Alex Whitman MD 1,000 mg (11/21/22 1352)   • enoxaparin  40 mg Subcutaneous Q24H [address] [facility]     • metoprolol succinate  25 mg Oral BID Natalia Miner DO     • morphine injection  2 mg Intravenous Q4H PRN Natalia Marshall DO Kristofer     • oxyCODONE  2 5 mg Oral Q4H PRN Samuel Leyva DO     • oxyCODONE  5 mg Oral Q4H PRN Miles Miner DO     • sodium chloride  75 mL/hr Intravenous Continuous Miles ceron,  75 mL/hr (11/21/22 7453)        Today, Patient Was Seen By: Tee Huntley DO    **Please Note: This note may have been constructed using a voice recognition system  **

## 2022-11-21 NOTE — H&P
[facility]  H&P- Chandrakant Lighter 1950, 66 y o  female MRN: [id number]  Unit/Bed#: S -69 Encounter: [id number]  Primary Care Provider: Yan Mae DO   Date and time admitted to hospital: 11/20/2022  2:02 PM    * Transaminitis  Assessment & Plan  Recent Labs     11/20/22  1434   *   *   ALKPHOS 250*   TBILI 5 37*     EtOH: No  Drug use: No  Acetaminophen: On occasion     Imaging  · CT Abd/Pev: There are gallstone(s) within the gallbladder, without pericholecystic inflammatory changes  No biliary dilatation  · U/S: gallbladder is normal with no wall thickening or pericholecystic fluid  Gallstones noted  No sonographic Lay sign  There is a cholestatic / mixed pattern in terms of the transaminitis    Plan:  · Continue to trend LFTs  · Hepatitis panel  · ASMA, RU, possible A1AT  · F/u GI consult  · F/u GGT   · Per Gen Surg may require MRCP vs ERCP   · NPO   · IVF NS 75 mL/hr      UTI (urinary tract infection)  Assessment & Plan  - Patient with recurrent UTI's s/p left UPJ stent '21 for 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   - Patient undergoing stent management every 3-12 months per Urology  - Patient presenting to ED complaining of lower abdominal/pelvic tenderness described as 7/10 dull pain/pressure; similar to her prior UTI symptoms with cloudy urine, foul smell, chills, nausea starting 2 days ago  - Patient afebrile on admission with no leukocytosis  - UA in ED showing +nitrites, +WBC, no bacteria    Plan  - Start Ceftriaxone 1g qd   - F/u Urology   - IVF NS 75 mL/hr   - F/u Urine cx  - CBC, CMP am  - F/u blood cx      Hydronephrosis 2/2 L UPJ Obstruction  Assessment & Plan  - CT renal stone study showing persistent markedly dilated left renal pelvis despite the presence of a left ureteral stent  This is similar in caliber to the prior CT  Correlate clinically to exclude obstructed stent    - Patient s/p UPJ stent '21 with recent exchange in August  - Left CVA tenderness on examination    Plan  - F/u Urology    Essential hypertension  Assessment & Plan  - Continue amlodipine 5 mg BID  - Continue metoprolol 25 mg BID     VTE Prophylaxis: Enoxaparin (Lovenox)  / sequential compression device   Code Status: 1  POLST: POLST form is not discussed and not completed at this time  Anticipated Length of Stay:  Patient will be admitted on an Inpatient basis with an anticipated length of stay of  > 2 midnights  Justification for Hospital Stay: UTI, transaminitis      Chief Complaint:   Chills, lower abdominal pain, nausea    History of Present Illness:    Kacy Navarro is a 66 y o  female who presents with onset of chills, nausea, 7/10 dull/pressure pain across lower abdomen that began on Friday (11/18)  Patient also complaining of cloudy, foul smelling urine  Patient has a h/o recurrent UTI's and is s/p left UPJ stent placement '21  Patient was hospitalized in August for UTI and hydronephrosis in setting of ureteral stent and given Rocephin with transition to Keflex  On admission patients UA showing positive nitrites and innumerable WBC, no bacteria  Patient CMP revealing significant transaminitis with AST of 320 and ALT of 250 with Alk phos 250  F/u RUQ US showing cholelithiasis without signs suggestive of cholecystitis  CT renal stone study showing persistent markedly dilated left renal pelvis despite left ureteral stent  Patient without any symptoms of cholecystitis such as abdominal pain worse with eating, negative richards/rovsing  Patient mostly presenting for urinary symptoms in line with her previous UTI's; cloudy, foul smelling urine with associated nausea, chills and lower abdominal pain  No hepatomegaly appreciated  Review of Systems:    Review of Systems   Constitutional: Negative for chills and fatigue  HENT: Negative for congestion and sinus pain  Eyes: Negative for visual disturbance     Respiratory: Negative for chest tightness and shortness of breath  Gastrointestinal: Positive for abdominal pain (7/10 dull/press pain across lower abdomen/pelvis) and nausea  Negative for abdominal distention, constipation, diarrhea and vomiting  Endocrine: Negative for polydipsia and polyuria  Genitourinary: Positive for frequency  Negative for difficulty urinating, dysuria and hematuria  Musculoskeletal: Negative for myalgias  Skin: Negative for color change  Neurological: Negative for dizziness, light-headedness and headaches  Past Medical and Surgical History:     Past Medical History:   Diagnosis Date   • Chronic kidney disease    • GERD (gastroesophageal reflux disease)    • Hypertension    • Lyme disease        Past Surgical History:   Procedure Laterality Date   • FL RETROGRADE PYELOGRAM  10/21/2021   • FL RETROGRADE PYELOGRAM  2/25/2022   • FL RETROGRADE PYELOGRAM  8/22/2022   • HYSTERECTOMY     • ID CYSTOURETHROSCOPY,URETER CATHETER Bilateral 10/21/2021    Procedure: CYSTOSCOPY RETROGRADE PYELOGRAM WITH INSERTION STENT URETERAL;  Surgeon: Komal Sandoval MD;  Location: [facility];  Service: Urology   • ID CYSTOURETHROSCOPY,URETER CATHETER Left 2/25/2022    Procedure: CYSTOSCOPY RETROGRADE PYELOGRAM WITH INSERTION STENT URETERAL; DIAGNOSTIC URETEROSCOPY;  Surgeon: Komal Sandoval MD;  [facility]: [facility];  Service: Urology   • ID CYSTOURETHROSCOPY,URETER CATHETER Left 8/22/2022    Procedure: CYSTOSCOPY RETROGRADE PYELOGRAM WITH EXCHANGE STENT URETERAL;  Surgeon: Rui Baker MD;  Location: [facility];  Service: Urology       Meds/Allergies:    Prior to Admission medications    Medication Sig Start Date End Date Taking?  Authorizing Provider   amLODIPine (NORVASC) 5 mg tablet Take 5 mg by mouth 2 (two) times a day    Historical Provider, MD   metoprolol succinate (TOPROL-XL) 25 mg 24 hr tablet Take 25 mg by mouth 2 (two) times a day  2/20/18   Historical Provider, MD     I have reviewed home medications with patient personally  Allergies: Allergies   Allergen Reactions   • Bactrim [Sulfamethoxazole-Trimethoprim] Other (See Comments)     Her mom  from [address], Family histor? • Cefixime Other (See Comments)     30 years ago, unclear reaction  Believes allergy listed due to C Dif  Tolerated Ceftriaxone 10/21   • Sulfa Antibiotics Other (See Comments)     Causes sores on skin - per pt       Social History:     Marital Status: /Civil Union   Occupation: Retired   Patient Pre-hospital Living Situation: Home   Patient Pre-hospital Level of Mobility: Ambulates w/o assistive device   Patient Pre-hospital Diet Restrictions: None   Substance Use History:   Social History     Substance and Sexual Activity   Alcohol Use Yes    Comment: socially     Social History     Tobacco Use   Smoking Status Never   Smokeless Tobacco Never     Social History     Substance and Sexual Activity   Drug Use No       Family History:    non-contributory    Physical Exam:     Vitals:   Blood Pressure: 149/67 (22)  Pulse: 65 (22)  Temperature: 98 5 °F (36 9 °C) (22)  Temp Source: Oral (22)  Respirations: 16 (22)  SpO2: 95 % (22)    Physical Exam  Constitutional:       General: She is not in acute distress  Appearance: Normal appearance  She is not ill-appearing  HENT:      Head: Normocephalic and atraumatic  Right Ear: External ear normal       Left Ear: External ear normal       Nose: Nose normal    Eyes:      General: No scleral icterus  Extraocular Movements: Extraocular movements intact  Conjunctiva/sclera: Conjunctivae normal    Cardiovascular:      Rate and Rhythm: Normal rate and regular rhythm  Pulses: Normal pulses  Heart sounds: Normal heart sounds  Pulmonary:      Effort: Pulmonary effort is normal       Breath sounds: Normal breath sounds  Abdominal:      General: Abdomen is flat   Bowel sounds are normal  There is no distension  Palpations: Abdomen is soft  Tenderness: There is no abdominal tenderness  There is left CVA tenderness  There is no right CVA tenderness or guarding  Negative signs include Lay's sign, Rovsing's sign and McBurney's sign  Musculoskeletal:      Right lower leg: No edema  Left lower leg: No edema  Skin:     General: Skin is warm and dry  Coloration: Skin is not jaundiced  Neurological:      Mental Status: She is alert and oriented to person, place, and time  Additional Data:     Lab Results: I have personally reviewed pertinent reports  Results from last 7 days   Lab Units 11/20/22  2046 11/20/22  1434   WBC Thousand/uL  --  7 14   HEMOGLOBIN g/dL  --  13 2   HEMATOCRIT %  --  41 2   PLATELETS Thousands/uL 167 203   NEUTROS PCT %  --  81*   LYMPHS PCT %  --  9*   MONOS PCT %  --  10   EOS PCT %  --  0     Results from last 7 days   Lab Units 11/20/22  1434   POTASSIUM mmol/L 3 8   CHLORIDE mmol/L 104   CO2 mmol/L 23   BUN mg/dL 13   CREATININE mg/dL 0 88   CALCIUM mg/dL 9 4   ALK PHOS U/L 250*   ALT U/L 231*   AST U/L 320*     Results from last 7 days   Lab Units 11/20/22  1434   INR  1 07       Imaging: I have personally reviewed pertinent reports  CT renal stone study abdomen pelvis without contrast    Result Date: 11/20/2022  Narrative: CT ABDOMEN AND PELVIS WITHOUT IV CONTRAST - LOW DOSE RENAL STONE INDICATION:   Flank pain, kidney stone suspected Dysuria, temperature elevation, indwelling L ureteral stent  COMPARISON:  CT 8/21/2022 TECHNIQUE:  Low radiation dose thin section CT examination of the abdomen and pelvis was performed without intravenous or oral contrast according to a protocol specifically designed to evaluate for urinary tract calculus  Axial, sagittal, and coronal 2D  reformatted images were created from the source data and submitted for interpretation    Evaluation for pathology in the abdomen and pelvis that is unrelated to urinary tract calculi is limited  Radiation dose length product (DLP) for this visit:  117 mGy-cm   This examination, like all CT scans performed in the [facility], was performed utilizing techniques to minimize radiation dose exposure, including the use of iterative reconstruction and automated exposure control  URINARY TRACT FINDINGS: RIGHT KIDNEY AND URETER:  No urinary tract calculi  No hydronephrosis or hydroureter  LEFT KIDNEY AND URETER:  Persistent markedly dilated left renal pelvis despite the presence of a left ureteral stent  This is similar in caliber to the prior CT  URINARY BLADDER:  Unremarkable  ADDITIONAL FINDINGS: LOWER CHEST:  No clinically significant abnormality identified in the visualized lower chest  SOLID VISCERA: Limited low radiation dose noncontrast CT evaluation demonstrates no clinically significant abnormality of the imaged portions of the liver, spleen, pancreas, or left adrenal gland  Stable right adrenal adenoma  GALLBLADDER/BILIARY TREE:  There are gallstone(s) within the gallbladder, without pericholecystic inflammatory changes  No biliary dilatation  STOMACH AND BOWEL:  Unremarkable  APPENDIX:  No findings to suggest appendicitis  ABDOMINOPELVIC CAVITY:  No ascites  No pneumoperitoneum  No lymphadenopathy  REPRODUCTIVE ORGANS:  Surgical changes of prior hysterectomy  ABDOMINAL WALL/INGUINAL REGIONS:  Stable small midline ventral hernia containing fat and a small amount of fluid  OSSEOUS STRUCTURES:  No acute fracture or destructive osseous lesion  Spine degenerative change and scoliosis  Impression: 1  Persistent markedly dilated left renal pelvis despite the presence of a left ureteral stent  This is similar in caliber to the prior CT  Correlate clinically to exclude obstructed stent   Workstation performed: [id number]     US right upper quadrant    Result Date: 11/20/2022  Narrative: RIGHT UPPER QUADRANT ULTRASOUND INDICATION:     Epigastric discomfort, nausea, elevated LFTs  COMPARISON:  None TECHNIQUE:   Real-time ultrasound of the right upper quadrant was performed with a curvilinear transducer with both volumetric sweeps and still imaging techniques  FINDINGS: PANCREAS:  Visualized portions of the pancreas are within normal limits  AORTA AND IVC:  Visualized portions are normal for patient age  LIVER: Size:  Within normal range  The liver measures 16 0 cm in the midclavicular line  Contour:  Surface contour is smooth  Parenchyma:  Echogenicity and echotexture are within normal limits  No liver mass identified  Limited imaging of the main portal vein shows it to be patent and hepatopetal   BILIARY: The gallbladder is normal in caliber  No wall thickening or pericholecystic fluid  Shadowing gallstone(s) identified  No sonographic Lay's sign  No intrahepatic biliary dilatation  CBD measures 6 0 mm  No choledocholithiasis  KIDNEY: Right kidney measures 10 3 x 4 9 x 5 1 cm  Volume 134 5 mL Kidney within normal limits  ASCITES:   None  Impression: 1  Cholelithiasis  Workstation performed: [id number]       EKG, Pathology, and Other Studies Reviewed on Admission:   · EKG: NS rhythm  Non specific ST elevations  Epic / Care Everywhere Records Reviewed: Yes     ** Please Note: This note has been constructed using a voice recognition system   **

## 2022-11-21 NOTE — PROGRESS NOTES
Progress Note - General Surgery  : RAGINI Red Surgery Resident on Dariel Lennox 66 y o  female MRN: [id number]  Unit/Bed#: S -57 Encounter: [id number]      Assessment:  66 y o  female with LFT abnormalities of unknown etiology       Plan:  Appreciate GI input  F/u AM LFT  F/u MRCP result      Subjective: No acute complaints, denies nausea/emesis, tolerated solid food last night      Objective:     Physical Exam:  GEN: NAD   Ab: Soft, NT/ND  Lung: Normal effort   CV: RRR   Extrem: No CCE   Neuro: A+Ox3       I/O       11/19 0701 11/20 0700 11/20 0701 11/21 0700 11/21 0701 11/22 0700    IV Piggyback  550     Total Intake  550     Net  +550                  Lab, Imaging and other studies: I have personally reviewed pertinent reports    , CBC with diff:   Lab Results   Component Value Date    WBC 5 29 11/21/2022    HGB 11 2 (L) 11/21/2022    HCT 35 4 11/21/2022     (H) 11/21/2022     11/21/2022    MCH 32 1 11/21/2022    MCHC 31 6 11/21/2022    RDW 13 1 11/21/2022    MPV 10 5 11/21/2022    NRBC 0 11/21/2022   , BMP/CMP:   Lab Results   Component Value Date    SODIUM 139 11/21/2022    K 4 1 11/21/2022     (H) 11/21/2022    CO2 23 11/21/2022    BUN 12 11/21/2022    CREATININE 0 72 11/21/2022    CALCIUM 8 6 11/21/2022    AST 80 (H) 11/21/2022     (H) 11/21/2022    ALKPHOS 195 (H) 11/21/2022    EGFR 83 11/21/2022         VTE Pharmacologic Prophylaxis: Enoxaparin (Lovenox)      Ahsan Moreira MD  11/21/2022 12:41 PM

## 2022-11-21 NOTE — OP NOTE
Operative Note     PATIENT:  Mireille Leon (MRN [id number])    DATE OF PROCEDURE:   11/21/2022    PRE-OP DIAGNOSIS:   1) chronic left UPJ obstruction  2) severe hydronephrosis  3) indwelling left ureteral stent    POST-OP DIAGNOSIS:   1) chronic left UPJ obstruction  2) severe hydronephrosis  3) indwelling left ureteral stent    PROCEDURES PERFORMED:  1) Cystoscopy  2) Left retrograde pyelography with fluoroscopic interpretation  3) Left ureteral stent placement    SURGEON:  Corie Carter MD    ASSISTANTS:  There were no qualified teaching residents to assist with this case    ANESTHESIA: General     COMPLICATIONS:   None    ANTIBIOTICS:  Ancef    INTRAOPERATIVE THROMBOEMBOLISM PROPHYLAXIS:  Pneumatic compression stockings      FINDINGS:  Successful stent exchange  Severe hydronephrosis with blunting of the calices      INDICATIONS FOR PROCEDURE:  Mireille Leon is an 66 y o  old female with left UPJ obstruction  She has been managed for some time with chronic ureteral stent exchange     After discussing the options, the patient elected to undergo ureteral stent exchange  We discussed the procedure in detail, the alternatives, and the risks, and they signed informed consent to proceed  PROCEDURE IN DETAIL:   The patient was identified and brought to the OR  Antibiotic prophylaxis and DVT prophylaxis were administered  They were placed in the comfortable dorsal lithotomy position with care to pad all pressure points  They were prepped and draped in the usual sterile fashion using hibiclens  A surgical time out was performed with all in the room in agreement with the correct patient, procedure, indications, and laterality  A 21-Micronesian rigid cystoscope was used to enter the bladder  The bladder was inspected in its entirety and there were no lesions noted  The ureteral orifices were identified in their normal orthotopic positions       The Left ureteral orifice was identified and a 5 Fr open ended catheter was placed into the ureteral orifice  A retrograde pyelogram was performed with injection of 50/50 Isovue with the findings as described above  A Sensor wire was up to the kidney under fluoroscopic guidance  The open-ended catheter is navigated atop the pre-placed wire and advanced to the renal pelvis  the distance between the UVJ and the UPJ was measured and appropriately sized stent was selected  The JJ stent was then passed up the wire  under fluoroscopic guidance into the  kidney with a good curl noted in the kidney and in the bladder  The bladder was drained  The patient was placed back supine, awakened from general anesthesia and brought to recovery room in stable condition  SPECIMENS:   * No orders in the log *     IMPLANTS:   * No implants in log *     COMPLICATIONS: None    DISPOSITION: PACU    PLAN:  Patient will return to the medical [facility]  She is medically stable for discharge from my standpoint  Will defer to the other consultants regarding workup of her potential that a biliary issues  I will plan her next stent exchange in 3 months time

## 2022-11-21 NOTE — ASSESSMENT & PLAN NOTE
- CT renal stone study showing persistent markedly dilated left renal pelvis despite the presence of a left ureteral stent  This is similar in caliber to the prior CT  Correlate clinically to exclude obstructed stent  - Patient s/p UPJ stent '21 with recent exchange in August and was supposed to have another exchange this month but did not follow up    - Left CVA tenderness on examination    Plan  - Urology will be exchanging stent today, 11/21/22 and follow up outpatient

## 2022-11-21 NOTE — ASSESSMENT & PLAN NOTE
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

## 2022-11-21 NOTE — CONSULTS
UROLOGY HISTORY AND PHYSICAL     Patient Identifiers: Megha Guzman (MRN [id number])      Date of Service: 11/21/2022        ASSESSMENT:     66 y o  old female with  well known to me with chronic left UPJ obstruction  She has been managed successfully with ureteral stent exchange  Patient did have a prior episode of stent migration with progressive hydronephrosis previously which required hospitalization and stent replacement  I therefore recommended scheduling her routine stent exchange earlier this month however the patient preferred to wait until after the holidays  She is now admitted with possible complicated UTI and severe though stable left hydronephrosis  Also noted is transaminitis  Otherwise do not believe there are any urgent indications to exchange her stent at this point time I have recommended going ahead and doing as an inpatient so that her other issues can be addressed as not to cloud her clinical picture  This would also minimize the chances of having any acute issues until her next scheduled stent exchange  Patient is grateful and amenable to this plan        PLAN:     To OR for left ureteral stent exchange      History of Present Illness:     Megha Guzman is a 66 y o  old with a history of left UPJ obstruction, well known to me  Patient did have a prior episode of stent migration with progressive hydronephrosis previously which required hospitalization and stent replacement  I therefore recommended scheduling her routine stent exchange earlier this month however the patient preferred to wait until after the holidays  He is now admitted with possible complicated UTI and severe though stable left hydronephrosis  Also noted is transaminitis        Past Medical, Past Surgical History:     Past Medical History:   Diagnosis Date   • Chronic kidney disease    • GERD (gastroesophageal reflux disease)    • Hypertension    • Lyme disease    :    Past Surgical History:   Procedure Laterality Date   • FL RETROGRADE PYELOGRAM  10/21/2021   • FL RETROGRADE PYELOGRAM  2/25/2022   • FL RETROGRADE PYELOGRAM  8/22/2022   • HYSTERECTOMY     • IN CYSTOURETHROSCOPY,URETER CATHETER Bilateral 10/21/2021    Procedure: CYSTOSCOPY RETROGRADE PYELOGRAM WITH INSERTION STENT URETERAL;  Surgeon: Scarlett Arrington MD;  Location: [facility];  Service: Urology   • IN CYSTOURETHROSCOPY,URETER CATHETER Left 2/25/2022    Procedure: CYSTOSCOPY RETROGRADE PYELOGRAM WITH INSERTION STENT URETERAL; DIAGNOSTIC URETEROSCOPY;  Surgeon: Scarlett Arrington MD;  Location: [facility];  Service: Urology   • IN CYSTOURETHROSCOPY,URETER CATHETER Left 8/22/2022    Procedure: CYSTOSCOPY RETROGRADE PYELOGRAM WITH EXCHANGE STENT URETERAL;  Surgeon: Tima Gramajo MD;  Location: [facility];  Service: Urology   :    Medications, Allergies:     Current Facility-Administered Medications:   •  amLODIPine (NORVASC) tablet 5 mg, 5 mg, Oral, BID, Shirlyn Smart Kristofer, DO, 5 mg at 11/21/22 0930  •  [MAR Hold] cefTRIAXone (ROCEPHIN) IVPB (premix in dextrose) 1,000 mg 50 mL, 1,000 mg, Intravenous, Q24H, Shawnee Canavan, MD, Last Rate: 100 mL/hr at 11/21/22 1352, 1,000 mg at 11/21/22 1352  •  [MAR Hold] enoxaparin (LOVENOX) subcutaneous injection 40 mg, 40 mg, Subcutaneous, Q24H [address], Shirlyn Smart Kristofer, DO  •  [facility] Hold] LORazepam (ATIVAN) tablet 2 mg, 2 mg, Oral, Once, Lisa Arzate MD  •  metoprolol succinate (TOPROL-XL) 24 hr tablet 25 mg, 25 mg, Oral, BID, Shirlyn Smart Kristofer, DO, 25 mg at 11/21/22 0930  •  [MAR Hold] morphine injection 2 mg, 2 mg, Intravenous, Q4H PRN, Shirlyn Smart Kristofer, DO  •  [facility] Hold] oxyCODONE (ROXICODONE) IR tablet 2 5 mg, 2 5 mg, Oral, Q4H PRN, Shirlyn Smart Kristofer, DO  •  [facility] Hold] oxyCODONE (ROXICODONE) IR tablet 5 mg, 5 mg, Oral, Q4H PRN, Nabil Miner, DO  •  sodium chloride 0 9 % infusion, 75 mL/hr, Intravenous, Continuous, Holli Hicks, DO, Last Rate: 75 mL/hr at 22 0934, 75 mL/hr at 22 0934    Facility-Administered Medications Ordered in Other Encounters:   •  lactated ringers infusion, , Intravenous, Continuous PRN, Seun Moran CRNA, New Bag at 22 1604    Allergies: Allergies   Allergen Reactions   • Bactrim [Sulfamethoxazole-Trimethoprim] Other (See Comments)     Her mom  from [address], Family histor? • Cefixime Other (See Comments)     30 years ago, unclear reaction  Believes allergy listed due to C Dif  Tolerated Ceftriaxone 10/21   • Sulfa Antibiotics Other (See Comments)     Causes sores on skin - per pt   :    Social and Family History:   Social History:   Social History     Tobacco Use   • Smoking status: Never   • Smokeless tobacco: Never   Vaping Use   • Vaping Use: Never used   Substance Use Topics   • Alcohol use: Yes     Comment: socially   • Drug use: No        Social History     Tobacco Use   Smoking Status Never   Smokeless Tobacco Never       Family History:  Family History   Problem Relation Age of Onset   • Stroke Father    :     Review of Systems:     General: Fever, chills, or night sweats: negative  Cardiac: Negative for chest pain  Pulmonary: Negative for shortness of breath  Gastrointestinal: Abdominal pain negative  Nausea, vomiting, or diarrhea negative  Genitourinary: See HPI above  Patient does nothave hematuria  All other systems queried were negative  Physical Exam:   General: Patient is pleasant and in NAD  Awake and alert  /66   Pulse 68   Temp 99 9 °F (37 7 °C) (Temporal)   Resp 20   SpO2 97%   HEENT:  Normocephalic atraumatic  Cardiac:  Regular rate and rhythm, Peripheral edema: negative  Pulmonary: Non-labored breathing, CTAB  Abdomen: Soft, non-tender, non-distended  No surgical scars  No masses, tenderness, hernias noted  Genitourinary: negative CVA tenderness, neg suprapubic tenderness    Extremities: normal movement in all 4       Labs:     Lab Results Component Value Date    HGB 11 2 (L) 11/21/2022    HCT 35 4 11/21/2022    WBC 5 29 11/21/2022     11/21/2022   ]    Lab Results   Component Value Date    K 4 1 11/21/2022     (H) 11/21/2022    CO2 23 11/21/2022    BUN 12 11/21/2022    CREATININE 0 72 11/21/2022    CALCIUM 8 6 11/21/2022   ]    Imaging:   I personally reviewed the images and report of the following studies, and reviewed them with the patient:        Thank you for allowing me to participate in this patients’ care  Please do not hesitate to call with any additional questions    Mari Daugherty MD

## 2022-11-21 NOTE — ANESTHESIA POSTPROCEDURE EVALUATION
Post-Op Assessment Note    CV Status:  Stable  Pain Score: 0    Pain management: adequate     Mental Status:  Alert and awake   Hydration Status:  Euvolemic   PONV Controlled:  Controlled   Airway Patency:  Patent   Two or more mitigation strategies used for obstructive sleep apnea   Post Op Vitals Reviewed: Yes      Staff: CRNA         No notable events documented      BP   112/56   Temp   97 4   Pulse  74   Resp   16   SpO2   100

## 2022-11-22 ENCOUNTER — TELEPHONE (OUTPATIENT)
Dept: OTHER | Facility: OTHER | Age: 72
End: 2022-11-22

## 2022-11-22 LAB
ALBUMIN SERPL BCP-MCNC: 3.2 G/DL (ref 3.5–5)
ALP SERPL-CCNC: 169 U/L (ref 34–104)
ALT SERPL W P-5'-P-CCNC: 80 U/L (ref 7–52)
ANION GAP SERPL CALCULATED.3IONS-SCNC: 7 MMOL/L (ref 4–13)
AST SERPL W P-5'-P-CCNC: 20 U/L (ref 13–39)
BACTERIA UR CULT: ABNORMAL
BILIRUB SERPL-MCNC: 1.12 MG/DL (ref 0.2–1)
BUN SERPL-MCNC: 20 MG/DL (ref 5–25)
CALCIUM ALBUM COR SERPL-MCNC: 8.9 MG/DL (ref 8.3–10.1)
CALCIUM SERPL-MCNC: 8.3 MG/DL (ref 8.4–10.2)
CHLORIDE SERPL-SCNC: 110 MMOL/L (ref 96–108)
CO2 SERPL-SCNC: 21 MMOL/L (ref 21–32)
CREAT SERPL-MCNC: 0.63 MG/DL (ref 0.6–1.3)
GFR SERPL CREATININE-BSD FRML MDRD: 89 ML/MIN/1.73SQ M
GLUCOSE SERPL-MCNC: 218 MG/DL (ref 65–140)
POTASSIUM SERPL-SCNC: 4.3 MMOL/L (ref 3.5–5.3)
PROT SERPL-MCNC: 6 G/DL (ref 6.4–8.4)
SODIUM SERPL-SCNC: 138 MMOL/L (ref 135–147)

## 2022-11-22 RX ORDER — CALCIUM CARBONATE 200(500)MG
500 TABLET,CHEWABLE ORAL 2 TIMES DAILY PRN
Status: DISCONTINUED | OUTPATIENT
Start: 2022-11-22 | End: 2022-11-23 | Stop reason: HOSPADM

## 2022-11-22 RX ADMIN — SODIUM CHLORIDE 75 ML/HR: 0.9 INJECTION, SOLUTION INTRAVENOUS at 03:49

## 2022-11-22 RX ADMIN — CEFTRIAXONE 1000 MG: 1 INJECTION, SOLUTION INTRAVENOUS at 14:04

## 2022-11-22 RX ADMIN — CALCIUM CARBONATE (ANTACID) CHEW TAB 500 MG 500 MG: 500 CHEW TAB at 12:09

## 2022-11-22 RX ADMIN — METOPROLOL SUCCINATE 25 MG: 25 TABLET, EXTENDED RELEASE ORAL at 20:07

## 2022-11-22 RX ADMIN — METOPROLOL SUCCINATE 25 MG: 25 TABLET, EXTENDED RELEASE ORAL at 08:26

## 2022-11-22 RX ADMIN — SODIUM CHLORIDE 75 ML/HR: 0.9 INJECTION, SOLUTION INTRAVENOUS at 14:02

## 2022-11-22 RX ADMIN — AMLODIPINE BESYLATE 5 MG: 5 TABLET ORAL at 20:07

## 2022-11-22 RX ADMIN — AMLODIPINE BESYLATE 5 MG: 5 TABLET ORAL at 08:26

## 2022-11-22 NOTE — ASSESSMENT & PLAN NOTE
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

## 2022-11-22 NOTE — PLAN OF CARE
Problem: DISCHARGE PLANNING  Goal: Discharge to home or other facility with appropriate resources  Description: INTERVENTIONS:  - Identify barriers to discharge w/patient and caregiver  - Arrange for needed discharge resources and transportation as appropriate  - Identify discharge learning needs (meds, wound care, etc )  - Arrange for interpretive services to assist at discharge as needed  - Refer to Case Management Department for coordinating discharge planning if the patient needs post-hospital services based on physician/advanced practitioner order or complex needs related to functional status, cognitive ability, or social support system  Outcome: Progressing     Problem: GENITOURINARY - ADULT  Goal: Maintains or returns to baseline urinary function  Description: INTERVENTIONS:  - Assess urinary function  - Encourage oral fluids to ensure adequate hydration if ordered  - Administer IV fluids as ordered to ensure adequate hydration  - Administer ordered medications as needed  - Offer frequent toileting  - Follow urinary retention protocol if ordered  Outcome: Progressing

## 2022-11-22 NOTE — ASSESSMENT & PLAN NOTE
Recent Labs     11/20/22  1434 11/21/22  0550 11/22/22  0636   * 80* 20   * 130* 80*   ALKPHOS 250* 195* 169*   TBILI 5 37* 5 45* 1 12*     EtOH: No  Drug use: No  Acetaminophen: On occasion     Imaging  · CT Abd/Pev: There are gallstone(s) within the gallbladder, without pericholecystic inflammatory changes  No biliary dilatation  · U/S: gallbladder is normal with no wall thickening or pericholecystic fluid  Gallstones noted  No sonographic Lay sign  There is a cholestatic / mixed pattern in terms of the transaminitis  Hepatitis panel negative, GGT elevated but consistent with cholestatic disease  LFTs continue to trend downward, total bilirubin down from 5 45 to 1 12 today  MRCP showed small gall stone near ampulla and multiple in gall bladder, but no acute obstructions or acute findings  Patient is non-tender on exam and has no complaints at this time      Plan:  · Continue to trend LFTs  · GI following, recommending one more day of LFT trending  · Gen surg following, recommending ERCP vs  Lap regis tomorrow  · NPO   · IVF NS 75 mL/hr

## 2022-11-22 NOTE — ASSESSMENT & PLAN NOTE
- CT renal stone study showing persistent markedly dilated left renal pelvis despite the presence of a left ureteral stent  This is similar in caliber to the prior CT  Correlate clinically to exclude obstructed stent  - Patient s/p UPJ stent '21 with recent exchange in August and was supposed to have another exchange this month but did not follow up  - Left CVA tenderness on examination    Plan  - Urology exchanged stent successfully yesterday, patient reporting improvement in urine color and quality  Will follow up upon discharge

## 2022-11-22 NOTE — PROGRESS NOTES
Progress Note - Xin Ventura 66 y o  female MRN: [id number]    Unit/Bed#: S -01 Encounter: [id number]    Assessment and Plan:     1 Elevated liver enzymes- presents on admission with symptoms such as chills, nausea, lower abdominal pain  No vomiting, diarrhea, right upper quadrant pain after meals  On admission lab work remarkable for:    · Elevated liver enzymes , ALT  231, alk phos 250, total bilirubin 5 37, direct bilirubin 1 67, gmma GT elevated 112  · Right upper quadrant ultrasound showed cholelithiasis without signs of cholecystitis  · CT renal stone study showed no clinically abnormality of the liver  There were noted gallstones without pericholecystic inflammatory changes, no biliary dilation  Patient underwent MRCP pending results, might require ERCP if obstruction noted  As of today's lab work liver enzymes are trending down:   AST back to normal 20, ALT 80, alk-phos 169,  visible improvement of the total bilirubin 1  12  Acute hepatitis panel normal     Plan:  · Pending MRI results  · Follow-up on RU and david 1 antitrypsin results    2  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- underwent yesterday stent exchange, was noted severe hydronephrosis with blunting of the calices  Urology following  3  Urinary tract infection-continue treatment per primary team  4 Essential hypertension      ----------------------------------------------------------------------------------------------------------------    Subjective:   Patient reports no overnight events, denies any complains  Reports improvement in the appetite,  could tolerate breakfast   Abdominal discomfort resolved  Objective:     Vitals: Blood pressure 129/69, pulse 78, temperature 98 °F (36 7 °C), temperature source Oral, resp  rate 15, SpO2 96 %  ,There is no height or weight on file to calculate BMI        Intake/Output Summary (Last 24 hours) at 11/22/2022 0967  Last data filed at 11/22/2022 0440  Gross per 24 hour Intake 400 ml   Output 400 ml   Net 0 ml       Physical Exam:     General Appearance: Alert, appears stated age and cooperative, jaundice with improvement  Lungs: Clear to auscultation bilaterally, no rales or rhonchi, no labored breathing/accessory muscle use  Heart: Regular rate and rhythm, S1, S2 normal, no murmur, click, rub or gallop  Abdomen: Soft, non-tender, non-distended; bowel sounds normal; no masses or no organomegaly  Extremities: No cyanosis, clubbing, or edema    Invasive Devices     Peripheral Intravenous Line  Duration           Peripheral IV 11/20/22 Left;Ventral (anterior) Forearm 1 day          Drain  Duration           Ureteral Internal Stent Left ureter 6 Fr  <1 day                Lab Results:  Results from last 7 days   Lab Units 11/21/22  0550   WBC Thousand/uL 5 29   HEMOGLOBIN g/dL 11 2*   HEMATOCRIT % 35 4   PLATELETS Thousands/uL 167   NEUTROS PCT % 71   LYMPHS PCT % 14   MONOS PCT % 13*   EOS PCT % 1     Results from last 7 days   Lab Units 11/22/22  0636   POTASSIUM mmol/L 4 3   CHLORIDE mmol/L 110*   CO2 mmol/L 21   BUN mg/dL 20   CREATININE mg/dL 0 63   CALCIUM mg/dL 8 3*   ALK PHOS U/L 169*   ALT U/L 80*   AST U/L 20     Invalid input(s): BILI  Results from last 7 days   Lab Units 11/20/22  1434   INR  1 07     Results from last 7 days   Lab Units 11/20/22  1434   LIPASE u/L 15       Imaging Studies: I have personally reviewed pertinent imaging studies  FL retrograde pyelogram    Result Date: 11/22/2022  Impression: Fluoroscopic guidance provided for left retrograde pyelogram  Please see procedure report for further details  Workstation performed: [id number]     CT renal stone study abdomen pelvis without contrast    Result Date: 11/20/2022  Impression: 1  Persistent markedly dilated left renal pelvis despite the presence of a left ureteral stent  This is similar in caliber to the prior CT  Correlate clinically to exclude obstructed stent   Workstation performed: [id number]      right upper quadrant    Result Date: 11/20/2022  Impression: 1  Cholelithiasis   Workstation performed: [id number]

## 2022-11-22 NOTE — PROGRESS NOTES
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   - Patient undergoing stent management every 3-12 months per Urology  - Patient presenting to ED complaining of lower abdominal/pelvic tenderness described as 7/10 dull pain/pressure; similar to her prior UTI symptoms with cloudy urine, foul smell, chills, nausea starting 2 days ago  - Patient afebrile on admission with no leukocytosis  - UA in ED showing +nitrites, +WBC, no bacteria  Blood cultures show no growth, urine cultures + for E  Coli  Plan  Ceftriaxone 1g qd day 2/7  IVF NS 75 mL/hr   CBC, CMP am      Hydronephrosis 2/2 L UPJ Obstruction  Assessment & Plan  - CT renal stone study showing persistent markedly dilated left renal pelvis despite the presence of a left ureteral stent  This is similar in caliber to the prior CT  Correlate clinically to exclude obstructed stent  - Patient s/p UPJ stent '21 with recent exchange in August and was supposed to have another exchange this month but did not follow up  - Left CVA tenderness on examination    Plan  - Urology exchanged stent successfully yesterday, patient reporting improvement in urine color and quality  Will follow up upon discharge  * Transaminitis  Assessment & Plan  Recent Labs     11/20/22  1434 11/21/22  0550 11/22/22  0636   * 80* 20   * 130* 80*   ALKPHOS 250* 195* 169*   TBILI 5 37* 5 45* 1 12*     EtOH: No  Drug use: No  Acetaminophen: On occasion     Imaging  · CT Abd/Pev: There are gallstone(s) within the gallbladder, without pericholecystic inflammatory changes  No biliary dilatation     · U/S: gallbladder is normal with no wall thickening or pericholecystic fluid  Gallstones noted  No sonographic Lay sign  There is a cholestatic / mixed pattern in terms of the transaminitis  Hepatitis panel negative, GGT elevated but consistent with cholestatic disease  LFTs continue to trend downward, total bilirubin down from 5 45 to 1 12 today  MRCP showed small gall stone near ampulla and multiple in gall bladder, but no acute obstructions or acute findings  Patient is non-tender on exam and has no complaints at this time  Plan:  · Continue to trend LFTs  · GI following, recommending one more day of LFT trending  · Gen surg following, recommending ERCP vs  Lap regis tomorrow  · NPO   · IVF NS 75 mL/hr      Essential hypertension  Assessment & Plan  Plan:  Continue amlodipine 5 mg BID  Continue metoprolol 25 mg BID         VTE Pharmacologic Prophylaxis: VTE Score: 4 Moderate Risk (Score 3-4) - Pharmacological DVT Prophylaxis Ordered: enoxaparin (Lovenox)  Patient Centered Rounds: I performed bedside rounds with nursing staff today  Discussions with Specialists or Other Care Team Provider: GI and Gen Surg - will trend LFT and Bili one more day to decide ERCP vs  Lap Regis  Education and Discussions with Family / Patient: Patient declined call to   Current Length of Stay: 2 day(s)  Current Patient Status: Inpatient   Discharge Plan: Anticipate discharge tomorrow to home  Code Status: Level 1 - Full Code    Subjective:   Patient was examined at the bedside today, offers no questions, comments, or complaints  The patient was told of her MRCP results, and relayed that after her stent exchange she has noticed her urine is more clear, less foul smelling, and appears to be returning to normal  She also notes resolution of suprapubic pressure and tenderness       Objective:     Vitals:   Temp (24hrs), Av 9 °F (36 6 °C), Min:97 °F (36 1 °C), Max:98 7 °F (37 1 °C)    Temp:  [97 °F (36 1 °C)-98 7 °F (37 1 °C)] 98 °F (36 7 °C)  HR:  [66-78] 67  Resp:  [15-20] 18  BP: (112-144)/(52-69) 123/67  SpO2:  [87 %-99 %] 96 %  There is no height or weight on file to calculate BMI  Input and Output Summary (last 24 hours): Intake/Output Summary (Last 24 hours) at 11/22/2022 1646  Last data filed at 11/22/2022 1402  Gross per 24 hour   Intake 1400 ml   Output 400 ml   Net 1000 ml       Physical Exam:   Physical Exam  Vitals and nursing note reviewed  Constitutional:       General: She is not in acute distress  Appearance: Normal appearance  She is well-developed and normal weight  She is not ill-appearing, toxic-appearing or diaphoretic  HENT:      Head: Normocephalic and atraumatic  Cardiovascular:      Rate and Rhythm: Normal rate and regular rhythm  Pulses: Normal pulses  Heart sounds: Normal heart sounds  No murmur heard  No friction rub  No gallop  Pulmonary:      Effort: Pulmonary effort is normal  No respiratory distress  Breath sounds: Normal breath sounds  No wheezing or rhonchi  Chest:      Chest wall: No tenderness  Abdominal:      General: Abdomen is flat  Bowel sounds are normal  There is no distension  Palpations: Abdomen is soft  There is no mass  Tenderness: There is no abdominal tenderness  There is no guarding  Musculoskeletal:         General: No swelling  Right lower leg: No edema  Left lower leg: No edema  Skin:     General: Skin is warm and dry  Capillary Refill: Capillary refill takes less than 2 seconds  Coloration: Skin is not jaundiced  Neurological:      General: No focal deficit present  Mental Status: She is alert and oriented to person, place, and time  Mental status is at baseline  Psychiatric:         Mood and Affect: Mood normal          Behavior: Behavior normal          Thought Content:  Thought content normal          Judgment: Judgment normal           Additional Data:     Labs:  Results from last 7 days   Lab Units 11/21/22  0550   WBC Thousand/uL 5 29   HEMOGLOBIN g/dL 11 2*   HEMATOCRIT % 35 4   PLATELETS Thousands/uL 167   NEUTROS PCT % 71   LYMPHS PCT % 14   MONOS PCT % 13*   EOS PCT % 1     Results from last 7 days   Lab Units 11/22/22  0636   SODIUM mmol/L 138   POTASSIUM mmol/L 4 3   CHLORIDE mmol/L 110*   CO2 mmol/L 21   BUN mg/dL 20   CREATININE mg/dL 0 63   ANION GAP mmol/L 7   CALCIUM mg/dL 8 3*   ALBUMIN g/dL 3 2*   TOTAL BILIRUBIN mg/dL 1 12*   ALK PHOS U/L 169*   ALT U/L 80*   AST U/L 20   GLUCOSE RANDOM mg/dL 218*     Results from last 7 days   Lab Units 11/20/22  1434   INR  1 07             Results from last 7 days   Lab Units 11/20/22  1434   LACTIC ACID mmol/L 1 2       Lines/Drains:  Invasive Devices     Peripheral Intravenous Line  Duration           Peripheral IV 11/20/22 Left;Ventral (anterior) Forearm 2 days          Drain  Duration           Ureteral Internal Stent Left ureter 6 Fr  <1 day                      Imaging: Reviewed radiology reports from this admission including: MRI abdomen/MRCP  MRI abdomen/MRCP:  Study Result    Narrative & Impression   MRI OF THE ABDOMEN WITHOUT CONTRAST WITH MRCP     INDICATION: 66 years / Female  elevated Tbili, MRCP  Dr Christelle Barnes note from 11/21/2022 was also reviewed for additional history  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, and underwent left ureteral stent placement on   11/21/2022       COMPARISON: Abdomen and pelvic CT from 11/20/2022      TECHNIQUE:  Multiplanar/multisequence MRI of the abdomen with 3D MRCP was performed without the administration of contrast      FINDINGS:     Exam moderately limited due to patient motion      LOWER CHEST:   Unremarkable      LIVER:  Normal in size and configuration  No suspicious mass  Limited evaluation of hepatic veins and portal veins on this non-contrast MRI is unremarkable      BILE DUCTS:  No intrahepatic or extrahepatic bile duct dilation    Common bile duct is normal in caliber at 2 mm, although there is a punctate common bile duct stone, located 2 cm proximal to the ampulla  (Series 8 image 6, and series 5 image 25 )      GALLBLADDER:  Numerous small gallstones in the gallbladder  There is no biliary ductal dilatation or pericholecystic inflammatory changes      PANCREAS:  Unremarkable      ADRENAL GLANDS:  Normal      SPLEEN:  Normal      KIDNEYS/PROXIMAL URETERS:  Again seen is evidence of left UPJ obstruction, with severe left hydronephrosis, and ureteral stent in place  No suspicious renal mass      BOWEL:  No dilated loops of bowel       PERITONEAL CAVITY/RETROPERITONEUM:  No ascites  No mass       LYMPH NODES:  No abdominal lymphadenopathy      VASCULAR STRUCTURES:  Unremarkable  No aneurysm      ABDOMINAL WALL:  Unremarkable      OSSEOUS STRUCTURES:  No suspicious osseous lesion         IMPRESSION:     There is no biliary ductal dilatation although there is a punctate common bile duct stone, located 2 cm proximal to the ampulla  (Series 8 image 6, and series 5 image 25 )     Numerous small gallstones in the gallbladder without evidence of acute cholecystitis  Recent Cultures (last 7 days):   Results from last 7 days   Lab Units 11/20/22  1458 11/20/22  1419   BLOOD CULTURE  No Growth at 24 hrs    No Growth at 24 hrs   --    URINE CULTURE   --  >100,000 cfu/ml Escherichia coli*       Last 24 Hours Medication List:   Current Facility-Administered Medications   Medication Dose Route Frequency Provider Last Rate   • amLODIPine  5 mg Oral BID Ponce Cheek MD     • calcium carbonate  500 mg Oral BID PRN Tristin Pritchett DO     • cefTRIAXone  1,000 mg Intravenous Q24H Ponce Cheek MD 1,000 mg (11/22/22 1404)   • enoxaparin  40 mg Subcutaneous Q24H [address] Ponce Cheek MD     • metoprolol succinate  25 mg Oral BID Ponce Cheek MD     • morphine injection  2 mg Intravenous Q4H PRN Ponce Cheek MD     • oxyCODONE 2 5 mg Oral Q4H PRN Roland Mobley MD     • oxyCODONE  5 mg Oral Q4H PRN Roland Mobley MD     • sodium chloride  75 mL/hr Intravenous Continuous Roland Mobley MD 75 mL/hr (11/22/22 1402)        Today, Patient Was Seen By: Darren Brown DO    **Please Note: This note may have been constructed using a voice recognition system  **

## 2022-11-23 ENCOUNTER — APPOINTMENT (INPATIENT)
Dept: GASTROENTEROLOGY | Facility: HOSPITAL | Age: 72
End: 2022-11-23

## 2022-11-23 VITALS
SYSTOLIC BLOOD PRESSURE: 131 MMHG | TEMPERATURE: 98.2 F | OXYGEN SATURATION: 93 % | RESPIRATION RATE: 18 BRPM | HEART RATE: 60 BPM | DIASTOLIC BLOOD PRESSURE: 68 MMHG

## 2022-11-23 PROBLEM — R74.01 TRANSAMINITIS: Status: RESOLVED | Noted: 2022-11-20 | Resolved: 2022-11-23

## 2022-11-23 PROBLEM — N13.30 HYDRONEPHROSIS: Status: RESOLVED | Noted: 2022-08-21 | Resolved: 2022-11-23

## 2022-11-23 LAB
ALBUMIN SERPL BCP-MCNC: 3.1 G/DL (ref 3.5–5)
ALP SERPL-CCNC: 145 U/L (ref 34–104)
ALT SERPL W P-5'-P-CCNC: 54 U/L (ref 7–52)
ANION GAP SERPL CALCULATED.3IONS-SCNC: 7 MMOL/L (ref 4–13)
AST SERPL W P-5'-P-CCNC: 10 U/L (ref 13–39)
BASOPHILS # BLD AUTO: 0.02 THOUSANDS/ÂΜL (ref 0–0.1)
BASOPHILS NFR BLD AUTO: 0 % (ref 0–1)
BILIRUB SERPL-MCNC: 0.74 MG/DL (ref 0.2–1)
BUN SERPL-MCNC: 17 MG/DL (ref 5–25)
CALCIUM ALBUM COR SERPL-MCNC: 9.1 MG/DL (ref 8.3–10.1)
CALCIUM SERPL-MCNC: 8.4 MG/DL (ref 8.4–10.2)
CHLORIDE SERPL-SCNC: 113 MMOL/L (ref 96–108)
CO2 SERPL-SCNC: 22 MMOL/L (ref 21–32)
CREAT SERPL-MCNC: 0.59 MG/DL (ref 0.6–1.3)
EOSINOPHIL # BLD AUTO: 0.02 THOUSAND/ÂΜL (ref 0–0.61)
EOSINOPHIL NFR BLD AUTO: 0 % (ref 0–6)
ERYTHROCYTE [DISTWIDTH] IN BLOOD BY AUTOMATED COUNT: 12.9 % (ref 11.6–15.1)
GFR SERPL CREATININE-BSD FRML MDRD: 91 ML/MIN/1.73SQ M
GLUCOSE SERPL-MCNC: 122 MG/DL (ref 65–140)
HCT VFR BLD AUTO: 32.6 % (ref 34.8–46.1)
HGB BLD-MCNC: 10.4 G/DL (ref 11.5–15.4)
IMM GRANULOCYTES # BLD AUTO: 0.06 THOUSAND/UL (ref 0–0.2)
IMM GRANULOCYTES NFR BLD AUTO: 1 % (ref 0–2)
LYMPHOCYTES # BLD AUTO: 1 THOUSANDS/ÂΜL (ref 0.6–4.47)
LYMPHOCYTES NFR BLD AUTO: 15 % (ref 14–44)
MCH RBC QN AUTO: 31.3 PG (ref 26.8–34.3)
MCHC RBC AUTO-ENTMCNC: 31.9 G/DL (ref 31.4–37.4)
MCV RBC AUTO: 98 FL (ref 82–98)
MONOCYTES # BLD AUTO: 0.81 THOUSAND/ÂΜL (ref 0.17–1.22)
MONOCYTES NFR BLD AUTO: 12 % (ref 4–12)
NEUTROPHILS # BLD AUTO: 4.83 THOUSANDS/ÂΜL (ref 1.85–7.62)
NEUTS SEG NFR BLD AUTO: 72 % (ref 43–75)
NRBC BLD AUTO-RTO: 0 /100 WBCS
PLATELET # BLD AUTO: 215 THOUSANDS/UL (ref 149–390)
PMV BLD AUTO: 9.9 FL (ref 8.9–12.7)
POTASSIUM SERPL-SCNC: 3.8 MMOL/L (ref 3.5–5.3)
PROT SERPL-MCNC: 5.5 G/DL (ref 6.4–8.4)
RBC # BLD AUTO: 3.32 MILLION/UL (ref 3.81–5.12)
SODIUM SERPL-SCNC: 142 MMOL/L (ref 135–147)
WBC # BLD AUTO: 6.74 THOUSAND/UL (ref 4.31–10.16)

## 2022-11-23 RX ORDER — CEPHALEXIN 500 MG/1
500 CAPSULE ORAL EVERY 6 HOURS SCHEDULED
Qty: 28 CAPSULE | Refills: 0 | Status: SHIPPED | OUTPATIENT
Start: 2022-11-23 | End: 2022-11-30

## 2022-11-23 RX ADMIN — METOPROLOL SUCCINATE 25 MG: 25 TABLET, EXTENDED RELEASE ORAL at 08:46

## 2022-11-23 RX ADMIN — AMLODIPINE BESYLATE 5 MG: 5 TABLET ORAL at 08:46

## 2022-11-23 RX ADMIN — SODIUM CHLORIDE 75 ML/HR: 0.9 INJECTION, SOLUTION INTRAVENOUS at 04:37

## 2022-11-23 NOTE — PLAN OF CARE
Problem: Potential for Falls  Goal: Patient will remain free of falls  Description: INTERVENTIONS:  - Educate patient/family on patient safety including physical limitations  - Instruct patient to call for assistance with activity   - Consult OT/PT to assist with strengthening/mobility   - Keep Call bell within reach  - Keep bed low and locked with side rails adjusted as appropriate  - Keep care items and personal belongings within reach  - Initiate and maintain comfort rounds  - Make Fall Risk Sign visible to staff  - Offer Toileting every  Hours, in advance of need  - Initiate/Maintain alarm  - Obtain necessary fall risk management equipment:  - Apply yellow socks and bracelet for high fall risk patients  - Consider moving patient to room near nurses station  Outcome: Adequate for Discharge     Problem: INFECTION - ADULT  Goal: Absence of fever/infection during neutropenic period  Description: INTERVENTIONS:  - Monitor WBC    Outcome: Adequate for Discharge     Problem: DISCHARGE PLANNING  Goal: Discharge to home or other facility with appropriate resources  Description: INTERVENTIONS:  - Identify barriers to discharge w/patient and caregiver  - Arrange for needed discharge resources and transportation as appropriate  - Identify discharge learning needs (meds, wound care, etc )  - Arrange for interpretive services to assist at discharge as needed  - Refer to Case Management Department for coordinating discharge planning if the patient needs post-hospital services based on physician/advanced practitioner order or complex needs related to functional status, cognitive ability, or social support system  Outcome: Adequate for Discharge     Problem: GENITOURINARY - ADULT  Goal: Maintains or returns to baseline urinary function  Description: INTERVENTIONS:  - Assess urinary function  - Encourage oral fluids to ensure adequate hydration if ordered  - Administer IV fluids as ordered to ensure adequate hydration  - Administer ordered medications as needed  - Offer frequent toileting  - Follow urinary retention protocol if ordered  Outcome: Adequate for Discharge

## 2022-11-23 NOTE — NURSING NOTE
Pt  upset the that she may possibly need an ERCP tomorrow  She came in with elevated liver enzymes and abdominal discomfort  States that doctor told her the MRCP today would show enough  PT is adamant she is going home for thanksgiving  Dr Maryam Reyes aware and called into patient   Pt yelling at this writer about lack of testing while trying to arrange phone call etc

## 2022-11-23 NOTE — ASSESSMENT & PLAN NOTE
Recent Labs     11/21/22  0550 11/22/22  0636 11/23/22  0439   AST 80* 20 10*   * 80* 54*   ALKPHOS 195* 169* 145*   TBILI 5 45* 1 12* 0 74     EtOH: No  Drug use: No  Acetaminophen: On occasion     Imaging  · CT Abd/Pev: There are gallstone(s) within the gallbladder, without pericholecystic inflammatory changes  No biliary dilatation  · U/S: gallbladder is normal with no wall thickening or pericholecystic fluid  Gallstones noted  No sonographic Lay sign  There is a cholestatic / mixed pattern in terms of the transaminitis  Hepatitis panel negative, GGT elevated but consistent with cholestatic disease  LFTs continue to trend downward, total bilirubin down from 5 45 to 1 12 today  MRCP showed small gall stone near ampulla and multiple in gall bladder, but no acute obstructions or acute findings  Patient is non-tender on exam and has no complaints at this time  Plan:  · LFT's and Bili trending down/normal   · Gen surg has agreed that patient may follow up outpatient for lap regis

## 2022-11-23 NOTE — PROGRESS NOTES
Progress Note - General Surgery   Carlos Hernandez 66 y o  female MRN: [id number]  Unit/Bed#: S -01 Encounter: [id number]    Assessment:  Cholelithiasis/choledocholithiasis    Tbili 0 7 (1 1)    Plan:  • Will follow-up with GI regarding plan for possible ERCP today  • Discussed with patient that our recommendation would be for laparoscopic cholecystectomy this admission (currently on OR board for 10:30am), however she is adamant that she return home today  • If patient does not want laparoscopic cholecystectomy this admission, will need close outpatient follow-up for elective cholecystectomy    Subjective/Objective     Subjective:   No acute events overnight  Wants to go home today  Objective:    Blood pressure 131/68, pulse 60, temperature 98 2 °F (36 8 °C), temperature source Oral, resp  rate 18, SpO2 93 %  ,There is no height or weight on file to calculate BMI        Intake/Output Summary (Last 24 hours) at 11/23/2022 0752  Last data filed at 11/23/2022 0437  Gross per 24 hour   Intake 1980 ml   Output --   Net 1980 ml       Invasive Devices     Peripheral Intravenous Line  Duration           Peripheral IV 11/22/22 Right;Ventral (anterior) Forearm <1 day          Drain  Duration           Ureteral Internal Stent Left ureter 6 Fr  1 day                Physical Exam:   Gen:  NAD  CV:  warm, well-perfused  Lungs: nl effort  Abd:  soft, NT/ND  Ext:  no CCE  Neuro: A&Ox3     Results from last 7 days   Lab Units 11/23/22  0439 11/21/22  0550 11/20/22  2046 11/20/22  1434   WBC Thousand/uL 6 74 5 29  --  7 14   HEMOGLOBIN g/dL 10 4* 11 2*  --  13 2   HEMATOCRIT % 32 6* 35 4  --  41 2   PLATELETS Thousands/uL 215 167 167 203     Results from last 7 days   Lab Units 11/23/22  0439 11/22/22  0636 11/21/22  0550   POTASSIUM mmol/L 3 8 4 3 4 1   CHLORIDE mmol/L 113* 110* 109*   CO2 mmol/L 22 21 23   BUN mg/dL 17 20 12   CREATININE mg/dL 0 59* 0 63 0 72   CALCIUM mg/dL 8 4 8 3* 8 6     Results from last 7 days   Lab Units 11/20/22  1434   INR  1 07   PTT seconds 27

## 2022-11-23 NOTE — PROGRESS NOTES
Progress Note - Kacy April 66 y o  female MRN: [id number]    Unit/Bed#: S -19 Encounter: [id number]    Assessment and Plan:     1  Elevated liver enzymes  Total bilirubin has completely resolved from 5 on admission to 0 74 today  Alkaline phosphatase also decreasing to 145  Suspect in the setting of past stone that was seen on MRI prior to liver enzymes down trending  Patient recommended for cholecystectomy which she would like to have done in the outpatient setting due to the holiday tomorrow  Her abdominal exam is benign and she is not having any pain     -discussed with patient that her liver enzyme tests are significantly improving with bilirubin completely resolving likely in the setting of passed stone  She would be at high risk for developing complication after ERCP such as pancreatitis in the setting of normal/small CBD  -patient would like to go home  She reports discussing with surgery and will likely have cholecystectomy on Monday   -patient was recommended low-fat diet  -sent message to surgery to discuss ERCP however have not heard back yet    ----------------------------------------------------------------------------------------------------------------    Subjective:     Patient reports she is doing well and has no complaints  She reports she is looking for to going home  Reports she had discussion with surgery today and will likely come back on Monday for laparoscopic cholecystectomy  She reports being told by surgery that ERCP is no longer needed  Objective:     Vitals: Blood pressure 131/68, pulse 60, temperature 98 2 °F (36 8 °C), temperature source Oral, resp  rate 18, SpO2 93 %  ,There is no height or weight on file to calculate BMI        Intake/Output Summary (Last 24 hours) at 11/23/2022 0946  Last data filed at 11/23/2022 0850  Gross per 24 hour   Intake 2296 25 ml   Output --   Net 2296 25 ml       Physical Exam:     General Appearance: Alert, appears stated age and cooperative  Lungs: Clear to auscultation bilaterally, no rales or rhonchi, no labored breathing/accessory muscle use  Heart: Regular rate and rhythm, S1, S2 normal, no murmur, click, rub or gallop  Abdomen: Soft, non-tender, non-distended; bowel sounds normal; no masses or no organomegaly  Extremities: No cyanosis, clubbing, or edema    Invasive Devices     Peripheral Intravenous Line  Duration           Peripheral IV 11/22/22 Right;Ventral (anterior) Forearm <1 day          Drain  Duration           Ureteral Internal Stent Left ureter 6 Fr  1 day                Lab Results:  Results from last 7 days   Lab Units 11/23/22  0439   WBC Thousand/uL 6 74   HEMOGLOBIN g/dL 10 4*   HEMATOCRIT % 32 6*   PLATELETS Thousands/uL 215   NEUTROS PCT % 72   LYMPHS PCT % 15   MONOS PCT % 12   EOS PCT % 0     Results from last 7 days   Lab Units 11/23/22  0439   POTASSIUM mmol/L 3 8   CHLORIDE mmol/L 113*   CO2 mmol/L 22   BUN mg/dL 17   CREATININE mg/dL 0 59*   CALCIUM mg/dL 8 4   ALK PHOS U/L 145*   ALT U/L 54*   AST U/L 10*     Invalid input(s): BILI  Results from last 7 days   Lab Units 11/20/22  1434   INR  1 07     Results from last 7 days   Lab Units 11/20/22  1434   LIPASE u/L 15       Imaging Studies: I have personally reviewed pertinent imaging studies  FL retrograde pyelogram    Result Date: 11/22/2022  Impression: Fluoroscopic guidance provided for left retrograde pyelogram  Please see procedure report for further details  Workstation performed: [id number]     MRI abdomen wo contrast and mrcp    Result Date: 11/22/2022  Impression: There is no biliary ductal dilatation although there is a punctate common bile duct stone, located 2 cm proximal to the ampulla  (Series 8 image 6, and series 5 image 25 ) Numerous small gallstones in the gallbladder without evidence of acute cholecystitis    I personally discussed this study with Dr Lyndsey Knapp on 11/22/2022 at 9:52 AM  Workstation performed: [id number]     CT renal stone study abdomen pelvis without contrast    Result Date: 11/20/2022  Impression: 1  Persistent markedly dilated left renal pelvis despite the presence of a left ureteral stent  This is similar in caliber to the prior CT  Correlate clinically to exclude obstructed stent  Workstation performed: [id number]     US right upper quadrant    Result Date: 11/20/2022  Impression: 1  Cholelithiasis   Workstation performed: [id number]

## 2022-11-23 NOTE — DISCHARGE INSTR - AVS FIRST PAGE
Dear Hailey Silva,     It was our pleasure to care for you here at [facility]  It is our hope that we were always able to exceed the expected standards for your care during your stay  You were hospitalized due to urinary tract infection  You were cared for on the [address] [facility] floor by Arron Ya DO under the service of Bj Newell DO with the [address] [place] [address] [facility] who covers for your primary care physician (PCP), Shobha Amador DO, while you were hospitalized  If you have any questions or concerns related to this hospitalization, you may contact us at [phone]  For follow up as well as any medication refills, we recommend that you follow up with your primary care physician  A registered nurse will reach out to you by phone within a few days after your discharge to answer any additional questions that you may have after going home  However, at this time we provide for you here, the most important instructions / recommendations at discharge:     Notable Medication Adjustments -   Please continue Antibiotic regimen for UTI treatment  Please take Keflex 500mg every 6 hours for the next seven days  Testing Required after Discharge -   None  Important follow up information -   Please follow up with your Urologist   Please follow up with outpatient General Surgery  Other Instructions -   We hope you feel better soon  If your symptoms worsen, please call 911 immediately or go to the nearest Emergency Department  Please review this entire after visit summary as additional general instructions including medication list, appointments, activity, diet, any pertinent wound care, and other additional recommendations from your care team that may be provided for you        Sincerely,     Arron Ya DO

## 2022-11-23 NOTE — DISCHARGE SUMMARY
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   - Patient undergoing stent management every 3-12 months per Urology  - Patient presenting to ED complaining of lower abdominal/pelvic tenderness described as 7/10 dull pain/pressure; similar to her prior UTI symptoms with cloudy urine, foul smell, chills, nausea starting 2 days ago  - Patient afebrile on admission with no leukocytosis  - UA in ED showing +nitrites, +WBC, no bacteria  Blood cultures show no growth, urine cultures + for E  Coli  Plan  Discharge with Keflex, this is day 3/10, will DC with 7 day supply  DC IV fluids  Hydronephrosis 2/2 L UPJ Obstruction  Assessment & Plan  - CT renal stone study showing persistent markedly dilated left renal pelvis despite the presence of a left ureteral stent  This is similar in caliber to the prior CT  Correlate clinically to exclude obstructed stent  - Patient s/p UPJ stent '21 with recent exchange in August and was supposed to have another exchange this month but did not follow up  - No CVA tenderness or suprapubic pressure    Plan  - Urology exchanged stent successfully, patient reporting improvement in urine color and quality  Will follow up upon discharge  * Transaminitis  Assessment & Plan  Recent Labs     11/21/22  0550 11/22/22  0636 11/23/22  0439   AST 80* 20 10*   * 80* 54*   ALKPHOS 195* 169* 145*   TBILI 5 45* 1 12* 0 74     EtOH: No  Drug use: No  Acetaminophen: On occasion     Imaging  · CT Abd/Pev: There are gallstone(s) within the gallbladder, without pericholecystic inflammatory changes  No biliary dilatation  · U/S: gallbladder is normal with no wall thickening or pericholecystic fluid  Gallstones noted  No sonographic Lay sign  There is a cholestatic / mixed pattern in terms of the transaminitis  Hepatitis panel negative, GGT elevated but consistent with cholestatic disease  LFTs continue to trend downward, total bilirubin down from 5 45 to 1 12 today  MRCP showed small gall stone near ampulla and multiple in gall bladder, but no acute obstructions or acute findings  Patient is non-tender on exam and has no complaints at this time  Plan:  · LFT's and Bili trending down/normal   · Gen surg has agreed that patient may follow up outpatient for lap regis  Essential hypertension  Assessment & Plan  Plan:  Continue amlodipine 5 mg BID  Continue metoprolol 25 mg BID         Medical Problems     Resolved Problems  Date Reviewed: 11/21/2022   None       Discharging Resident: J Luis Robles DO  Discharging Attending: Usman Martinez DO  PCP: Goyo Ji DO  Admission Date: 11/20/22  Admission Orders (From admission, onward)     Ordered        11/20/22 1844  INPATIENT ADMISSION  Once                      Discharge Date: 11/23/22    Consultations During Hospital Stay:  · General Surgery - evaluation of need for Lap  Cholecystectomy  · Gastroenterology - evaluation of MRCP and need for ERCP for elevated liver enzymes and bilirubin  · Urology - evaluation of hydronephrosis and UPJ obstruction and left ureteral stent exchange  Procedures Performed:   · FL retrograde pyelogram with left ureteral stent exchange    Significant Findings / Test Results:   FL retrograde pyelogram    Result Date: 11/22/2022  Impression: Fluoroscopic guidance provided for left retrograde pyelogram  Please see procedure report for further details   Workstation performed: [id number]     MRI abdomen wo contrast and mrcp    Result Date: 11/22/2022  Impression: There is no biliary ductal dilatation although there is a punctate common bile duct stone, located 2 cm proximal to the ampulla  (Series 8 image 6, and series 5 image 25 ) Numerous small gallstones in the gallbladder without evidence of acute cholecystitis  I personally discussed this study with Dr Georgina Galarza on 11/22/2022 at 9:52 AM  Workstation performed: [id number]     CT renal stone study abdomen pelvis without contrast    Result Date: 11/20/2022  Impression: 1  Persistent markedly dilated left renal pelvis despite the presence of a left ureteral stent  This is similar in caliber to the prior CT  Correlate clinically to exclude obstructed stent  Workstation performed: [id number]     US right upper quadrant    Result Date: 11/20/2022  · Impression: 1  Cholelithiasis  Workstation performed: [id number]     Incidental Findings:   · Punctate common bile duct stone 2cm from ampulla, numerous small gallstones in gallbladder but no acute cholecystitis or duct dilation  · I reviewed the above mentioned incidental findings with the patient and/or family and they expressed understanding  Test Results Pending at Discharge (will require follow up): · None     Outpatient Tests Requested:  · None    Complications:  None    Reason for Admission: Suprapubic pressure with dark, foul smelling urine, UTI  Hospital Course:   Gaurav Aly is a 66 y o  female with a past medical history significant for chronic left-sided hydronephrosis and UPJ obstruction s/p stenting, stage 2 CKD, GERD, and HTN  who originally presented to the hospital on 11/20/2022 due to suprapubic pressure and pain on palpation accompanied by foul-smelling, dark urine and nausea with chills  The patient had last been seen by Urology for stent exchange in August and was due this month for another stent exchange per her Urologist but she wanted to push back this exchange   On admission she was found to have dark, cloudy urine, and rocephin was started, and she also was found to have elevated liver enzymes and bilirubin at which time GI and General Surgery were consulted along with Urology  Urology performed a stent exchange and the patient's symptoms resolved and she was monitored by Primary team, GI, and surgery as her LFT's remained down trending, she underwent MRCP which showed gallstones but no duct dilation or acute cholecystitis and on the day of discharge the patient's bilirubin returned to normal and LFT's continued to downtrend at which time treatment was discussed and it was agreed that the patient no longer was in need of ERCP and that she may follow up for lap  Kelsey as an outpatient  The patient was subsequently discharged with the remained of her Abx course switched to Keflex  The patient, initially admitted to the hospital as inpatient, was discharged earlier than expected given the following: downtrending LFT's and normalizing bilirubin along with MRCP that revealed she does not currently need ERCP and she wished to follow up with surgery outpatinet       Please see above list of diagnoses and related plan for additional information  Condition at Discharge: fair    Discharge Day Visit / Exam:   Subjective: The patient was examined at the bedside and did not appear to be in any acute distress  The patient stated her urine has returned to her normal color and is no longer cloudy or foul-smelling and she is no longer experiencing urgency or suprapubic pressure  The patient has no GI complaints or abdominal tenderness, is passing gas appropriately, and expressed that she wishes to go home and follow up with surgery at a later time as an outpatient  The patient has no other questions, comments, or concerns at this time  Vitals: Blood Pressure: 131/68 (11/23/22 0745)  Pulse: 60 (11/23/22 0745)  Temperature: 98 2 °F (36 8 °C) (11/22/22 2253)  Temp Source: Oral (11/22/22 2253)  Respirations: 18 (11/23/22 0745)  SpO2: 93 % (11/23/22 0745)  Exam:   Physical Exam  Vitals and nursing note reviewed  Constitutional:       General: She is not in acute distress  Appearance: Normal appearance  She is well-developed and normal weight  She is not ill-appearing, toxic-appearing or diaphoretic  HENT:      Head: Normocephalic and atraumatic  Eyes:      Conjunctiva/sclera: Conjunctivae normal    Cardiovascular:      Rate and Rhythm: Normal rate and regular rhythm  Pulses: Normal pulses  Heart sounds: Normal heart sounds  No murmur heard  No friction rub  No gallop  Pulmonary:      Effort: Pulmonary effort is normal  No respiratory distress  Breath sounds: Normal breath sounds  No wheezing, rhonchi or rales  Chest:      Chest wall: No tenderness  Abdominal:      General: Abdomen is flat  Bowel sounds are normal  There is no distension  Palpations: Abdomen is soft  Tenderness: There is no abdominal tenderness  There is no right CVA tenderness, left CVA tenderness or guarding  Musculoskeletal:         General: No swelling  Right lower leg: No edema  Left lower leg: No edema  Skin:     General: Skin is warm and dry  Capillary Refill: Capillary refill takes less than 2 seconds  Coloration: Skin is not jaundiced or pale  Findings: No bruising or rash  Neurological:      General: No focal deficit present  Mental Status: She is alert and oriented to person, place, and time  Mental status is at baseline  Psychiatric:         Mood and Affect: Mood normal          Behavior: Behavior normal          Thought Content: Thought content normal          Judgment: Judgment normal           Discussion with Family: Patient declined call to   Discharge instructions/Information to patient and family:   See after visit summary for information provided to patient and family  Provisions for Follow-Up Care:  See after visit summary for information related to follow-up care and any pertinent home health orders         Disposition: Home    Planned Readmission: None    Discharge Medications:  See after visit summary for reconciled discharge medications provided to patient and/or family        **Please Note: This note may have been constructed using a voice recognition system**

## 2022-11-23 NOTE — ASSESSMENT & PLAN NOTE
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

## 2022-11-23 NOTE — PLAN OF CARE
Problem: Potential for Falls  Goal: Patient will remain free of falls  Description: INTERVENTIONS:  - Educate patient/family on patient safety including physical limitations  - Instruct patient to call for assistance with activity   - Consult OT/PT to assist with strengthening/mobility   - Keep Call bell within reach  - Keep bed low and locked with side rails adjusted as appropriate  - Keep care items and personal belongings within reach  - Initiate and maintain comfort rounds  - Make Fall Risk Sign visible to staff  - Apply yellow socks and bracelet for high fall risk patients  - Consider moving patient to room near nurses station  Outcome: Progressing     Problem: INFECTION - ADULT  Goal: Absence of fever/infection during neutropenic period  Description: INTERVENTIONS:  - Monitor WBC    Outcome: Progressing     Problem: DISCHARGE PLANNING  Goal: Discharge to home or other facility with appropriate resources  Description: INTERVENTIONS:  - Identify barriers to discharge w/patient and caregiver  - Arrange for needed discharge resources and transportation as appropriate  - Identify discharge learning needs (meds, wound care, etc )  - Arrange for interpretive services to assist at discharge as needed  - Refer to Case Management Department for coordinating discharge planning if the patient needs post-hospital services based on physician/advanced practitioner order or complex needs related to functional status, cognitive ability, or social support system  Outcome: Progressing     Problem: GENITOURINARY - ADULT  Goal: Maintains or returns to baseline urinary function  Description: INTERVENTIONS:  - Assess urinary function  - Encourage oral fluids to ensure adequate hydration if ordered  - Administer IV fluids as ordered to ensure adequate hydration  - Administer ordered medications as needed  - Offer frequent toileting  - Follow urinary retention protocol if ordered  Outcome: Progressing

## 2022-11-23 NOTE — PLAN OF CARE
Problem: Potential for Falls  Goal: Patient will remain free of falls  Description: INTERVENTIONS:  - Educate patient/family on patient safety including physical limitations  - Instruct patient to call for assistance with activity   - Consult OT/PT to assist with strengthening/mobility   - Keep Call bell within reach  - Keep bed low and locked with side rails adjusted as appropriate  - Keep care items and personal belongings within reach  - Initiate and maintain comfort rounds  - Make Fall Risk Sign visible to staff  - Offer Toileting every  Hours, in advance of need  - Initiate/Maintain alarm  - Obtain necessary fall risk management equipment  - Apply yellow socks and bracelet for high fall risk patients  - Consider moving patient to room near nurses station  11/23/2022 1021 by Garrison Schultz RN  Outcome: Completed  11/23/2022 0849 by Garrison Schultz RN  Outcome: Adequate for Discharge     Problem: INFECTION - ADULT  Goal: Absence of fever/infection during neutropenic period  Description: INTERVENTIONS:  - Monitor WBC    11/23/2022 1021 by Garrison Schultz RN  Outcome: Completed  11/23/2022 0849 by Garrison Schultz RN  Outcome: Adequate for Discharge     Problem: DISCHARGE PLANNING  Goal: Discharge to home or other facility with appropriate resources  Description: INTERVENTIONS:  - Identify barriers to discharge w/patient and caregiver  - Arrange for needed discharge resources and transportation as appropriate  - Identify discharge learning needs (meds, wound care, etc )  - Arrange for interpretive services to assist at discharge as needed  - Refer to Case Management Department for coordinating discharge planning if the patient needs post-hospital services based on physician/advanced practitioner order or complex needs related to functional status, cognitive ability, or social support system  11/23/2022 1021 by Garrison Schultz RN  Outcome: Completed  11/23/2022 0849 by Garrison Schultz RN  Outcome: Adequate for Discharge     Problem: GENITOURINARY - ADULT  Goal: Maintains or returns to baseline urinary function  Description: INTERVENTIONS:  - Assess urinary function  - Encourage oral fluids to ensure adequate hydration if ordered  - Administer IV fluids as ordered to ensure adequate hydration  - Administer ordered medications as needed  - Offer frequent toileting  - Follow urinary retention protocol if ordered  11/23/2022 1021 by Kierra Rao RN  Outcome: Completed  11/23/2022 0849 by Kierra Rao RN  Outcome: Adequate for Discharge

## 2022-11-23 NOTE — ASSESSMENT & PLAN NOTE
- CT renal stone study showing persistent markedly dilated left renal pelvis despite the presence of a left ureteral stent  This is similar in caliber to the prior CT  Correlate clinically to exclude obstructed stent  - Patient s/p UPJ stent '21 with recent exchange in August and was supposed to have another exchange this month but did not follow up  - No CVA tenderness or suprapubic pressure    Plan  - Urology exchanged stent successfully, patient reporting improvement in urine color and quality  Will follow up upon discharge

## 2022-11-25 LAB
BACTERIA BLD CULT: NORMAL
BACTERIA BLD CULT: NORMAL

## 2022-11-25 NOTE — TELEPHONE ENCOUNTER
I left detailed message on Dr Tiarra Pena answering machine advising as per dominique Adams PA-C:  Collin Gibbons PA-C  to [facility]    8:20 AM  Imaging showing known hydronephrosis due to UPJ obstruction which is managed with stent exchanges routinely, every 3 months  Last stent exchange was just 2 days ago  Recommend stent exchange as planned  Thanks     I left Urology call back number on machine for nurses to call out office with any questions  Thank you

## 2022-11-26 LAB — ANA SER QL IA: NEGATIVE

## 2022-11-28 LAB
A1AT PHENOTYP SERPL IFE: ABNORMAL
A1AT SERPL-MCNC: 194 MG/DL (ref 101–187)

## 2022-12-08 ENCOUNTER — TELEPHONE (OUTPATIENT)
Dept: SURGERY | Facility: CLINIC | Age: 72
End: 2022-12-08

## 2023-01-22 PROBLEM — N39.0 UTI (URINARY TRACT INFECTION): Status: RESOLVED | Noted: 2021-10-15 | Resolved: 2023-01-22

## 2023-01-23 ENCOUNTER — TELEPHONE (OUTPATIENT)
Dept: OTHER | Facility: OTHER | Age: 73
End: 2023-01-23

## 2023-01-23 NOTE — TELEPHONE ENCOUNTER
Call from patient questioning if Dr Erik Garner is able to use the EKG that was taken at [facility] for her procedure or if she needs to have another one taken  Please return her call

## 2023-01-24 NOTE — TELEPHONE ENCOUNTER
Сергей Lutz  You 2 minutes ago (11:52 AM)     TL  Already sent a request to [address] [place] [address] records to fax it over       [address]; [place] [facility] Larissa Wright 5 hours ago (6:49 AM)     Does she need the paper copy? Or will the record in care everywhere from [facility] be ok        Сергей Acosta 17 hours ago (6:23 PM)     YAO  Yes we can use 1/13/23 EKG      Advised patient of this   No further concerns

## 2023-01-27 ENCOUNTER — APPOINTMENT (OUTPATIENT)
Dept: LAB | Facility: AMBULARY SURGERY CENTER | Age: 73
End: 2023-01-27
Attending: UROLOGY

## 2023-01-27 DIAGNOSIS — R39.89 SUSPECTED UTI: ICD-10-CM

## 2023-01-27 DIAGNOSIS — N13.30 HYDRONEPHROSIS, UNSPECIFIED HYDRONEPHROSIS TYPE: ICD-10-CM

## 2023-01-29 LAB — BACTERIA UR CULT: NORMAL

## 2023-02-06 ENCOUNTER — PREP FOR PROCEDURE (OUTPATIENT)
Dept: UROLOGY | Facility: AMBULATORY SURGERY CENTER | Age: 73
End: 2023-02-06

## 2023-02-08 NOTE — PRE-PROCEDURE INSTRUCTIONS
Pre-Surgery Instructions:   Medication Instructions   • amLODIPine (NORVASC) 5 mg tablet Take day of surgery  • metoprolol succinate (TOPROL-XL) 25 mg 24 hr tablet Take day of surgery  INSTR ON TONI CALL,  REPORT LOC , BRING PHOTO ID/MED LIST/INS  INFO ,SHOWER REV , STOP ASA/NSAID/VIT 7 DAY PREOP, PT VERBALIZES UNDERSTANDING W/ NO FURTHER QUESTIONS

## 2023-02-10 ENCOUNTER — TELEPHONE (OUTPATIENT)
Dept: UROLOGY | Facility: CLINIC | Age: 73
End: 2023-02-10

## 2023-02-10 ENCOUNTER — ANESTHESIA EVENT (OUTPATIENT)
Dept: PERIOP | Facility: AMBULARY SURGERY CENTER | Age: 73
End: 2023-02-10

## 2023-02-10 ENCOUNTER — APPOINTMENT (OUTPATIENT)
Dept: RADIOLOGY | Facility: AMBULARY SURGERY CENTER | Age: 73
End: 2023-02-10

## 2023-02-10 ENCOUNTER — HOSPITAL ENCOUNTER (OUTPATIENT)
Facility: AMBULARY SURGERY CENTER | Age: 73
Setting detail: OUTPATIENT SURGERY
Discharge: HOME | End: 2023-02-10
Attending: UROLOGY | Admitting: UROLOGY

## 2023-02-10 ENCOUNTER — ANESTHESIA (OUTPATIENT)
Dept: PERIOP | Facility: AMBULARY SURGERY CENTER | Age: 73
End: 2023-02-10

## 2023-02-10 VITALS
OXYGEN SATURATION: 98 % | WEIGHT: 145 LBS | RESPIRATION RATE: 14 BRPM | HEART RATE: 55 BPM | HEIGHT: 64 IN | SYSTOLIC BLOOD PRESSURE: 148 MMHG | TEMPERATURE: 98.2 F | BODY MASS INDEX: 24.75 KG/M2 | DIASTOLIC BLOOD PRESSURE: 67 MMHG

## 2023-02-10 DEVICE — STENT URETERAL 6FR 22CM INLAY OPTIMA W/NITINOL GDWR: Type: IMPLANTABLE DEVICE | Site: URETER | Status: FUNCTIONAL

## 2023-02-10 RX ORDER — ONDANSETRON 2 MG/ML
INJECTION INTRAMUSCULAR; INTRAVENOUS AS NEEDED
Status: DISCONTINUED | OUTPATIENT
Start: 2023-02-10 | End: 2023-02-10

## 2023-02-10 RX ORDER — PROPOFOL 10 MG/ML
INJECTION, EMULSION INTRAVENOUS CONTINUOUS PRN
Status: DISCONTINUED | OUTPATIENT
Start: 2023-02-10 | End: 2023-02-10

## 2023-02-10 RX ORDER — FENTANYL CITRATE/PF 50 MCG/ML
25 SYRINGE (ML) INJECTION
Status: DISCONTINUED | OUTPATIENT
Start: 2023-02-10 | End: 2023-02-10 | Stop reason: HOSPADM

## 2023-02-10 RX ORDER — FENTANYL CITRATE 50 UG/ML
INJECTION, SOLUTION INTRAMUSCULAR; INTRAVENOUS AS NEEDED
Status: DISCONTINUED | OUTPATIENT
Start: 2023-02-10 | End: 2023-02-10

## 2023-02-10 RX ORDER — SODIUM CHLORIDE, SODIUM LACTATE, POTASSIUM CHLORIDE, CALCIUM CHLORIDE 600; 310; 30; 20 MG/100ML; MG/100ML; MG/100ML; MG/100ML
INJECTION, SOLUTION INTRAVENOUS CONTINUOUS PRN
Status: DISCONTINUED | OUTPATIENT
Start: 2023-02-10 | End: 2023-02-10

## 2023-02-10 RX ORDER — PROPOFOL 10 MG/ML
INJECTION, EMULSION INTRAVENOUS AS NEEDED
Status: DISCONTINUED | OUTPATIENT
Start: 2023-02-10 | End: 2023-02-10

## 2023-02-10 RX ORDER — ONDANSETRON 2 MG/ML
4 INJECTION INTRAMUSCULAR; INTRAVENOUS EVERY 4 HOURS PRN
Status: DISCONTINUED | OUTPATIENT
Start: 2023-02-10 | End: 2023-02-10 | Stop reason: HOSPADM

## 2023-02-10 RX ORDER — DIPHENHYDRAMINE HYDROCHLORIDE 50 MG/ML
12.5 INJECTION INTRAMUSCULAR; INTRAVENOUS ONCE AS NEEDED
Status: DISCONTINUED | OUTPATIENT
Start: 2023-02-10 | End: 2023-02-10 | Stop reason: HOSPADM

## 2023-02-10 RX ORDER — MAGNESIUM HYDROXIDE 1200 MG/15ML
LIQUID ORAL AS NEEDED
Status: DISCONTINUED | OUTPATIENT
Start: 2023-02-10 | End: 2023-02-10 | Stop reason: HOSPADM

## 2023-02-10 RX ORDER — CEFAZOLIN SODIUM 1 G/50ML
1000 SOLUTION INTRAVENOUS
Status: COMPLETED | OUTPATIENT
Start: 2023-02-10 | End: 2023-02-10

## 2023-02-10 RX ORDER — DEXAMETHASONE SODIUM PHOSPHATE 10 MG/ML
INJECTION, SOLUTION INTRAMUSCULAR; INTRAVENOUS AS NEEDED
Status: DISCONTINUED | OUTPATIENT
Start: 2023-02-10 | End: 2023-02-10

## 2023-02-10 RX ADMIN — FENTANYL CITRATE 25 MCG: 50 INJECTION, SOLUTION INTRAMUSCULAR; INTRAVENOUS at 10:09

## 2023-02-10 RX ADMIN — FENTANYL CITRATE 25 MCG: 50 INJECTION, SOLUTION INTRAMUSCULAR; INTRAVENOUS at 09:53

## 2023-02-10 RX ADMIN — SODIUM CHLORIDE, SODIUM LACTATE, POTASSIUM CHLORIDE, AND CALCIUM CHLORIDE: .6; .31; .03; .02 INJECTION, SOLUTION INTRAVENOUS at 09:06

## 2023-02-10 RX ADMIN — CEFAZOLIN SODIUM 1000 MG: 1 SOLUTION INTRAVENOUS at 09:46

## 2023-02-10 RX ADMIN — ONDANSETRON 4 MG: 2 INJECTION INTRAMUSCULAR; INTRAVENOUS at 09:47

## 2023-02-10 RX ADMIN — FENTANYL CITRATE 25 MCG: 50 INJECTION, SOLUTION INTRAMUSCULAR; INTRAVENOUS at 10:07

## 2023-02-10 RX ADMIN — DEXAMETHASONE SODIUM PHOSPHATE 10 MG: 10 INJECTION, SOLUTION INTRAMUSCULAR; INTRAVENOUS at 09:57

## 2023-02-10 RX ADMIN — FENTANYL CITRATE 25 MCG: 50 INJECTION, SOLUTION INTRAMUSCULAR; INTRAVENOUS at 09:57

## 2023-02-10 RX ADMIN — PROPOFOL 80 MCG/KG/MIN: 10 INJECTION, EMULSION INTRAVENOUS at 09:56

## 2023-02-10 RX ADMIN — PROPOFOL 30 MG: 10 INJECTION, EMULSION INTRAVENOUS at 09:53

## 2023-02-10 NOTE — DISCHARGE INSTR - AVS FIRST PAGE
Delmon Scheuermann: Your surgery went very well  Your stent was exchanged successfully  I will schedule your next procedure in 4 months  Please drink 6-8 glasses of water per day    Please call with any questions or concerns  Suzan Flores MD,PhD  [facility]  [phone]            WHAT IS A STENT? At the end of the procedure, your doctor may place a stent into your ureter  A stent is a thin, flexible piece of plastic that will hold open your ureter while the remaining small pieces of stone pass  This allows your kidney to drain easily and prevents you from having to “pass” these small stone pieces on your own, which could be painful  The stent is about 12 inches long and looks and feels like a thin piece of spaghetti  AFTER THE PROCEDURE  After the procedure you may experience the following symptoms  All of these are normal and should resolve within 1 or 2 days after your stent is removed  Urinary frequency (urinating more often than usual)  Urinary urgency (the sensation that you need to urinate right away)  Painful urination (this can be pain in your bladder or in your back when  you urinate)  Blood in your urine ( a stent can irritate the lining of your bladder causing it to bleed)  Back/Flank pain, especially with urination  You will receive a prescription for narcotic pain medication after the procedure  You will also receive a prescription for tamsulosin which you will take once a day for 2 weeks to help relax your ureter and decrease stent discomfort  You will also need to purchase a stool softener (i e  Colace) or mild laxative (i e  Miralax) as the narcotic pain medication can make you constipated  This is important as constipation can exacerbate stent related symptoms

## 2023-02-10 NOTE — OP NOTE
Operative Note     PATIENT:  Compa Davila (MRN [id number])    DATE OF PROCEDURE:   2/10/2023    PRE-OP DIAGNOSIS:   1) chronic left UPJ obstruction  2) severe hydronephrosis  3) indwelling left ureteral stent    POST-OP DIAGNOSIS:   1) chronic left UPJ obstruction  2) severe hydronephrosis  3) indwelling left ureteral stent    PROCEDURES PERFORMED:  1) Cystoscopy  2) Left retrograde pyelography with fluoroscopic interpretation  3) Left ureteral stent exchange    SURGEON:  Yamilex Mckeon MD    ASSISTANTS:  There were no qualified teaching residents to assist with this case    ANESTHESIA: General     COMPLICATIONS:   None    ANTIBIOTICS:  Ancef    INTRAOPERATIVE THROMBOEMBOLISM PROPHYLAXIS:  Pneumatic compression stockings      FINDINGS:  Successful stent exchange  Severe hydronephrosis with blunting of the calices      INDICATIONS FOR PROCEDURE:  Compa Davila is an 74 y o  old female with left UPJ obstruction  She has been managed for some time with chronic ureteral stent exchange     After discussing the options, the patient elected to undergo ureteral stent exchange  We discussed the procedure in detail, the alternatives, and the risks, and they signed informed consent to proceed  PROCEDURE IN DETAIL:   The patient was identified and brought to the OR  Antibiotic prophylaxis and DVT prophylaxis were administered  They were placed in the comfortable dorsal lithotomy position with care to pad all pressure points  They were prepped and draped in the usual sterile fashion using hibiclens  A surgical time out was performed with all in the room in agreement with the correct patient, procedure, indications, and laterality  A 21-Welsh rigid cystoscope was used to enter the bladder  The bladder was inspected in its entirety and there were no lesions noted  The ureteral orifices were identified in their normal orthotopic positions       The Left ureteral orifice was identified and a 5 Fr open ended catheter was placed into the ureteral orifice  A retrograde pyelogram was performed with injection of 50/50 Isovue with the findings as described above  A Sensor wire was up to the kidney under fluoroscopic guidance  The open-ended catheter is navigated atop the pre-placed wire and advanced to the renal pelvis  the distance between the UVJ and the UPJ was measured and appropriately sized stent was selected  The JJ stent was then passed up the wire  under fluoroscopic guidance into the  kidney with a good curl noted in the kidney and in the bladder  The bladder was drained  The patient was placed back supine, awakened from general anesthesia and brought to recovery room in stable condition  SPECIMENS:   * No orders in the log *     IMPLANTS:   Implant Name Type Inv   Item Serial No   Lot No  LRB No  Used Action   STENT URETERAL 6FR 22CM INLAY OPTIMA W/NITINOL GDWR - [id number]  STENT URETERAL 6FR 22CM INLAY OPTIMA W/NITINOL [employer] [id number] Left 1 Implanted        COMPLICATIONS: None    DISPOSITION: PACU    PLAN: I will plan the next procedure in 4 months

## 2023-02-10 NOTE — ANESTHESIA PREPROCEDURE EVALUATION
Procedure:  EXCHANGE STENT URETERAL (Left: Ureter)    Relevant Problems   CARDIO   (+) Essential hypertension   (+) Nonrheumatic aortic valve insufficiency   (+) SVT (supraventricular tachycardia) (HCC)      ENDO   (-) Diabetes mellitus, type 2 (HCC)   (-) Hyperthyroidism   (-) Hypothyroidism      GI/HEPATIC   (+) GERD (gastroesophageal reflux disease)      /RENAL   (+) Stage 2 chronic kidney disease      GYN   (+) Adenocarcinoma of endometrium, stage 3 (HCC)   (+) Uterine cancer (HCC)      HEMATOLOGY (within normal limits)      MUSCULOSKELETAL (within normal limits)      NEURO/PSYCH   (+) H/O small bowel obstruction      PULMONARY   (-) Asthma   (-) Sleep apnea        Physical Exam    Airway    Mallampati score: III  TM Distance: <3 FB  Neck ROM: full     Dental   No notable dental hx     Cardiovascular      Pulmonary      Other Findings        Anesthesia Plan  ASA Score- 3     Anesthesia Type- IV sedation with anesthesia with ASA Monitors  Additional Monitors:   Airway Plan:           Plan Factors-Exercise tolerance (METS): >4 METS  Chart reviewed  EKG reviewed  Existing labs reviewed  Patient summary reviewed  Patient is not a current smoker  Induction- intravenous  Postoperative Plan- Plan for postoperative opioid use  Informed Consent- Anesthetic plan and risks discussed with patient  I personally reviewed this patient with the CRNA  Discussed and agreed on the Anesthesia Plan with the CRNA  Yessy Orr

## 2023-02-10 NOTE — H&P
UROLOGY HISTORY AND PHYSICAL     Patient Identifiers: Yamile Silva (MRN [id number])      Date of Service: 2/10/2023        ASSESSMENT:     74 y o  old female with  UPJ obstruction managed ureteral stents  PLAN:     2 OR for left ureteral stent exchange      History of Present Illness:     Yamile Silva is a 74 y o  old with a history of UPJ obstruction    Past Medical, Past Surgical History:     Past Medical History:   Diagnosis Date   • Chronic kidney disease    • GERD (gastroesophageal reflux disease)    • Hypertension    • Lyme disease    :    Past Surgical History:   Procedure Laterality Date   • ABDOMINAL SURGERY      SCAR TISSUE AROUND BOWEL RESECTED   • FL RETROGRADE PYELOGRAM  10/21/2021   • FL RETROGRADE PYELOGRAM  02/25/2022   • FL RETROGRADE PYELOGRAM  08/22/2022   • FL RETROGRADE PYELOGRAM  11/21/2022   • HYSTERECTOMY     • FL CYSTO BLADDER W/URETERAL CATHETERIZATION Bilateral 10/21/2021    Procedure: CYSTOSCOPY RETROGRADE PYELOGRAM WITH INSERTION STENT URETERAL;  Surgeon: Macario Bonilla MD;  [facility];  Service: Urology   • FL CYSTO BLADDER W/URETERAL CATHETERIZATION Left 02/25/2022    Procedure: CYSTOSCOPY RETROGRADE PYELOGRAM WITH INSERTION STENT URETERAL; DIAGNOSTIC URETEROSCOPY;  Surgeon: Macario Bonilla MD;  [facility];  Service: Urology   • FL CYSTO BLADDER W/URETERAL CATHETERIZATION Left 08/22/2022    Procedure: CYSTOSCOPY RETROGRADE PYELOGRAM WITH EXCHANGE STENT URETERAL;  Surgeon: Ameena Adame MD;  [facility];  Service: Urology   • FL CYSTO BLADDER W/URETERAL CATHETERIZATION Left 11/21/2022    Procedure: CYSTOSCOPY RETROGRADE PYELOGRAM WITH INSERTION STENT URETERAL;  Surgeon: Macario Bonilla MD;  [facility];  Service: Urology   :    Medications, Allergies:   No current facility-administered medications for this encounter  Allergies:   Allergies   Allergen Reactions   • Bactrim [Sulfamethoxazole-Trimethoprim] Other (See Comments)     Her mom  from [address], Family histor? • Sulfa Antibiotics Other (See Comments)     Causes sores on skin - per pt   • Cefixime Other (See Comments)     30 years ago, unclear reaction  Believes allergy listed due to C Dif  Tolerated Ceftriaxone 10/21   :    Social and Family History:   Social History:   Social History     Tobacco Use   • Smoking status: Never   • Smokeless tobacco: Never   Vaping Use   • Vaping Use: Never used   Substance Use Topics   • Alcohol use: Yes     Comment: socially   • Drug use: No        Social History     Tobacco Use   Smoking Status Never   Smokeless Tobacco Never       Family History:  Family History   Problem Relation Age of Onset   • Stroke Father    :     Review of Systems:     General: Fever, chills, or night sweats: negative  Cardiac: Negative for chest pain  Pulmonary: Negative for shortness of breath  Gastrointestinal: Abdominal pain negative  Nausea, vomiting, or diarrhea negative  Genitourinary: See HPI above  Patient does nothave hematuria  All other systems queried were negative  Physical Exam:   General: Patient is pleasant and in NAD  Awake and alert  There were no vitals taken for this visit  HEENT:  Normocephalic atraumatic  Cardiac:  Regular rate and rhythm, Peripheral edema: negative  Pulmonary: Non-labored breathing, CTAB  Abdomen: Soft, non-tender, non-distended  No surgical scars  No masses, tenderness, hernias noted  Genitourinary: negative CVA tenderness, neg suprapubic tenderness    Extremities: normal movement in all 4       Labs:     Lab Results   Component Value Date    HGB 10 4 (L) 2022    HCT 32 6 (L) 2022    WBC 6 74 2022     2022   ]    Lab Results   Component Value Date    K 3 8 2022     (H) 2022    CO2 22 2022    BUN 17 2022    CREATININE 0 59 (L) 2022    CALCIUM 8 4 2022   ]    Imaging:   I personally reviewed the images and report of the following studies, and reviewed them with the patient:        Thank you for allowing me to participate in this patients’ care  Please do not hesitate to call with any additional questions    Patricia Lora MD

## 2023-02-10 NOTE — TELEPHONE ENCOUNTER
Please schedule the patient for next cystoscopy left ureteral stent exchange with retrograde pyelogram at the [facility] in 4 months  Case request placed      CC: LAURA Ortiz for tracking of chronic stent

## 2023-02-10 NOTE — ANESTHESIA POSTPROCEDURE EVALUATION
Post-Op Assessment Note    CV Status:  Stable  Pain Score: 0    Pain management: adequate     Mental Status:  Alert and awake   Hydration Status:  Euvolemic   PONV Controlled:  Controlled   Airway Patency:  Patent      Post Op Vitals Reviewed: Yes      Staff: CRNA         No notable events documented      BP   103/60   Temp      Pulse 56   Resp   18   SpO2 96

## 2023-05-09 ENCOUNTER — PREP FOR PROCEDURE (OUTPATIENT)
Dept: UROLOGY | Facility: CLINIC | Age: 73
End: 2023-05-09

## 2023-05-09 DIAGNOSIS — R39.89 SUSPECTED UTI: ICD-10-CM

## 2023-05-09 DIAGNOSIS — N13.30 HYDRONEPHROSIS, UNSPECIFIED HYDRONEPHROSIS TYPE: Primary | ICD-10-CM

## 2023-06-09 ENCOUNTER — APPOINTMENT (OUTPATIENT)
Dept: LAB | Facility: AMBULARY SURGERY CENTER | Age: 73
End: 2023-06-09
Payer: MEDICARE

## 2023-06-09 DIAGNOSIS — N13.5 UPJ (URETEROPELVIC JUNCTION) OBSTRUCTION: ICD-10-CM

## 2023-06-09 DIAGNOSIS — N13.39 OTHER HYDRONEPHROSIS: ICD-10-CM

## 2023-06-09 DIAGNOSIS — N39.0 URINARY TRACT INFECTION WITHOUT HEMATURIA, SITE UNSPECIFIED: ICD-10-CM

## 2023-06-09 DIAGNOSIS — N13.30 HYDRONEPHROSIS, UNSPECIFIED HYDRONEPHROSIS TYPE: ICD-10-CM

## 2023-06-09 PROCEDURE — 87086 URINE CULTURE/COLONY COUNT: CPT

## 2023-06-10 LAB — BACTERIA UR CULT: NORMAL

## 2023-06-12 ENCOUNTER — TELEPHONE (OUTPATIENT)
Dept: UROLOGY | Facility: CLINIC | Age: 73
End: 2023-06-12

## 2023-06-12 DIAGNOSIS — N13.30 HYDRONEPHROSIS, UNSPECIFIED HYDRONEPHROSIS TYPE: Primary | ICD-10-CM

## 2023-06-12 RX ORDER — CEPHALEXIN 500 MG/1
500 CAPSULE ORAL EVERY 6 HOURS SCHEDULED
Qty: 20 CAPSULE | Refills: 0 | Status: SHIPPED | OUTPATIENT
Start: 2023-06-20 | End: 2023-06-25

## 2023-06-12 NOTE — TELEPHONE ENCOUNTER
Pt made aware of note below  ----- Message from Vince Beal PA-C sent at 6/12/2023  3:26 PM EDT -----  Due for upcoming stent exchange  Culture mixed growth low level   Please have her start keflex 3 days prior to OR

## 2023-06-16 NOTE — TELEPHONE ENCOUNTER
Spoke with patient and she states the instructions on the bottle state take 5 days prior to surgery (do not start before 6/20/23)  Advised patient to take 3 days prior to surgery which would be 6/20/23    She verbalized understanding and agrees to plan

## 2023-06-16 NOTE — TELEPHONE ENCOUNTER
Patient would like a call back to clarify how and when she should start the Keflex      Patient can be reached at [phone]

## 2023-06-20 NOTE — PRE-PROCEDURE INSTRUCTIONS
Pre-Surgery Instructions:   Medication Instructions   • amLODIPine (NORVASC) 5 mg tablet Take day of surgery  • cephalexin (KEFLEX) 500 mg capsule Instructions provided by MD   • metoprolol succinate (TOPROL-XL) 25 mg 24 hr tablet Take day of surgery  Medication instructions for day surgery reviewed  Please use only a sip of water to take your instructed medications  Avoid all over the counter vitamins, supplements and NSAIDS for one week prior to surgery per anesthesia guidelines  Tylenol is ok to take as needed  You will receive a call one business day prior to surgery with an arrival time and hospital directions  If your surgery is scheduled on a Monday, the hospital will be calling you on the Friday prior to your surgery  If you have not heard from anyone by 8pm, please call the hospital supervisor through the hospital  at [phone]  [facility] [phone])  Do not eat or drink anything after midnight the night before your surgery, including candy, mints, lifesavers, or chewing gum  Do not drink alcohol 24hrs before your surgery  Try not to smoke at least 24hrs before your surgery  Follow the pre surgery showering instructions as listed in the [facility] Surgical Experience Booklet” or otherwise provided by your surgeon's office  Do not shave the surgical area 24 hours before surgery  Do not apply any lotions, creams, including makeup, cologne, deodorant, or perfumes after showering on the day of your surgery  No contact lenses, eye make-up, or artificial eyelashes  Remove nail polish, including gel polish, and any artificial, gel, or acrylic nails if possible  Remove all jewelry including rings and body piercing jewelry  Wear causal clothing that is easy to take on and off  Consider your type of surgery  Keep any valuables, jewelry, piercings at home  Please bring any specially ordered equipment (sling, braces) if indicated      Arrange for a responsible person to drive you to and from the hospital on the day of your surgery  Visitor Guidelines discussed  Call the surgeon's office with any new illnesses, exposures, or additional questions prior to surgery  Please reference your [facility] Surgical Experience Booklet” for additional information to prepare for your upcoming surgery

## 2023-06-22 ENCOUNTER — ANESTHESIA EVENT (OUTPATIENT)
Dept: PERIOP | Facility: AMBULARY SURGERY CENTER | Age: 73
End: 2023-06-22
Payer: MEDICARE

## 2023-06-22 NOTE — ANESTHESIA PREPROCEDURE EVALUATION
Procedure:  CYSTOSCOPY RETROGRADE PYELOGRAM WITH exchange STENT URETERAL (Left: Bladder)  EXCHANGE STENT URETERAL (Left: Ureter)    Relevant Problems   CARDIO   (+) Essential hypertension   (+) Nonrheumatic aortic valve insufficiency   (+) SVT (supraventricular tachycardia) (HCC)      GI/HEPATIC   (+) GERD (gastroesophageal reflux disease)      /RENAL   (+) Stage 2 chronic kidney disease      GYN   (+) Adenocarcinoma of endometrium, stage 3 (HCC)   (+) Uterine cancer (HCC)      •  Left Ventricle: Left ventricle is normal in size  Wall thickness is   normal  Systolic function is normal with an ejection fraction of 65-70%  •  Left Atrium: Left atrium cavity is mildly dilated  •  Aortic Valve: The aortic valve is trileaflet  There is moderate   regurgitation (PHT 336ms)  No diastolic flow reversal observed in aorta in   limited views  Recommend ANGIE for better assessment of degree and mechanism   of aortic regurgitation is clinically warranted  •  Mitral Valve: There is trace regurgitation  •  Tricuspid Valve: There is trace regurgitation  •  Pericardium: There is no pericardial effusion  •  Compared with a study from 2/2016 which reported mild mitral   regurgitaton and mild aortic regurgitation with normal left atrial size,   there now appears to be moderate aortic insufficiency and trace mitral and   tricuspid regurgitation is noted  The left atrium is mildly enlarged  Overall systolic function has increased    Physical Exam    Airway    Mallampati score: II  TM Distance: >3 FB  Neck ROM: full     Dental   No notable dental hx     Cardiovascular  Cardiovascular exam normal    Pulmonary  Pulmonary exam normal     Other Findings        Anesthesia Plan  ASA Score- 3     Anesthesia Type- IV sedation with anesthesia with ASA Monitors  Additional Monitors:   Airway Plan: LMA  Plan Factors-Exercise tolerance (METS): >4 METS  Chart reviewed  EKG reviewed  Imaging results reviewed   Existing labs reviewed  Patient summary reviewed  Patient is not a current smoker  Patient not instructed to abstain from smoking on day of procedure  Patient did not smoke on day of surgery  Obstructive sleep apnea risk education given perioperatively  Induction- intravenous  Postoperative Plan-     Informed Consent- Anesthetic plan and risks discussed with patient  I personally reviewed this patient with the CRNA  Discussed and agreed on the Anesthesia Plan with the CRNA  Javier Moe

## 2023-06-23 ENCOUNTER — APPOINTMENT (OUTPATIENT)
Dept: RADIOLOGY | Facility: AMBULARY SURGERY CENTER | Age: 73
End: 2023-06-23
Payer: MEDICARE

## 2023-06-23 ENCOUNTER — PREP FOR PROCEDURE (OUTPATIENT)
Dept: UROLOGY | Facility: CLINIC | Age: 73
End: 2023-06-23

## 2023-06-23 ENCOUNTER — HOSPITAL ENCOUNTER (OUTPATIENT)
Facility: AMBULARY SURGERY CENTER | Age: 73
Setting detail: OUTPATIENT SURGERY
Discharge: HOME | End: 2023-06-23
Attending: UROLOGY | Admitting: UROLOGY
Payer: MEDICARE

## 2023-06-23 ENCOUNTER — TELEPHONE (OUTPATIENT)
Dept: UROLOGY | Facility: CLINIC | Age: 73
End: 2023-06-23

## 2023-06-23 ENCOUNTER — ANESTHESIA (OUTPATIENT)
Dept: PERIOP | Facility: AMBULARY SURGERY CENTER | Age: 73
End: 2023-06-23
Payer: MEDICARE

## 2023-06-23 VITALS
OXYGEN SATURATION: 99 % | DIASTOLIC BLOOD PRESSURE: 64 MMHG | BODY MASS INDEX: 24.75 KG/M2 | WEIGHT: 145 LBS | RESPIRATION RATE: 18 BRPM | HEART RATE: 54 BPM | SYSTOLIC BLOOD PRESSURE: 127 MMHG | HEIGHT: 64 IN | TEMPERATURE: 98 F

## 2023-06-23 DIAGNOSIS — N13.9 OBSTRUCTIVE UROPATHY: Primary | ICD-10-CM

## 2023-06-23 DIAGNOSIS — N13.5 UPJ (URETEROPELVIC JUNCTION) OBSTRUCTION: Primary | ICD-10-CM

## 2023-06-23 PROCEDURE — C1769 GUIDE WIRE: HCPCS | Performed by: UROLOGY

## 2023-06-23 PROCEDURE — NC001 PR NO CHARGE: Performed by: UROLOGY

## 2023-06-23 PROCEDURE — C2617 STENT, NON-COR, TEM W/O DEL: HCPCS | Performed by: UROLOGY

## 2023-06-23 PROCEDURE — 52332 CYSTOSCOPY AND TREATMENT: CPT | Performed by: UROLOGY

## 2023-06-23 PROCEDURE — 74420 UROGRAPHY RTRGR +-KUB: CPT

## 2023-06-23 PROCEDURE — C1758 CATHETER, URETERAL: HCPCS | Performed by: UROLOGY

## 2023-06-23 DEVICE — STENT URETERAL 6FR 22CM INLAY OPTIMA W/NITINOL GDWR: Type: IMPLANTABLE DEVICE | Site: URETER | Status: FUNCTIONAL

## 2023-06-23 RX ORDER — MAGNESIUM HYDROXIDE 1200 MG/15ML
LIQUID ORAL AS NEEDED
Status: DISCONTINUED | OUTPATIENT
Start: 2023-06-23 | End: 2023-06-23 | Stop reason: HOSPADM

## 2023-06-23 RX ORDER — SODIUM CHLORIDE, SODIUM LACTATE, POTASSIUM CHLORIDE, CALCIUM CHLORIDE 600; 310; 30; 20 MG/100ML; MG/100ML; MG/100ML; MG/100ML
50 INJECTION, SOLUTION INTRAVENOUS CONTINUOUS
Status: DISCONTINUED | OUTPATIENT
Start: 2023-06-23 | End: 2023-06-23 | Stop reason: HOSPADM

## 2023-06-23 RX ORDER — PHENAZOPYRIDINE HYDROCHLORIDE 200 MG/1
200 TABLET, FILM COATED ORAL 3 TIMES DAILY PRN
Qty: 10 TABLET | Refills: 0 | Status: SHIPPED | OUTPATIENT
Start: 2023-06-23 | End: 2023-06-26

## 2023-06-23 RX ORDER — FENTANYL CITRATE/PF 50 MCG/ML
25 SYRINGE (ML) INJECTION
Status: DISCONTINUED | OUTPATIENT
Start: 2023-06-23 | End: 2023-06-23 | Stop reason: HOSPADM

## 2023-06-23 RX ORDER — DEXMEDETOMIDINE HYDROCHLORIDE 100 UG/ML
INJECTION, SOLUTION INTRAVENOUS AS NEEDED
Status: DISCONTINUED | OUTPATIENT
Start: 2023-06-23 | End: 2023-06-23

## 2023-06-23 RX ORDER — FENTANYL CITRATE 50 UG/ML
INJECTION, SOLUTION INTRAMUSCULAR; INTRAVENOUS AS NEEDED
Status: DISCONTINUED | OUTPATIENT
Start: 2023-06-23 | End: 2023-06-23

## 2023-06-23 RX ORDER — ACETAMINOPHEN 325 MG/1
650 TABLET ORAL EVERY 4 HOURS PRN
Qty: 30 TABLET | Refills: 0
Start: 2023-06-23

## 2023-06-23 RX ORDER — ONDANSETRON 2 MG/ML
4 INJECTION INTRAMUSCULAR; INTRAVENOUS ONCE AS NEEDED
Status: DISCONTINUED | OUTPATIENT
Start: 2023-06-23 | End: 2023-06-23 | Stop reason: HOSPADM

## 2023-06-23 RX ORDER — LIDOCAINE HYDROCHLORIDE 10 MG/ML
INJECTION, SOLUTION EPIDURAL; INFILTRATION; INTRACAUDAL; PERINEURAL AS NEEDED
Status: DISCONTINUED | OUTPATIENT
Start: 2023-06-23 | End: 2023-06-23

## 2023-06-23 RX ORDER — PROPOFOL 10 MG/ML
INJECTION, EMULSION INTRAVENOUS AS NEEDED
Status: DISCONTINUED | OUTPATIENT
Start: 2023-06-23 | End: 2023-06-23

## 2023-06-23 RX ORDER — SODIUM CHLORIDE, SODIUM LACTATE, POTASSIUM CHLORIDE, CALCIUM CHLORIDE 600; 310; 30; 20 MG/100ML; MG/100ML; MG/100ML; MG/100ML
INJECTION, SOLUTION INTRAVENOUS CONTINUOUS PRN
Status: DISCONTINUED | OUTPATIENT
Start: 2023-06-23 | End: 2023-06-23

## 2023-06-23 RX ORDER — CIPROFLOXACIN 2 MG/ML
400 INJECTION, SOLUTION INTRAVENOUS ONCE
OUTPATIENT
Start: 2023-06-23 | End: 2023-06-23

## 2023-06-23 RX ORDER — OXYCODONE HYDROCHLORIDE 5 MG/1
5 TABLET ORAL EVERY 4 HOURS PRN
Qty: 5 TABLET | Refills: 0 | Status: SHIPPED | OUTPATIENT
Start: 2023-06-23 | End: 2023-06-28

## 2023-06-23 RX ORDER — ACETAMINOPHEN 325 MG/1
975 TABLET ORAL ONCE
Status: COMPLETED | OUTPATIENT
Start: 2023-06-23 | End: 2023-06-23

## 2023-06-23 RX ORDER — CEFAZOLIN SODIUM 1 G/50ML
1000 SOLUTION INTRAVENOUS ONCE
Status: COMPLETED | OUTPATIENT
Start: 2023-06-23 | End: 2023-06-23

## 2023-06-23 RX ORDER — ATROPINE SULFATE 1 MG/ML
INJECTION, SOLUTION INTRAVENOUS AS NEEDED
Status: DISCONTINUED | OUTPATIENT
Start: 2023-06-23 | End: 2023-06-23

## 2023-06-23 RX ORDER — PROPOFOL 10 MG/ML
INJECTION, EMULSION INTRAVENOUS CONTINUOUS PRN
Status: DISCONTINUED | OUTPATIENT
Start: 2023-06-23 | End: 2023-06-23

## 2023-06-23 RX ADMIN — LIDOCAINE HYDROCHLORIDE 50 MG: 10 INJECTION, SOLUTION EPIDURAL; INFILTRATION; INTRACAUDAL; PERINEURAL at 10:54

## 2023-06-23 RX ADMIN — FENTANYL CITRATE 50 MCG: 50 INJECTION INTRAMUSCULAR; INTRAVENOUS at 10:54

## 2023-06-23 RX ADMIN — DEXMEDETOMIDINE HYDROCHLORIDE 8 MCG: 100 INJECTION, SOLUTION INTRAVENOUS at 10:59

## 2023-06-23 RX ADMIN — CEFAZOLIN SODIUM 1000 MG: 1 SOLUTION INTRAVENOUS at 10:49

## 2023-06-23 RX ADMIN — ACETAMINOPHEN 975 MG: 325 TABLET, FILM COATED ORAL at 09:30

## 2023-06-23 RX ADMIN — PROPOFOL 50 MG: 10 INJECTION, EMULSION INTRAVENOUS at 10:54

## 2023-06-23 RX ADMIN — ATROPINE SULFATE 0.5 MCG: 1 INJECTION, SOLUTION INTRAVENOUS at 11:00

## 2023-06-23 RX ADMIN — PROPOFOL 30 MG: 10 INJECTION, EMULSION INTRAVENOUS at 10:57

## 2023-06-23 RX ADMIN — PROPOFOL 100 MCG/KG/MIN: 10 INJECTION, EMULSION INTRAVENOUS at 10:54

## 2023-06-23 RX ADMIN — SODIUM CHLORIDE, SODIUM LACTATE, POTASSIUM CHLORIDE, AND CALCIUM CHLORIDE: .6; .31; .03; .02 INJECTION, SOLUTION INTRAVENOUS at 09:40

## 2023-06-23 NOTE — OP NOTE
Operative Note     PATIENT:  Desire Nunes (MRN [id number])    DATE OF PROCEDURE:   6/23/2023    PRE-OP DIAGNOSIS:   1) chronic left UPJ obstruction  2) severe hydronephrosis  3) indwelling left ureteral stent    POST-OP DIAGNOSIS:   1) chronic left UPJ obstruction  2) severe hydronephrosis  3) indwelling left ureteral stent    PROCEDURES PERFORMED:  1) Cystoscopy  2) Left retrograde pyelography with fluoroscopic interpretation  3) Left ureteral stent placement    SURGEON:  Nava Gupta MD    ASSISTANTS:  There were no qualified teaching residents to assist with this case    ANESTHESIA: General     COMPLICATIONS:   None    ANTIBIOTICS:  Ancef    INTRAOPERATIVE THROMBOEMBOLISM PROPHYLAXIS:  Pneumatic compression stockings      FINDINGS:  Successful stent exchange  Severe hydronephrosis with blunting of the calices      INDICATIONS FOR PROCEDURE:  Desire Nunes is an 74 y o  old female with left UPJ obstruction  She has been managed for some time with chronic ureteral stent exchange     After discussing the options, the patient elected to undergo ureteral stent exchange  We discussed the procedure in detail, the alternatives, and the risks, and they signed informed consent to proceed  PROCEDURE IN DETAIL:   The patient was identified and brought to the OR  Antibiotic prophylaxis and DVT prophylaxis were administered  They were placed in the comfortable dorsal lithotomy position with care to pad all pressure points  They were prepped and draped in the usual sterile fashion using hibiclens  A surgical time out was performed with all in the room in agreement with the correct patient, procedure, indications, and laterality  A 21-Nigerian rigid cystoscope was used to enter the bladder  The bladder was inspected in its entirety and there were no lesions noted  The ureteral orifices were identified in their normal orthotopic positions       The Left ureteral orifice was identified and a 5 Fr open ended catheter was placed into the ureteral orifice  A retrograde pyelogram was performed with injection of 50/50 Isovue with the findings as described above  A Sensor wire was up to the kidney under fluoroscopic guidance  The open-ended catheter is navigated atop the pre-placed wire and advanced to the renal pelvis  the distance between the UVJ and the UPJ was measured and appropriately sized stent was selected  The JJ stent was then passed up the wire  under fluoroscopic guidance into the  kidney with a good curl noted in the kidney and in the bladder  The bladder was drained  The patient was placed back supine, awakened from general anesthesia and brought to recovery room in stable condition  SPECIMENS:   * No orders in the log *     IMPLANTS:   Implant Name Type Inv   Item Serial No   Lot No  LRB No  Used Action   STENT URETERAL 6FR 22CM INLAY OPTIMA W/NITINOL GDWR - [id number]  STENT URETERAL 6FR 22CM [employer] W/NITINOL [address] [id number] Left 1 Explanted   STENT URETERAL 6FR 22CM INLAY OPTIMA W/NITINOL GDWR - [id number]  STENT URETERAL 6FR 22CM INLAY OPTIMA W/NITINOL [address] [id number] Left 1 Implanted        COMPLICATIONS: None    DISPOSITION: PACU    PLAN: We will plan her next ureteral stent exchange in 5 months time

## 2023-06-23 NOTE — H&P
UROLOGY HISTORY AND PHYSICAL     Patient Identifiers: Konrad Coronel (MRN [id number])      Date of Service: 6/23/2023        ASSESSMENT:     74 y o  old female with  left UPJ obstruction managed conservatively with chronic ureteral stent exchange  Kinsey Erazo       PLAN:     Left ureteral stent exchange      History of Present Illness:     Konrad Coronel is a 74 y o  old with a history of left UPJ obstruction    Past Medical, Past Surgical History:     Past Medical History:   Diagnosis Date   • Bowel obstruction ([address] )    • Chronic kidney disease    • GERD (gastroesophageal reflux disease)    • Hernia of abdominal cavity    • Hypertension    • Lyme disease    :    Past Surgical History:   Procedure Laterality Date   • ABDOMINAL SURGERY      SCAR TISSUE AROUND BOWEL RESECTED   • CHOLECYSTECTOMY     • FL RETROGRADE PYELOGRAM  10/21/2021   • FL RETROGRADE PYELOGRAM  02/25/2022   • FL RETROGRADE PYELOGRAM  08/22/2022   • FL RETROGRADE PYELOGRAM  11/21/2022   • FL RETROGRADE PYELOGRAM  02/10/2023   • HYSTERECTOMY     • MN CYSTO BLADDER W/URETERAL CATHETERIZATION Bilateral 10/21/2021    Procedure: CYSTOSCOPY RETROGRADE PYELOGRAM WITH INSERTION STENT URETERAL;  Surgeon: Jin Lozano MD;  [facility];  Service: Urology   • MN CYSTO BLADDER W/URETERAL CATHETERIZATION Left 02/25/2022    Procedure: CYSTOSCOPY RETROGRADE PYELOGRAM WITH INSERTION STENT URETERAL; DIAGNOSTIC URETEROSCOPY;  Surgeon: Jin Lozano MD;  [facility];  Service: Urology   • MN CYSTO BLADDER W/URETERAL CATHETERIZATION Left 08/22/2022    Procedure: CYSTOSCOPY RETROGRADE PYELOGRAM WITH EXCHANGE STENT URETERAL;  Surgeon: Michelle Arellano MD;  [facility];  Service: Urology   • MN CYSTO BLADDER W/URETERAL CATHETERIZATION Left 11/21/2022    Procedure: CYSTOSCOPY RETROGRADE PYELOGRAM WITH INSERTION STENT URETERAL;  Surgeon: Jin Lozano MD;  [facility];  Service: Urology   • MN CYSTO W/INSERT URETERAL STENT Left 02/10/2023    Procedure: EXCHANGE STENT URETERAL;  Surgeon: Baldemar Aguilar MD;  [facility];  Service: Urology   :    Medications, Allergies:     Current Facility-Administered Medications:   •  ceFAZolin (ANCEF) IVPB (premix in dextrose) 1,000 mg 50 mL, 1,000 mg, Intravenous, Once, Baldemar Aguilar MD  •  iohexol (OMNIPAQUE) 240 MG/ML solution, , , PRN, Baldemar Aguilar MD, 10 mL at 23 0938  •  sodium chloride 0 9 % irrigation solution, , , PRN, Baldemar Aguilar MD, 1,000 mL at 23 9261  •  sterile water irrigation solution, , , PRN, Baldemar Aguilar MD, 2,000 mL at 23 0939    Allergies: Allergies   Allergen Reactions   • Bactrim [Sulfamethoxazole-Trimethoprim] Other (See Comments)     Her mom  from [address], Family histor? • Sulfa Antibiotics Other (See Comments)     Causes sores on skin - per pt   • Cefixime Other (See Comments)     30 years ago, unclear reaction  Believes allergy listed due to C Dif  Tolerated Ceftriaxone 10/21   :    Social and Family History:   Social History:   Social History     Tobacco Use   • Smoking status: Never   • Smokeless tobacco: Never   Vaping Use   • Vaping Use: Never used   Substance Use Topics   • Alcohol use: Yes     Comment: socially   • Drug use: No        Social History     Tobacco Use   Smoking Status Never   Smokeless Tobacco Never       Family History:  Family History   Problem Relation Age of Onset   • Stroke Father    :     Review of Systems:     General: Fever, chills, or night sweats: negative  Cardiac: Negative for chest pain  Pulmonary: Negative for shortness of breath  Gastrointestinal: Abdominal pain negative  Nausea, vomiting, or diarrhea negative  Genitourinary: See HPI above  Patient does nothave hematuria  All other systems queried were negative  Physical Exam:   General: Patient is pleasant and in NAD   Awake and alert  /70   Pulse (!) 50   Temp 98 1 °F (36 7 °C) (Temporal) "Resp 16   Ht 5' 4\" (1 626 m)   Wt 65 8 kg (145 lb)   SpO2 99%   BMI 24 89 kg/m²   HEENT:  Normocephalic atraumatic  Cardiac:  Regular rate and rhythm, Peripheral edema: negative  Pulmonary: Non-labored breathing, CTAB  Abdomen: Soft, non-tender, non-distended  No surgical scars  No masses, tenderness, hernias noted  Genitourinary: negative CVA tenderness, neg suprapubic tenderness  Extremities: normal movement in all 4       Labs:     Lab Results   Component Value Date    HGB 10 4 (L) 11/23/2022    HCT 32 6 (L) 11/23/2022    WBC 6 74 11/23/2022     11/23/2022   ]    Lab Results   Component Value Date    K 3 8 11/23/2022     (H) 11/23/2022    CO2 22 11/23/2022    BUN 17 11/23/2022    CREATININE 0 59 (L) 11/23/2022    CALCIUM 8 4 11/23/2022   ]    Imaging:   I personally reviewed the images and report of the following studies, and reviewed them with the patient:        Thank you for allowing me to participate in this patients’ care  Please do not hesitate to call with any additional questions    Jayson Pickering MD      "

## 2023-06-23 NOTE — DISCHARGE INSTR - AVS FIRST PAGE
Sherri Benitez: Your surgery went very well  Your left stent was successfully exchanged  We will plan to schedule your next procedure in 5 months time  Please take your medications as prescribed with caution for comfort  Most importantly please drink 6-8 glasses of water per day    Please call with any questions or concerns  Deepika Gtz MD,PhD  [facility]  [phone]            WHAT IS A STENT? At the end of the procedure, your doctor may place a stent into your ureter  A stent is a thin, flexible piece of plastic that will hold open your ureter while the remaining small pieces of stone pass  This allows your kidney to drain easily and prevents you from having to “pass” these small stone pieces on your own, which could be painful  The stent is about 12 inches long and looks and feels like a thin piece of spaghetti  AFTER THE PROCEDURE  After the procedure you may experience the following symptoms  All of these are normal and should resolve within 1 or 2 days after your stent is removed  Urinary frequency (urinating more often than usual)  Urinary urgency (the sensation that you need to urinate right away)  Painful urination (this can be pain in your bladder or in your back when  you urinate)  Blood in your urine ( a stent can irritate the lining of your bladder causing it to bleed)  Back/Flank pain, especially with urination  You will receive a prescription for narcotic pain medication after the procedure  You will also receive a prescription for tamsulosin which you will take once a day for 2 weeks to help relax your ureter and decrease stent discomfort  You will also need to purchase a stool softener (i e  Colace) or mild laxative (i e  Miralax) as the narcotic pain medication can make you constipated  This is important as constipation can exacerbate stent related symptoms

## 2023-06-23 NOTE — ANESTHESIA POSTPROCEDURE EVALUATION
Post-Op Assessment Note    CV Status:  Stable  Pain Score: 0    Pain management: adequate     Mental Status:  Alert and awake   Hydration Status:  Euvolemic   PONV Controlled:  Controlled   Airway Patency:  Patent      Post Op Vitals Reviewed: Yes      Staff: CRNA         No notable events documented      BP   95/55   Temp 98 0   Pulse 59   Resp 14   SpO2 98%

## 2023-11-21 ENCOUNTER — PREP FOR PROCEDURE (OUTPATIENT)
Dept: UROLOGY | Facility: AMBULATORY SURGERY CENTER | Age: 73
End: 2023-11-21

## 2023-11-21 DIAGNOSIS — N13.30 HYDRONEPHROSIS, UNSPECIFIED HYDRONEPHROSIS TYPE: Primary | ICD-10-CM

## 2023-11-21 DIAGNOSIS — R39.89 SUSPECTED UTI: ICD-10-CM

## 2023-12-29 ENCOUNTER — ANESTHESIA EVENT (OUTPATIENT)
Dept: PERIOP | Facility: AMBULARY SURGERY CENTER | Age: 73
End: 2023-12-29
Payer: MEDICARE

## 2023-12-29 ENCOUNTER — APPOINTMENT (OUTPATIENT)
Dept: LAB | Facility: AMBULARY SURGERY CENTER | Age: 73
End: 2023-12-29
Payer: MEDICARE

## 2023-12-29 ENCOUNTER — NURSE TRIAGE (OUTPATIENT)
Age: 73
End: 2023-12-29

## 2023-12-29 DIAGNOSIS — N13.30 HYDRONEPHROSIS, UNSPECIFIED HYDRONEPHROSIS TYPE: ICD-10-CM

## 2023-12-29 DIAGNOSIS — R39.89 SUSPECTED UTI: ICD-10-CM

## 2023-12-29 PROCEDURE — 87086 URINE CULTURE/COLONY COUNT: CPT

## 2023-12-29 NOTE — TELEPHONE ENCOUNTER
Called pt, no answer but lmom notifying she will need to r/s surgery since + Covid. Also, not to get ucx because she will need to have another one closer to r/s date. Christina to be notified for r/s

## 2023-12-29 NOTE — TELEPHONE ENCOUNTER
Pt returned call. Was upset that she went to get urine testing done. Did apologize for miscommunication. Advised someone from scheduling would call and set up a new date.

## 2023-12-29 NOTE — TELEPHONE ENCOUNTER
"Answer Assessment - Initial Assessment Questions  1. REASON FOR CALL or QUESTION: \"What is your reason for calling today?\" or \"How can I best help you?\" or \"What question do you have that I can help answer?\"      Pt called in asking if she could still go to lab for urine test since she is covid positive with no fever. Advised pt that she could go but to wear a mask. She is also requesting to speak with pre admission as she missed their call.    Protocols used: Information Only Call - No Triage-ADULT-[place]    "

## 2023-12-30 LAB — BACTERIA UR CULT: NORMAL

## 2024-01-05 ENCOUNTER — PREP FOR PROCEDURE (OUTPATIENT)
Dept: UROLOGY | Facility: AMBULATORY SURGERY CENTER | Age: 74
End: 2024-01-05

## 2024-01-05 DIAGNOSIS — R39.89 SUSPECTED UTI: ICD-10-CM

## 2024-01-05 DIAGNOSIS — N13.30 HYDRONEPHROSIS, UNSPECIFIED HYDRONEPHROSIS TYPE: Primary | ICD-10-CM

## 2024-01-05 NOTE — TELEPHONE ENCOUNTER
I spoke with pt this afternoon to discuss rescheduling her stent exchange on 1/12/24 due to her recently testing positive for COVID. Pt is now scheduled on 2/23/24 at the [facility]. Pt is aware that she will need to have her pre op urine culture done 2 weeks prior on 2/9/24. I offered to mail pt a new surgical packet but she declined and stated she will just change the date on her previous packet. Pt was instructed to call our office with any additional questions or concerns regarding this procedure.

## 2024-01-12 ENCOUNTER — ANESTHESIA (OUTPATIENT)
Dept: PERIOP | Facility: AMBULARY SURGERY CENTER | Age: 74
End: 2024-01-12
Payer: MEDICARE

## 2024-02-09 ENCOUNTER — ANESTHESIA EVENT (OUTPATIENT)
Dept: ANESTHESIOLOGY | Facility: HOSPITAL | Age: 74
End: 2024-02-09

## 2024-02-09 ENCOUNTER — ANESTHESIA (OUTPATIENT)
Dept: ANESTHESIOLOGY | Facility: HOSPITAL | Age: 74
End: 2024-02-09

## 2024-02-09 ENCOUNTER — APPOINTMENT (OUTPATIENT)
Dept: LAB | Facility: AMBULARY SURGERY CENTER | Age: 74
End: 2024-02-09
Payer: MEDICARE

## 2024-02-09 ENCOUNTER — APPOINTMENT (OUTPATIENT)
Dept: LAB | Facility: HOSPITAL | Age: 74
End: 2024-02-09
Payer: MEDICARE

## 2024-02-09 DIAGNOSIS — R39.89 SUSPECTED UTI: ICD-10-CM

## 2024-02-09 DIAGNOSIS — N13.30 HYDRONEPHROSIS, UNSPECIFIED HYDRONEPHROSIS TYPE: ICD-10-CM

## 2024-02-09 PROCEDURE — 87086 URINE CULTURE/COLONY COUNT: CPT

## 2024-02-10 LAB — BACTERIA UR CULT: NORMAL

## 2024-02-22 NOTE — PRE-PROCEDURE INSTRUCTIONS
Pre-Surgery Instructions:   Medication Instructions    acetaminophen (TYLENOL) 325 mg tablet Uses PRN- OK to take day of surgery    amLODIPine (NORVASC) 5 mg tablet Take day of surgery.    metoprolol succinate (TOPROL-XL) 25 mg 24 hr tablet Take day of surgery.   Medication instructions for day surgery reviewed. Please use only a sip of water to take your instructed medications. Avoid all over the counter vitamins, supplements and NSAIDS for one week prior to surgery per anesthesia guidelines. Tylenol is ok to take as needed.     You will receive a call one business day prior to surgery with an arrival time and hospital directions. If your surgery is scheduled on a Monday, the hospital will be calling you on the Friday prior to your surgery. If you have not heard from anyone by 8pm, please call the hospital supervisor through the hospital  at [phone]. ([place] [phone] or [place] [phone]).    Do not eat or drink anything after midnight the night before your surgery, including candy, mints, lifesavers, or chewing gum. Do not drink alcohol 24hrs before your surgery. Try not to smoke at least 24hrs before your surgery.       Follow the pre surgery showering instructions as listed in the “My Surgical Experience Booklet” or otherwise provided by your surgeon's office. Do not use a blade to shave the surgical area 1 week before surgery. It is okay to use a clean electric clippers up to 24 hours before surgery. Do not apply any lotions, creams, including makeup, cologne, deodorant, or perfumes after showering on the day of your surgery. Do not use dry shampoo, hair spray, hair gel, or any type of hair products.     No contact lenses, eye make-up, or artificial eyelashes. Remove nail polish, including gel polish, and any artificial, gel, or acrylic nails if possible. Remove all jewelry including rings and body piercing jewelry.     Wear causal clothing that is easy to take on and off. Consider your  type of surgery.    Keep any valuables, jewelry, piercings at home. Please bring any specially ordered equipment (sling, braces) if indicated.    Arrange for a responsible person to drive you to and from the hospital on the day of your surgery. Please confirm the visitor policy for the day of your procedure when you receive your phone call with an arrival time.     Call the surgeon's office with any new illnesses, exposures, or additional questions prior to surgery.    Please reference your “My Surgical Experience Booklet” for additional information to prepare for your upcoming surgery.

## 2024-02-23 ENCOUNTER — APPOINTMENT (OUTPATIENT)
Dept: RADIOLOGY | Facility: AMBULARY SURGERY CENTER | Age: 74
End: 2024-02-23
Payer: MEDICARE

## 2024-02-23 ENCOUNTER — HOSPITAL ENCOUNTER (OUTPATIENT)
Facility: AMBULARY SURGERY CENTER | Age: 74
Setting detail: OUTPATIENT SURGERY
Discharge: HOME | End: 2024-02-23
Attending: UROLOGY | Admitting: UROLOGY
Payer: MEDICARE

## 2024-02-23 VITALS
SYSTOLIC BLOOD PRESSURE: 117 MMHG | DIASTOLIC BLOOD PRESSURE: 56 MMHG | TEMPERATURE: 97.8 F | BODY MASS INDEX: 24.41 KG/M2 | WEIGHT: 143 LBS | RESPIRATION RATE: 18 BRPM | OXYGEN SATURATION: 96 % | HEIGHT: 64 IN | HEART RATE: 66 BPM

## 2024-02-23 DIAGNOSIS — N13.5 UPJ (URETEROPELVIC JUNCTION) OBSTRUCTION: Primary | ICD-10-CM

## 2024-02-23 PROCEDURE — 74420 UROGRAPHY RTRGR +-KUB: CPT

## 2024-02-23 PROCEDURE — C1758 CATHETER, URETERAL: HCPCS | Performed by: UROLOGY

## 2024-02-23 PROCEDURE — C2617 STENT, NON-COR, TEM W/O DEL: HCPCS | Performed by: UROLOGY

## 2024-02-23 PROCEDURE — 52332 CYSTOSCOPY AND TREATMENT: CPT | Performed by: UROLOGY

## 2024-02-23 PROCEDURE — C1769 GUIDE WIRE: HCPCS | Performed by: UROLOGY

## 2024-02-23 PROCEDURE — NC001 PR NO CHARGE: Performed by: UROLOGY

## 2024-02-23 DEVICE — IMPLANTABLE DEVICE: Type: IMPLANTABLE DEVICE | Site: URETER | Status: FUNCTIONAL

## 2024-02-23 RX ORDER — MAGNESIUM HYDROXIDE 1200 MG/15ML
LIQUID ORAL AS NEEDED
Status: DISCONTINUED | OUTPATIENT
Start: 2024-02-23 | End: 2024-02-23 | Stop reason: HOSPADM

## 2024-02-23 RX ORDER — MEPERIDINE HYDROCHLORIDE 25 MG/ML
12.5 INJECTION INTRAMUSCULAR; INTRAVENOUS; SUBCUTANEOUS
Status: DISCONTINUED | OUTPATIENT
Start: 2024-02-23 | End: 2024-02-23 | Stop reason: HOSPADM

## 2024-02-23 RX ORDER — LIDOCAINE HCL/PF 100 MG/5ML
SYRINGE (ML) INJECTION AS NEEDED
Status: DISCONTINUED | OUTPATIENT
Start: 2024-02-23 | End: 2024-02-23

## 2024-02-23 RX ORDER — FENTANYL CITRATE/PF 50 MCG/ML
25 SYRINGE (ML) INJECTION
Status: DISCONTINUED | OUTPATIENT
Start: 2024-02-23 | End: 2024-02-23 | Stop reason: HOSPADM

## 2024-02-23 RX ORDER — HYDROMORPHONE HCL/PF 1 MG/ML
0.5 SYRINGE (ML) INJECTION
Status: DISCONTINUED | OUTPATIENT
Start: 2024-02-23 | End: 2024-02-23 | Stop reason: HOSPADM

## 2024-02-23 RX ORDER — SODIUM CHLORIDE, SODIUM LACTATE, POTASSIUM CHLORIDE, CALCIUM CHLORIDE 600; 310; 30; 20 MG/100ML; MG/100ML; MG/100ML; MG/100ML
INJECTION, SOLUTION INTRAVENOUS CONTINUOUS PRN
Status: DISCONTINUED | OUTPATIENT
Start: 2024-02-23 | End: 2024-02-23

## 2024-02-23 RX ORDER — ONDANSETRON 2 MG/ML
INJECTION INTRAMUSCULAR; INTRAVENOUS AS NEEDED
Status: DISCONTINUED | OUTPATIENT
Start: 2024-02-23 | End: 2024-02-23

## 2024-02-23 RX ORDER — PROPOFOL 10 MG/ML
INJECTION, EMULSION INTRAVENOUS AS NEEDED
Status: DISCONTINUED | OUTPATIENT
Start: 2024-02-23 | End: 2024-02-23

## 2024-02-23 RX ORDER — DEXAMETHASONE SODIUM PHOSPHATE 10 MG/ML
INJECTION, SOLUTION INTRAMUSCULAR; INTRAVENOUS AS NEEDED
Status: DISCONTINUED | OUTPATIENT
Start: 2024-02-23 | End: 2024-02-23

## 2024-02-23 RX ORDER — DOCUSATE SODIUM 100 MG/1
100 CAPSULE, LIQUID FILLED ORAL 2 TIMES DAILY
Qty: 30 CAPSULE | Refills: 0 | Status: SHIPPED | OUTPATIENT
Start: 2024-02-23 | End: 2024-03-09

## 2024-02-23 RX ORDER — FENTANYL CITRATE 50 UG/ML
INJECTION, SOLUTION INTRAMUSCULAR; INTRAVENOUS AS NEEDED
Status: DISCONTINUED | OUTPATIENT
Start: 2024-02-23 | End: 2024-02-23

## 2024-02-23 RX ORDER — ALBUTEROL SULFATE 2.5 MG/3ML
2.5 SOLUTION RESPIRATORY (INHALATION) ONCE AS NEEDED
Status: DISCONTINUED | OUTPATIENT
Start: 2024-02-23 | End: 2024-02-23 | Stop reason: HOSPADM

## 2024-02-23 RX ORDER — PROMETHAZINE HYDROCHLORIDE 25 MG/ML
12.5 INJECTION, SOLUTION INTRAMUSCULAR; INTRAVENOUS ONCE AS NEEDED
Status: DISCONTINUED | OUTPATIENT
Start: 2024-02-23 | End: 2024-02-23 | Stop reason: HOSPADM

## 2024-02-23 RX ORDER — ACETAMINOPHEN 325 MG/1
650 TABLET ORAL EVERY 4 HOURS PRN
Start: 2024-02-23

## 2024-02-23 RX ORDER — ONDANSETRON 2 MG/ML
4 INJECTION INTRAMUSCULAR; INTRAVENOUS ONCE AS NEEDED
Status: DISCONTINUED | OUTPATIENT
Start: 2024-02-23 | End: 2024-02-23 | Stop reason: HOSPADM

## 2024-02-23 RX ORDER — PHENAZOPYRIDINE HYDROCHLORIDE 200 MG/1
200 TABLET, FILM COATED ORAL 3 TIMES DAILY PRN
Qty: 10 TABLET | Refills: 0 | Status: SHIPPED | OUTPATIENT
Start: 2024-02-23 | End: 2024-02-26

## 2024-02-23 RX ORDER — CEFAZOLIN SODIUM 2 G/50ML
2000 SOLUTION INTRAVENOUS ONCE
Status: COMPLETED | OUTPATIENT
Start: 2024-02-23 | End: 2024-02-23

## 2024-02-23 RX ORDER — OXYCODONE HYDROCHLORIDE 5 MG/1
5 TABLET ORAL EVERY 4 HOURS PRN
Status: DISCONTINUED | OUTPATIENT
Start: 2024-02-23 | End: 2024-02-23 | Stop reason: HOSPADM

## 2024-02-23 RX ORDER — CIPROFLOXACIN 2 MG/ML
400 INJECTION, SOLUTION INTRAVENOUS ONCE
Status: DISCONTINUED | OUTPATIENT
Start: 2024-02-23 | End: 2024-02-23

## 2024-02-23 RX ADMIN — CEFAZOLIN SODIUM 1000 MG: 2 SOLUTION INTRAVENOUS at 08:03

## 2024-02-23 RX ADMIN — ONDANSETRON 4 MG: 2 INJECTION INTRAMUSCULAR; INTRAVENOUS at 08:10

## 2024-02-23 RX ADMIN — FENTANYL CITRATE 50 MCG: 50 INJECTION INTRAMUSCULAR; INTRAVENOUS at 08:01

## 2024-02-23 RX ADMIN — DEXAMETHASONE SODIUM PHOSPHATE 10 MG: 10 INJECTION, SOLUTION INTRAMUSCULAR; INTRAVENOUS at 08:10

## 2024-02-23 RX ADMIN — PROPOFOL 120 MG: 10 INJECTION, EMULSION INTRAVENOUS at 08:08

## 2024-02-23 RX ADMIN — SODIUM CHLORIDE, SODIUM LACTATE, POTASSIUM CHLORIDE, AND CALCIUM CHLORIDE: .6; .31; .03; .02 INJECTION, SOLUTION INTRAVENOUS at 08:06

## 2024-02-23 RX ADMIN — FENTANYL CITRATE 50 MCG: 50 INJECTION INTRAMUSCULAR; INTRAVENOUS at 08:08

## 2024-02-23 RX ADMIN — LIDOCAINE HYDROCHLORIDE 40 MG: 20 INJECTION INTRAVENOUS at 08:08

## 2024-02-23 NOTE — OP NOTE
Operative Note     PATIENT:  Katerina Reddy (MRN [id number])    DATE OF PROCEDURE:   2/23/2024    PRE-OP DIAGNOSIS:   1) chronic left UPJ obstruction  2) severe hydronephrosis  3) indwelling left ureteral stent    POST-OP DIAGNOSIS:   1) chronic left UPJ obstruction  2) severe hydronephrosis  3) indwelling left ureteral stent    PROCEDURES PERFORMED:  1) Cystoscopy  2) Left retrograde pyelography with fluoroscopic interpretation  3) Left ureteral stent placement    SURGEON:  Ras Fisher MD    ASSISTANTS:  There were no qualified teaching residents to assist with this case    ANESTHESIA: General     COMPLICATIONS:   None    ANTIBIOTICS:  Ancef    INTRAOPERATIVE THROMBOEMBOLISM PROPHYLAXIS:  Pneumatic compression stockings      FINDINGS:  Successful stent exchange.  Severe hydronephrosis with blunting of the calices      INDICATIONS FOR PROCEDURE:  Katerina Reddy is an 73 y.o. old female with left UPJ obstruction.  She has been managed for some time with chronic ureteral stent exchange..  After discussing the options, the patient elected to undergo ureteral stent exchange.  We discussed the procedure in detail, the alternatives, and the risks, and they signed informed consent to proceed.    PROCEDURE IN DETAIL:   The patient was identified and brought to the OR.  Antibiotic prophylaxis and DVT prophylaxis were administered.  They were placed in the comfortable dorsal lithotomy position with care to pad all pressure points.  They were prepped and draped in the usual sterile fashion using hibiclens.  A surgical time out was performed with all in the room in agreement with the correct patient, procedure, indications, and laterality.  A 21-Turkish rigid cystoscope was used to enter the bladder.  The bladder was inspected in its entirety and there were no lesions noted.  The ureteral orifices were identified in their normal orthotopic positions.     The Left ureteral orifice was identified and a 5 Fr open ended  catheter was placed into the ureteral orifice.    A retrograde pyelogram was performed with injection of 50/50 Isovue with the findings as described above.  A Sensor wire was up to the kidney under fluoroscopic guidance.  The open-ended catheter is navigated atop the pre-placed wire and advanced to the renal pelvis.       the distance between the UVJ and the UPJ was measured and appropriately sized stent was selected.  The JJ stent was then passed up the wire  under fluoroscopic guidance into the  kidney with a good curl noted in the kidney and in the bladder.   The bladder was drained.        The patient was placed back supine, awakened from general anesthesia and brought to recovery room in stable condition.    SPECIMENS:   * No orders in the log *     IMPLANTS:   Implant Name Type Inv. Item Serial No.  Lot No. LRB No. Used Action   STENT URETERAL 6FR 22CM INLAY OPTIMA W/HYDROGLIDE GDWR - [id number]  STENT URETERAL 6FR 22CM INLAY OPTIMA W/HYDROGLIDE GDWR  [employer]  Left 1 Implanted        COMPLICATIONS: None    DISPOSITION: PACU    PLAN: We will plan her next ureteral stent exchange in 8 months time

## 2024-02-23 NOTE — ANESTHESIA POSTPROCEDURE EVALUATION
Post-Op Assessment Note    CV Status:  Stable    Pain management: adequate       Mental Status:  Alert and awake   Hydration Status:  Euvolemic   PONV Controlled:  Controlled   Airway Patency:  Patent     Post Op Vitals Reviewed: Yes    No anethesia notable event occurred.    Staff: Anesthesiologist, CRNA               BP   133/78   Temp 98   Pulse 78   Resp 15   SpO2 100

## 2024-02-23 NOTE — H&P
UROLOGY HISTORY AND PHYSICAL     Patient Identifiers: Katerina Reddy (MRN [id number])      Date of Service: 2/23/2024        ASSESSMENT:     73 y.o. old female with  left UPJ obstruction managed conservatively with chronic ureteral stent exchange..      PLAN:     Left ureteral stent exchange      History of Present Illness:     Katerina Reddy is a 73 y.o. old with a history of left UPJ obstruction    Past Medical, Past Surgical History:     Past Medical History:   Diagnosis Date    Bowel obstruction (HCC)     Chronic kidney disease     GERD (gastroesophageal reflux disease)     Hernia of abdominal cavity     Hypertension     Lyme disease    :    Past Surgical History:   Procedure Laterality Date    ABDOMINAL SURGERY      SCAR TISSUE AROUND BOWEL RESECTED    CHOLECYSTECTOMY      FL RETROGRADE PYELOGRAM  10/21/2021    FL RETROGRADE PYELOGRAM  02/25/2022    FL RETROGRADE PYELOGRAM  08/22/2022    FL RETROGRADE PYELOGRAM  11/21/2022    FL RETROGRADE PYELOGRAM  02/10/2023    FL RETROGRADE PYELOGRAM  6/23/2023    HYSTERECTOMY      KS CYSTO BLADDER W/URETERAL CATHETERIZATION Bilateral 10/21/2021    Procedure: CYSTOSCOPY RETROGRADE PYELOGRAM WITH INSERTION STENT URETERAL;  Surgeon: Ras Fisher MD;  [facility];  Service: Urology    KS CYSTO BLADDER W/URETERAL CATHETERIZATION Left 02/25/2022    Procedure: CYSTOSCOPY RETROGRADE PYELOGRAM WITH INSERTION STENT URETERAL; DIAGNOSTIC URETEROSCOPY;  Surgeon: Ras Fisher MD;  [facility];  Service: Urology    KS CYSTO BLADDER W/URETERAL CATHETERIZATION Left 08/22/2022    Procedure: CYSTOSCOPY RETROGRADE PYELOGRAM WITH EXCHANGE STENT URETERAL;  Surgeon: Vik Young MD;  [facility];  Service: Urology    KS CYSTO BLADDER W/URETERAL CATHETERIZATION Left 11/21/2022    Procedure: CYSTOSCOPY RETROGRADE PYELOGRAM WITH INSERTION STENT URETERAL;  Surgeon: Ras Fisher MD;  [facility];  Service: Urology    KS CYSTO  BLADDER W/URETERAL CATHETERIZATION Left 2023    Procedure: CYSTOSCOPY RETROGRADE PYELOGRAM WITH exchange STENT URETERAL;  Surgeon: Ras Fisher MD;  [facility];  Service: Urology    FL CYSTO W/INSERT URETERAL STENT Left 02/10/2023    Procedure: EXCHANGE STENT URETERAL;  Surgeon: Ras Fisher MD;  [facility];  Service: Urology    FL CYSTO W/INSERT URETERAL STENT Left 2023    Procedure: EXCHANGE STENT URETERAL;  Surgeon: Ras Fisher MD;  [facility];  Service: Urology   :    Medications, Allergies:     Current Facility-Administered Medications:     ceFAZolin (ANCEF) IVPB (premix in dextrose) 2,000 mg 50 mL, 2,000 mg, Intravenous, Once, Ras Fisher MD    Allergies:  Allergies   Allergen Reactions    Bactrim [Sulfamethoxazole-Trimethoprim] Other (See Comments)     Her mom  from [place], Family histor?    Sulfa Antibiotics Other (See Comments)     Causes sores on skin - per pt    Cefixime Other (See Comments)     30 years ago, unclear reaction. Believes allergy listed due to C Dif  Tolerated Ceftriaxone 10/21   :    Social and Family History:   Social History:   Social History     Tobacco Use    Smoking status: Never    Smokeless tobacco: Never   Vaping Use    Vaping status: Never Used   Substance Use Topics    Alcohol use: Yes     Comment: socially    Drug use: No   .    Social History     Tobacco Use   Smoking Status Never   Smokeless Tobacco Never       Family History:  Family History   Problem Relation Age of Onset    Stroke Father    :     Review of Systems:     General: Fever, chills, or night sweats: negative  Cardiac: Negative for chest pain.    Pulmonary: Negative for shortness of breath.  Gastrointestinal: Abdominal pain negative  Nausea, vomiting, or diarrhea negative  Genitourinary: See HPI above.  Patient does nothave hematuria.  All other systems queried were negative.    Physical Exam:   General: Patient is pleasant and in  "NAD. Awake and alert  /70   Pulse 58   Temp 97.8 °F (36.6 °C) (Temporal)   Resp 18   Ht 5' 4\" (1.626 m)   Wt 64.9 kg (143 lb)   SpO2 99%   BMI 24.55 kg/m²   HEENT:  Normocephalic atraumatic  Cardiac:  Regular rate and rhythm, Peripheral edema: negative  Pulmonary: Non-labored breathing, CTAB  Abdomen: Soft, non-tender, non-distended.  No surgical scars.  No masses, tenderness, hernias noted.    Genitourinary: negative CVA tenderness, neg suprapubic tenderness.  Extremities: normal movement in all 4       Labs:     Lab Results   Component Value Date    HGB 10.4 (L) 11/23/2022    HCT 32.6 (L) 11/23/2022    WBC 6.74 11/23/2022     11/23/2022   ]    Lab Results   Component Value Date    K 4.9 06/19/2023     06/19/2023    CO2 22 06/19/2023    BUN 11 06/19/2023    CREATININE 0.75 06/19/2023    CALCIUM 8.9 06/19/2023   ]    Imaging:   I personally reviewed the images and report of the following studies, and reviewed them with the patient:        Thank you for allowing me to participate in this patients’ care.  Please do not hesitate to call with any additional questions.  Ras Fisher MD      "

## 2024-02-23 NOTE — ANESTHESIA PREPROCEDURE EVALUATION
Procedure:  CYSTOSCOPY RETROGRADE PYELOGRAM WITH EXCHANGE STENT URETERAL (Left: Bladder)    Relevant Problems   CARDIO   (+) Essential hypertension   (+) Nonrheumatic aortic valve insufficiency   (+) SVT (supraventricular tachycardia)      GI/HEPATIC   (+) GERD (gastroesophageal reflux disease)      /RENAL   (+) Stage 2 chronic kidney disease      GYN   (+) Adenocarcinoma of endometrium, stage 3 (HCC)   (+) Uterine cancer (HCC)        Physical Exam    Airway    Mallampati score: I  TM Distance: >3 FB  Neck ROM: full     Dental       Cardiovascular  Cardiovascular exam normal    Pulmonary  Pulmonary exam normal     Other Findings  post-pubertal.      Anesthesia Plan  ASA Score- 2     Anesthesia Type- general with ASA Monitors.         Additional Monitors:     Airway Plan:            Plan Factors-Exercise tolerance (METS): >4 METS.    Chart reviewed. EKG reviewed. Imaging results reviewed. Existing labs reviewed. Patient summary reviewed.                  Induction- intravenous.    Postoperative Plan- Plan for postoperative opioid use. Planned trial extubation    Informed Consent- Anesthetic plan and risks discussed with patient.  I personally reviewed this patient with the CRNA. Discussed and agreed on the Anesthesia Plan with the CRNA..

## 2024-07-26 ENCOUNTER — HOSPITAL ENCOUNTER (EMERGENCY)
Facility: HOSPITAL | Age: 74
Discharge: HOME | End: 2024-07-26
Attending: EMERGENCY MEDICINE
Payer: MEDICARE

## 2024-07-26 ENCOUNTER — APPOINTMENT (EMERGENCY)
Dept: CT IMAGING | Facility: HOSPITAL | Age: 74
End: 2024-07-26
Payer: MEDICARE

## 2024-07-26 VITALS
TEMPERATURE: 98.2 F | OXYGEN SATURATION: 97 % | DIASTOLIC BLOOD PRESSURE: 72 MMHG | SYSTOLIC BLOOD PRESSURE: 168 MMHG | RESPIRATION RATE: 18 BRPM | HEART RATE: 64 BPM

## 2024-07-26 DIAGNOSIS — R10.9 ABDOMINAL PAIN, UNSPECIFIED ABDOMINAL LOCATION: Primary | ICD-10-CM

## 2024-07-26 LAB
ALBUMIN SERPL BCG-MCNC: 4.3 G/DL (ref 3.5–5)
ALP SERPL-CCNC: 73 U/L (ref 34–104)
ALT SERPL W P-5'-P-CCNC: 9 U/L (ref 7–52)
ANION GAP SERPL CALCULATED.3IONS-SCNC: 9 MMOL/L (ref 4–13)
AST SERPL W P-5'-P-CCNC: 12 U/L (ref 13–39)
BACTERIA UR QL AUTO: ABNORMAL /HPF
BASOPHILS # BLD AUTO: 0.04 THOUSANDS/ÂΜL (ref 0–0.1)
BASOPHILS NFR BLD AUTO: 1 % (ref 0–1)
BILIRUB SERPL-MCNC: 1.28 MG/DL (ref 0.2–1)
BILIRUB UR QL STRIP: NEGATIVE
BUN SERPL-MCNC: 15 MG/DL (ref 5–25)
CALCIUM SERPL-MCNC: 9.1 MG/DL (ref 8.4–10.2)
CHLORIDE SERPL-SCNC: 106 MMOL/L (ref 96–108)
CLARITY UR: CLEAR
CO2 SERPL-SCNC: 24 MMOL/L (ref 21–32)
COLOR UR: ABNORMAL
CREAT SERPL-MCNC: 0.84 MG/DL (ref 0.6–1.3)
EOSINOPHIL # BLD AUTO: 0.2 THOUSAND/ÂΜL (ref 0–0.61)
EOSINOPHIL NFR BLD AUTO: 4 % (ref 0–6)
ERYTHROCYTE [DISTWIDTH] IN BLOOD BY AUTOMATED COUNT: 12.9 % (ref 11.6–15.1)
EST. AVERAGE GLUCOSE BLD GHB EST-MCNC: 123 MG/DL
GFR SERPL CREATININE-BSD FRML MDRD: 68 ML/MIN/1.73SQ M
GLUCOSE SERPL-MCNC: 141 MG/DL (ref 65–140)
GLUCOSE UR STRIP-MCNC: NEGATIVE MG/DL
HBA1C MFR BLD: 5.9 %
HCT VFR BLD AUTO: 40.3 % (ref 34.8–46.1)
HGB BLD-MCNC: 13.1 G/DL (ref 11.5–15.4)
HGB UR QL STRIP.AUTO: ABNORMAL
HYALINE CASTS #/AREA URNS LPF: ABNORMAL /LPF
IMM GRANULOCYTES # BLD AUTO: 0.03 THOUSAND/UL (ref 0–0.2)
IMM GRANULOCYTES NFR BLD AUTO: 1 % (ref 0–2)
KETONES UR STRIP-MCNC: NEGATIVE MG/DL
LEUKOCYTE ESTERASE UR QL STRIP: ABNORMAL
LIPASE SERPL-CCNC: 27 U/L (ref 11–82)
LYMPHOCYTES # BLD AUTO: 1.34 THOUSANDS/ÂΜL (ref 0.6–4.47)
LYMPHOCYTES NFR BLD AUTO: 29 % (ref 14–44)
MCH RBC QN AUTO: 32.7 PG (ref 26.8–34.3)
MCHC RBC AUTO-ENTMCNC: 32.5 G/DL (ref 31.4–37.4)
MCV RBC AUTO: 101 FL (ref 82–98)
MONOCYTES # BLD AUTO: 0.41 THOUSAND/ÂΜL (ref 0.17–1.22)
MONOCYTES NFR BLD AUTO: 9 % (ref 4–12)
NEUTROPHILS # BLD AUTO: 2.65 THOUSANDS/ÂΜL (ref 1.85–7.62)
NEUTS SEG NFR BLD AUTO: 56 % (ref 43–75)
NITRITE UR QL STRIP: NEGATIVE
NON-SQ EPI CELLS URNS QL MICRO: ABNORMAL /HPF
NRBC BLD AUTO-RTO: 0 /100 WBCS
PH UR STRIP.AUTO: 5.5 [PH]
PLATELET # BLD AUTO: 229 THOUSANDS/UL (ref 149–390)
PMV BLD AUTO: 10.5 FL (ref 8.9–12.7)
POTASSIUM SERPL-SCNC: 3.9 MMOL/L (ref 3.5–5.3)
PROT SERPL-MCNC: 6.8 G/DL (ref 6.4–8.4)
PROT UR STRIP-MCNC: NEGATIVE MG/DL
RBC # BLD AUTO: 4.01 MILLION/UL (ref 3.81–5.12)
RBC #/AREA URNS AUTO: ABNORMAL /HPF
SODIUM SERPL-SCNC: 139 MMOL/L (ref 135–147)
SP GR UR STRIP.AUTO: 1.02 (ref 1–1.03)
UROBILINOGEN UR STRIP-ACNC: <2 MG/DL
WBC # BLD AUTO: 4.67 THOUSAND/UL (ref 4.31–10.16)
WBC #/AREA URNS AUTO: ABNORMAL /HPF

## 2024-07-26 PROCEDURE — 83690 ASSAY OF LIPASE: CPT | Performed by: PHYSICIAN ASSISTANT

## 2024-07-26 PROCEDURE — 85025 COMPLETE CBC W/AUTO DIFF WBC: CPT | Performed by: PHYSICIAN ASSISTANT

## 2024-07-26 PROCEDURE — 99284 EMERGENCY DEPT VISIT MOD MDM: CPT | Performed by: PHYSICIAN ASSISTANT

## 2024-07-26 PROCEDURE — 99284 EMERGENCY DEPT VISIT MOD MDM: CPT

## 2024-07-26 PROCEDURE — 87086 URINE CULTURE/COLONY COUNT: CPT | Performed by: PHYSICIAN ASSISTANT

## 2024-07-26 PROCEDURE — 83036 HEMOGLOBIN GLYCOSYLATED A1C: CPT | Performed by: PHYSICIAN ASSISTANT

## 2024-07-26 PROCEDURE — 80053 COMPREHEN METABOLIC PANEL: CPT | Performed by: PHYSICIAN ASSISTANT

## 2024-07-26 PROCEDURE — 36415 COLL VENOUS BLD VENIPUNCTURE: CPT | Performed by: PHYSICIAN ASSISTANT

## 2024-07-26 PROCEDURE — 74177 CT ABD & PELVIS W/CONTRAST: CPT

## 2024-07-26 PROCEDURE — 81001 URINALYSIS AUTO W/SCOPE: CPT | Performed by: PHYSICIAN ASSISTANT

## 2024-07-26 RX ADMIN — IOHEXOL 80 ML: 350 INJECTION, SOLUTION INTRAVENOUS at 15:06

## 2024-07-27 LAB — BACTERIA UR CULT: NORMAL

## 2024-07-27 NOTE — ED PROVIDER NOTES
History  Chief Complaint   Patient presents with    Possible UTI     Pt c/o lower abdominal pressure x3 weeks. Concerned for possible UTI. 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      73-year-old female presents emergency department with complaints of lower abdominal pressure.  States that she has had ongoing issues with pelvic pressure for the past several months.  Thinks it may be related to UTI.  Recently finished a course of Macrobid.  Previous urine cultures have not shown significant bacterial growth.  Follows with Dr. Fisher for left-sided ureteral stent due to be changed next month.  She denies fevers.  No flank pain.      History provided by:  Patient   used: No        Prior to Admission Medications   Prescriptions Last Dose Informant Patient Reported? Taking?   acetaminophen (TYLENOL) 325 mg tablet   No No   Sig: Take 2 tablets (650 mg total) by mouth every 4 (four) hours as needed for mild pain   acetaminophen (TYLENOL) 325 mg tablet   No No   Sig: Take 2 tablets (650 mg total) by mouth every 4 (four) hours as needed for mild pain   amLODIPine (NORVASC) 5 mg tablet   Yes No   Sig: Take 5 mg by mouth 2 (two) times a day   diclofenac sodium (VOLTAREN) 50 mg EC tablet   No No   Sig: Take 1 tablet (50 mg total) by mouth 3 (three) times a day for 7 days   docusate sodium (COLACE) 100 mg capsule   No No   Sig: Take 1 capsule (100 mg total) by mouth 2 (two) times a day for 15 days   metoprolol succinate (TOPROL-XL) 25 mg 24 hr tablet  Self Yes No   Sig: Take 25 mg by mouth 2 (two) times a day       Facility-Administered Medications: None       Past Medical History:   Diagnosis Date    Bowel obstruction (HCC)     Chronic kidney disease     GERD (gastroesophageal reflux disease)     Hernia of abdominal cavity     Hypertension     Lyme disease        Past Surgical History:   Procedure Laterality Date    ABDOMINAL SURGERY      SCAR TISSUE AROUND BOWEL RESECTED     CHOLECYSTECTOMY      FL RETROGRADE PYELOGRAM  10/21/2021    FL RETROGRADE PYELOGRAM  02/25/2022    FL RETROGRADE PYELOGRAM  08/22/2022    FL RETROGRADE PYELOGRAM  11/21/2022    FL RETROGRADE PYELOGRAM  02/10/2023    FL RETROGRADE PYELOGRAM  6/23/2023    FL RETROGRADE PYELOGRAM  2/23/2024    HYSTERECTOMY      MA CYSTO BLADDER W/URETERAL CATHETERIZATION Bilateral 10/21/2021    Procedure: CYSTOSCOPY RETROGRADE PYELOGRAM WITH INSERTION STENT URETERAL;  Surgeon: Ras Fisher MD;  [facility];  Service: Urology    MA CYSTO BLADDER W/URETERAL CATHETERIZATION Left 02/25/2022    Procedure: CYSTOSCOPY RETROGRADE PYELOGRAM WITH INSERTION STENT URETERAL; DIAGNOSTIC URETEROSCOPY;  Surgeon: Ras Fisher MD;  [facility];  Service: Urology    MA CYSTO BLADDER W/URETERAL CATHETERIZATION Left 08/22/2022    Procedure: CYSTOSCOPY RETROGRADE PYELOGRAM WITH EXCHANGE STENT URETERAL;  Surgeon: Vik Young MD;  [facility];  Service: Urology    MA CYSTO BLADDER W/URETERAL CATHETERIZATION Left 11/21/2022    Procedure: CYSTOSCOPY RETROGRADE PYELOGRAM WITH INSERTION STENT URETERAL;  Surgeon: Ras Fisher MD;  [facility];  Service: Urology    MA CYSTO BLADDER W/URETERAL CATHETERIZATION Left 6/23/2023    Procedure: CYSTOSCOPY RETROGRADE PYELOGRAM WITH exchange STENT URETERAL;  Surgeon: Ras Fisher MD;  [facility];  Service: Urology    MA CYSTO BLADDER W/URETERAL CATHETERIZATION Left 2/23/2024    Procedure: CYSTOSCOPY RETROGRADE PYELOGRAM WITH EXCHANGE STENT URETERAL;  Surgeon: Ras Fisher MD;  [facility];  Service: Urology    MA CYSTO W/INSERT URETERAL STENT Left 02/10/2023    Procedure: EXCHANGE STENT URETERAL;  Surgeon: Ras Fisher MD;  [facility];  Service: Urology    MA CYSTO W/INSERT URETERAL STENT Left 6/23/2023    Procedure: EXCHANGE STENT URETERAL;  Surgeon: Ras Fisher MD;  [facility];  Service: Urology       Family History   Problem Relation Age of Onset    Stroke Father      I have reviewed and agree with the history as documented.    E-Cigarette/Vaping    E-Cigarette Use Never User      E-Cigarette/Vaping Substances    Nicotine No     THC No     CBD No     Flavoring No     Other No     Unknown No      Social History     Tobacco Use    Smoking status: Never    Smokeless tobacco: Never   Vaping Use    Vaping status: Never Used   Substance Use Topics    Alcohol use: Yes     Comment: socially    Drug use: No       Review of Systems   Constitutional:  Negative for activity change, appetite change, chills and fever.   HENT:  Negative for congestion, dental problem, drooling, ear discharge, ear pain, mouth sores, nosebleeds, rhinorrhea, sore throat and trouble swallowing.    Eyes:  Negative for pain, discharge and itching.   Respiratory:  Negative for cough, chest tightness, shortness of breath and wheezing.    Cardiovascular:  Negative for chest pain and palpitations.   Gastrointestinal:  Negative for blood in stool, constipation, diarrhea, nausea and vomiting.        Abdominal discomfort     Endocrine: Negative for cold intolerance and heat intolerance.   Genitourinary:  Negative for difficulty urinating, dysuria, flank pain, frequency and urgency.   Skin:  Negative for rash and wound.   Allergic/Immunologic: Negative for food allergies and immunocompromised state.   Neurological:  Negative for dizziness, seizures, syncope, weakness, numbness and headaches.   Psychiatric/Behavioral:  Negative for agitation, behavioral problems and confusion.        Physical Exam  Physical Exam  Vitals and nursing note reviewed.   Constitutional:       General: She is not in acute distress.     Appearance: She is not diaphoretic.   HENT:      Head: Normocephalic and atraumatic.      Right Ear: External ear normal.      Left Ear: External ear normal.      Mouth/Throat:      Mouth: Oropharynx is clear  and moist.      Pharynx: No oropharyngeal exudate.   Eyes:      Conjunctiva/sclera: Conjunctivae normal.   Neck:      Vascular: No JVD.      Trachea: No tracheal deviation.   Cardiovascular:      Rate and Rhythm: Normal rate and regular rhythm.      Heart sounds: Normal heart sounds. No murmur heard.     No friction rub. No gallop.   Pulmonary:      Effort: No respiratory distress.      Breath sounds: No wheezing or rales.   Chest:      Chest wall: No tenderness.   Abdominal:      General: Bowel sounds are normal. There is no distension.      Palpations: Abdomen is soft.      Tenderness: There is abdominal tenderness in the suprapubic area. There is no guarding.   Musculoskeletal:         General: No tenderness, deformity or edema. Normal range of motion.   Lymphadenopathy:      Cervical: No cervical adenopathy.   Skin:     General: Skin is warm and dry.      Findings: No erythema or rash.   Neurological:      General: No focal deficit present.      Mental Status: She is alert and oriented to person, place, and time.   Psychiatric:         Mood and Affect: Mood and affect and mood normal.         Behavior: Behavior normal.         Vital Signs  ED Triage Vitals   Temperature Pulse Respirations Blood Pressure SpO2   07/26/24 1316 07/26/24 1316 07/26/24 1316 07/26/24 1316 07/26/24 1316   98.2 °F (36.8 °C) 69 18 170/77 98 %      Temp Source Heart Rate Source Patient Position - Orthostatic VS BP Location FiO2 (%)   07/26/24 1316 07/26/24 1316 07/26/24 1316 07/26/24 1316 --   Oral Monitor Sitting Right arm       Pain Score       07/26/24 1539       7           Vitals:    07/26/24 1316 07/26/24 1539   BP: 170/77 168/72   Pulse: 69 64   Patient Position - Orthostatic VS: Sitting          Visual Acuity      ED Medications  Medications   iohexol (OMNIPAQUE) 350 MG/ML injection (MULTI-DOSE) 80 mL (80 mL Intravenous Given 7/26/24 1506)       Diagnostic Studies  Results Reviewed       Procedure Component Value Units Date/Time     Hemoglobin A1C [[id number]]  (Abnormal) Collected: 07/26/24 1410    Lab Status: Final result Specimen: Blood from Arm, Left Updated: 07/26/24 1939     Hemoglobin A1C 5.9 %       mg/dl     Urine Microscopic [[id number]]  (Abnormal) Collected: 07/26/24 1446    Lab Status: Final result Specimen: Urine, Clean Catch Updated: 07/26/24 1459     RBC, UA 4-10 /hpf      WBC, UA 10-20 /hpf      Epithelial Cells Occasional /hpf      Bacteria, UA Occasional /hpf      Hyaline Casts, UA 0-3 /lpf     Urine culture [[id number]] Collected: 07/26/24 1446    Lab Status: In process Specimen: Urine, Clean Catch Updated: 07/26/24 1459    UA w Reflex to Microscopic w Reflex to Culture [[id number]]  (Abnormal) Collected: 07/26/24 1446    Lab Status: Final result Specimen: Urine, Clean Catch Updated: 07/26/24 1455     Color, UA Light Yellow     Clarity, UA Clear     Specific Gravity, UA 1.016     pH, UA 5.5     Leukocytes, UA Moderate     Nitrite, UA Negative     Protein, UA Negative mg/dl      Glucose, UA Negative mg/dl      Ketones, UA Negative mg/dl      Urobilinogen, UA <2.0 mg/dl      Bilirubin, UA Negative     Occult Blood, UA Trace    Comprehensive metabolic panel [[id number]]  (Abnormal) Collected: 07/26/24 1410    Lab Status: Final result Specimen: Blood from Arm, Left Updated: 07/26/24 1437     Sodium 139 mmol/L      Potassium 3.9 mmol/L      Chloride 106 mmol/L      CO2 24 mmol/L      ANION GAP 9 mmol/L      BUN 15 mg/dL      Creatinine 0.84 mg/dL      Glucose 141 mg/dL      Calcium 9.1 mg/dL      AST 12 U/L      ALT 9 U/L      Alkaline Phosphatase 73 U/L      Total Protein 6.8 g/dL      Albumin 4.3 g/dL      Total Bilirubin 1.28 mg/dL      eGFR 68 ml/min/1.73sq m     Narrative:      National Kid[employer] Foundation guidelines for Chronic Kidney Disease (CKD):     Stage 1 with normal or high GFR (GFR > 90 mL/min/1.73 square meters)    Stage 2 Mild CKD (GFR = 60-89 mL/min/1.73 square meters)    Stage 3A Moderate CKD (GFR =  45-59 mL/min/1.73 square meters)    Stage 3B Moderate CKD (GFR = 30-44 mL/min/1.73 square meters)    Stage 4 Severe CKD (GFR = 15-29 mL/min/1.73 square meters)    Stage 5 End Stage CKD (GFR <15 mL/min/1.73 square meters)  Note: GFR calculation is accurate only with a steady state creatinine    Lipase [[id number]]  (Normal) Collected: 07/26/24 1410    Lab Status: Final result Specimen: Blood from Arm, Left Updated: 07/26/24 1437     Lipase 27 u/L     CBC and differential [[id number]]  (Abnormal) Collected: 07/26/24 1410    Lab Status: Final result Specimen: Blood from Arm, Left Updated: 07/26/24 1420     WBC 4.67 Thousand/uL      RBC 4.01 Million/uL      Hemoglobin 13.1 g/dL      Hematocrit 40.3 %       fL      MCH 32.7 pg      MCHC 32.5 g/dL      RDW 12.9 %      MPV 10.5 fL      Platelets 229 Thousands/uL      nRBC 0 /100 WBCs      Segmented % 56 %      Immature Grans % 1 %      Lymphocytes % 29 %      Monocytes % 9 %      Eosinophils Relative 4 %      Basophils Relative 1 %      Absolute Neutrophils 2.65 Thousands/µL      Absolute Immature Grans 0.03 Thousand/uL      Absolute Lymphocytes 1.34 Thousands/µL      Absolute Monocytes 0.41 Thousand/µL      Eosinophils Absolute 0.20 Thousand/µL      Basophils Absolute 0.04 Thousands/µL                    CT abdomen pelvis with contrast   Final Result by John Meza MD (07/26 1519)      No acute findings.      Severe chronic left hydronephrosis with diffuse renal cortical thinning. Left nephroureteral stent is in place.         Workstation performed: [id number]                    Procedures  Procedures         ED Course                                               Medical Decision Making  Amount and/or Complexity of Data Reviewed  Labs: ordered.  Radiology: ordered.    Risk  Prescription drug management.                 Disposition  Final diagnoses:   Abdominal pain, unspecified abdominal location     Time reflects when diagnosis was documented in  both MDM as applicable and the Disposition within this note       Time User Action Codes Description Comment    7/26/2024  3:56 PM Sharron Russell Add [N39.0] UTI (urinary tract infection)     7/26/2024  3:57 PM Sharron Russell Remove [N39.0] UTI (urinary tract infection)     7/26/2024  4:03 PM Sharron Russell Add [R10.9] Abdominal pain, unspecified abdominal location           ED Disposition       ED Disposition   Discharge    Condition   Stable    Date/Time   Fri Jul 26, 2024  3:56 PM    Comment   Katerinaalissa Reddy discharge to home/self care.                   Follow-up Information       Follow up With Specialties Details Why Contact Info    Jam Dow DO Family Medicine   [address]  [facility] [id number] [phone]      Ras Fisher MD Urology Schedule an appointment as soon as possible for a visit   [address] [facility] [address]  [facility] [id number] [phone]              Discharge Medication List as of 7/26/2024  4:04 PM        CONTINUE these medications which have NOT CHANGED    Details   !! acetaminophen (TYLENOL) 325 mg tablet Take 2 tablets (650 mg total) by mouth every 4 (four) hours as needed for mild pain, Starting Fri 6/23/2023, No Print      !! acetaminophen (TYLENOL) 325 mg tablet Take 2 tablets (650 mg total) by mouth every 4 (four) hours as needed for mild pain, Starting Fri 2/23/2024, No Print      amLODIPine (NORVASC) 5 mg tablet Take 5 mg by mouth 2 (two) times a day, Historical Med      diclofenac sodium (VOLTAREN) 50 mg EC tablet Take 1 tablet (50 mg total) by mouth 3 (three) times a day for 7 days, Starting Fri 2/23/2024, Until Fri 3/1/2024, Normal      docusate sodium (COLACE) 100 mg capsule Take 1 capsule (100 mg total) by mouth 2 (two) times a day for 15 days, Starting Fri 2/23/2024, Until Sat 3/9/2024, Normal      metoprolol succinate (TOPROL-XL) 25 mg 24 hr tablet Take 25 mg by mouth 2 (two) times a day , Starting Tue 2/20/2018, Historical Med       !! - Potential  duplicate medications found. Please discuss with provider.          No discharge procedures on file.    PDMP Review         Value Time User    PDMP Reviewed  Yes 11/23/2022  8:36 AM Sy Covarrubias DO            ED Provider  Electronically Signed by             Sharron Russell PA-C  07/27/24 0731

## 2024-08-01 ENCOUNTER — TELEPHONE (OUTPATIENT)
Age: 74
End: 2024-08-01

## 2024-08-01 NOTE — TELEPHONE ENCOUNTER
Spoke with pt regarding lower abdominal discomfort that pt has been experiencing. Pt was seen in ED on 7/26 and had urine testing and CT scan completed. Pt is requesting a call back from clinical to discuss if pt will need a stent exchange with Dr Fisher soon which may be the cause of her abdominal discomfort.    Pt call back- [phone]

## 2024-08-01 NOTE — TELEPHONE ENCOUNTER
Please have patient come into office to discuss symptoms and set up sooner stent exchange. Next few weeks. Thanks

## 2024-08-07 ENCOUNTER — OFFICE VISIT (OUTPATIENT)
Dept: UROLOGY | Facility: CLINIC | Age: 74
End: 2024-08-07
Payer: MEDICARE

## 2024-08-07 VITALS
HEIGHT: 64 IN | SYSTOLIC BLOOD PRESSURE: 178 MMHG | OXYGEN SATURATION: 98 % | BODY MASS INDEX: 25.61 KG/M2 | WEIGHT: 150 LBS | HEART RATE: 60 BPM | RESPIRATION RATE: 14 BRPM | DIASTOLIC BLOOD PRESSURE: 78 MMHG

## 2024-08-07 DIAGNOSIS — Q62.11 HYDRONEPHROSIS WITH URETEROPELVIC JUNCTION (UPJ) OBSTRUCTION: Primary | ICD-10-CM

## 2024-08-07 PROCEDURE — 99213 OFFICE O/P EST LOW 20 MIN: CPT | Performed by: PHYSICIAN ASSISTANT

## 2024-08-07 RX ORDER — SODIUM CHLORIDE, SODIUM LACTATE, POTASSIUM CHLORIDE, CALCIUM CHLORIDE 600; 310; 30; 20 MG/100ML; MG/100ML; MG/100ML; MG/100ML
125 INJECTION, SOLUTION INTRAVENOUS CONTINUOUS
OUTPATIENT
Start: 2024-08-07

## 2024-08-07 RX ORDER — CIPROFLOXACIN 2 MG/ML
400 INJECTION, SOLUTION INTRAVENOUS ONCE
OUTPATIENT
Start: 2024-08-07 | End: 2024-08-07

## 2024-08-07 NOTE — PROGRESS NOTES
UROLOGY PROGRESS NOTE   Patient Identifiers: Katerina Reddy (MRN [id number])  Date of Service: 8/7/2024    Subjective:   73-year-old female history of chronic UPJ obstruction.  She has been managed with nephroureteral stents requiring periodic change.  She was in the emergency room July 26 with possible urinary tract infection.  CT abdomen pelvis showed severe chronic left hydronephrosis with diffuse  renal cortical thinning and left nephroureteral stent in place.  Urine culture was mixed contaminants.  She feels better after her recent emergency room visit.  She did go 9 months with her last stent and would like to wait a little longer.  She has been managed with stents for over 10 years.  She reports never being symptomatic and her hydronephrosis was found incidentally when she had a bowel blockage.  I did suggest she talk to Dr. Fisher about giving her a trial of stent removal and see how she does.  In the meantime I will place a case request and she will get scheduled sometime in October.    Reason for visit: Chronic left hydronephrosis    Objective:     VITALS:    There were no vitals filed for this visit.        LABS:  Lab Results   Component Value Date    HGB 13.1 07/26/2024    HCT 40.3 07/26/2024    WBC 4.67 07/26/2024     07/26/2024   ]    Lab Results   Component Value Date    K 3.9 07/26/2024     07/26/2024    CO2 24 07/26/2024    BUN 15 07/26/2024    CREATININE 0.84 07/26/2024    CALCIUM 9.1 07/26/2024   ]        INPATIENT MEDS:    Current Outpatient Medications:     acetaminophen (TYLENOL) 325 mg tablet, Take 2 tablets (650 mg total) by mouth every 4 (four) hours as needed for mild pain, Disp: 30 tablet, Rfl: 0    acetaminophen (TYLENOL) 325 mg tablet, Take 2 tablets (650 mg total) by mouth every 4 (four) hours as needed for mild pain, Disp: , Rfl:     amLODIPine (NORVASC) 5 mg tablet, Take 5 mg by mouth 2 (two) times a day, Disp: , Rfl:     diclofenac sodium (VOLTAREN) 50 mg EC tablet,  Take 1 tablet (50 mg total) by mouth 3 (three) times a day for 7 days, Disp: 21 tablet, Rfl: 0    docusate sodium (COLACE) 100 mg capsule, Take 1 capsule (100 mg total) by mouth 2 (two) times a day for 15 days, Disp: 30 capsule, Rfl: 0    metoprolol succinate (TOPROL-XL) 25 mg 24 hr tablet, Take 25 mg by mouth 2 (two) times a day , Disp: , Rfl:       Physical Exam:   There were no vitals taken for this visit.  GEN: no acute distress    RESP: breathing comfortably with no accessory muscle use    ABD: soft, non-tender, non-distended   INCISION:    EXT: no significant peripheral edema       RADIOLOGY:   IMPRESSION:     No acute findings.     Severe chronic left hydronephrosis with diffuse renal cortical thinning. Left nephroureteral stent is in place.     Assessment:   1.  Severe chronic left hydronephrosis    Plan:   -She feels better now after being in the emergency room would like to wait on her stent change  -Consent is signed in case requested surgery scheduler notified  -Will look for sometime in October  -

## 2024-08-16 ENCOUNTER — TELEPHONE (OUTPATIENT)
Dept: UROLOGY | Facility: AMBULATORY SURGERY CENTER | Age: 74
End: 2024-08-16

## 2024-08-16 ENCOUNTER — PREP FOR PROCEDURE (OUTPATIENT)
Dept: UROLOGY | Facility: AMBULATORY SURGERY CENTER | Age: 74
End: 2024-08-16

## 2024-08-16 DIAGNOSIS — R39.89 SUSPECTED UTI: ICD-10-CM

## 2024-08-16 DIAGNOSIS — Z01.810 PREOP CARDIOVASCULAR EXAM: ICD-10-CM

## 2024-08-16 DIAGNOSIS — Q62.11 HYDRONEPHROSIS WITH URETEROPELVIC JUNCTION (UPJ) OBSTRUCTION: Primary | ICD-10-CM

## 2024-08-16 NOTE — TELEPHONE ENCOUNTER
I called pt to discuss scheduling her next stent exchange with Dr. Fisher at the [facility] on 10/18/24. There was no answer so I did leave a voicemail asking pt to call our office back next week to discuss.

## 2024-08-16 NOTE — TELEPHONE ENCOUNTER
Spoke with patient and confirmed surgery date of: 10/18/2024  Type of surgery: Cysto/L. RGP/ L. Stent exchange  Operating physician: Dr. Fisher  Location of surgery: [facility]    Verbally went over prep with patient on: 8/16/2024  NPO  Bowel prep? No  Hospital calls afternoon prior with arrival time -Calls Friday afternoon for Monday surgeries  Patient needs ride to and from surgery outpatient  Pre-op testing to be done 2 weeks prior to surgery  Urine culture and EKG  Blood thinners:   NONE  Clearances needed: NONE    Mailed/emailed to patient on:  Copy of packet scanned into Media on:  Labs in packet  Soap / Bowel prep in packet  Pre-op & Post-op in packet  Dates of H&P and post-op if needed    Consent: in Media

## 2024-11-08 ENCOUNTER — APPOINTMENT (OUTPATIENT)
Dept: LAB | Facility: AMBULARY SURGERY CENTER | Age: 74
End: 2024-11-08
Payer: MEDICARE

## 2024-11-08 ENCOUNTER — ANESTHESIA EVENT (OUTPATIENT)
Dept: PERIOP | Facility: AMBULARY SURGERY CENTER | Age: 74
End: 2024-11-08
Payer: MEDICARE

## 2024-11-08 DIAGNOSIS — Q62.11 HYDRONEPHROSIS WITH URETEROPELVIC JUNCTION (UPJ) OBSTRUCTION: ICD-10-CM

## 2024-11-08 DIAGNOSIS — Z01.810 PREOP CARDIOVASCULAR EXAM: ICD-10-CM

## 2024-11-08 DIAGNOSIS — R39.89 SUSPECTED UTI: ICD-10-CM

## 2024-11-08 PROCEDURE — 87086 URINE CULTURE/COLONY COUNT: CPT

## 2024-11-08 PROCEDURE — 93005 ELECTROCARDIOGRAM TRACING: CPT

## 2024-11-09 LAB — BACTERIA UR CULT: NORMAL

## 2024-11-10 LAB
ATRIAL RATE: 59 BPM
P AXIS: 48 DEGREES
PR INTERVAL: 142 MS
QRS AXIS: 39 DEGREES
QRSD INTERVAL: 134 MS
QT INTERVAL: 454 MS
QTC INTERVAL: 450 MS
T WAVE AXIS: 17 DEGREES
VENTRICULAR RATE: 59 BPM

## 2024-11-10 PROCEDURE — 93010 ELECTROCARDIOGRAM REPORT: CPT | Performed by: INTERNAL MEDICINE

## 2024-11-15 RX ORDER — CALCIUM CARBONATE 500 MG/1
1 TABLET, CHEWABLE ORAL DAILY
COMMUNITY

## 2024-11-15 RX ORDER — IBUPROFEN 200 MG
TABLET ORAL EVERY 6 HOURS PRN
COMMUNITY

## 2024-11-15 NOTE — PRE-PROCEDURE INSTRUCTIONS
Pre-Surgery Instructions:   Medication Instructions    acetaminophen (TYLENOL) 325 mg tablet Uses PRN- OK to take day of surgery    amLODIPine (NORVASC) 5 mg tablet Take day of surgery.    calcium carbonate (TUMS) 500 mg chewable tablet Stop taking 7 days prior to surgery.    ibuprofen (MOTRIN) 200 mg tablet Stop taking 7 days prior to surgery.    metoprolol succinate (TOPROL-XL) 25 mg 24 hr tablet Take day of surgery.    Medication instructions for day surgery reviewed. Please use only a sip of water to take your instructed medications. Avoid aspirin and all over the counter vitamins, supplements and NSAIDS for one week prior to surgery per anesthesia guidelines. Tylenol is ok to take as needed.     You will receive a call one business day prior to surgery with an arrival time and hospital directions. If your surgery is scheduled on a Monday, the hospital will be calling you on the Friday prior to your surgery. If you have not heard from anyone by 8pm, please call the hospital supervisor through the hospital  at [phone].     Do not eat or drink anything after midnight the night before your surgery, including candy, mints, lifesavers, or chewing gum. Do not drink alcohol 24hrs before your surgery. Try not to smoke at least 24hrs before your surgery.       Follow the pre surgery showering instructions as listed in the “My Surgical Experience Booklet” or otherwise provided by your surgeon's office. Do not use a blade to shave the surgical area 1 week before surgery. It is okay to use a clean electric clippers up to 24 hours before surgery. Do not apply any lotions, creams, including makeup, cologne, deodorant, or perfumes after showering on the day of your surgery. Do not use dry shampoo, hair spray, hair gel, or any type of hair products.     No contact lenses, eye make-up, or artificial eyelashes. Remove nail polish, including gel polish, and any artificial, gel, or acrylic nails if possible. Remove all  jewelry including rings and body piercing jewelry.     Wear causal clothing that is easy to take on and off. Consider your type of surgery.    Keep any valuables, jewelry, piercings at home. Please bring any specially ordered equipment (sling, braces) if indicated.    Arrange for a responsible person to drive you to and from the hospital on the day of your surgery. Please confirm the visitor policy for the day of your procedure when you receive your phone call with an arrival time.     Call the surgeon's office with any new illnesses, exposures, or additional questions prior to surgery.    Please reference your “My Surgical Experience Booklet” for additional information to prepare for your upcoming surgery.

## 2024-11-22 ENCOUNTER — ANESTHESIA (OUTPATIENT)
Dept: PERIOP | Facility: AMBULARY SURGERY CENTER | Age: 74
End: 2024-11-22
Payer: MEDICARE

## 2024-11-22 ENCOUNTER — HOSPITAL ENCOUNTER (OUTPATIENT)
Facility: AMBULARY SURGERY CENTER | Age: 74
Setting detail: OUTPATIENT SURGERY
Discharge: HOME | End: 2024-11-22
Attending: UROLOGY | Admitting: UROLOGY
Payer: MEDICARE

## 2024-11-22 ENCOUNTER — APPOINTMENT (OUTPATIENT)
Dept: RADIOLOGY | Facility: AMBULARY SURGERY CENTER | Age: 74
End: 2024-11-22
Payer: MEDICARE

## 2024-11-22 VITALS
OXYGEN SATURATION: 98 % | TEMPERATURE: 97.4 F | WEIGHT: 150 LBS | BODY MASS INDEX: 25.61 KG/M2 | DIASTOLIC BLOOD PRESSURE: 57 MMHG | SYSTOLIC BLOOD PRESSURE: 124 MMHG | HEART RATE: 58 BPM | RESPIRATION RATE: 18 BRPM | HEIGHT: 64 IN

## 2024-11-22 PROCEDURE — 74420 UROGRAPHY RTRGR +-KUB: CPT

## 2024-11-22 PROCEDURE — 52332 CYSTOSCOPY AND TREATMENT: CPT | Performed by: UROLOGY

## 2024-11-22 PROCEDURE — C1769 GUIDE WIRE: HCPCS | Performed by: UROLOGY

## 2024-11-22 PROCEDURE — NC001 PR NO CHARGE: Performed by: UROLOGY

## 2024-11-22 PROCEDURE — C2625 STENT, NON-COR, TEM W/DEL SY: HCPCS | Performed by: UROLOGY

## 2024-11-22 PROCEDURE — C1758 CATHETER, URETERAL: HCPCS | Performed by: UROLOGY

## 2024-11-22 DEVICE — STENT URETERAL 6FR 22CM INLAY OPTIMA W/NITINOL GDWR: Type: IMPLANTABLE DEVICE | Status: FUNCTIONAL

## 2024-11-22 RX ORDER — FENTANYL CITRATE 50 UG/ML
INJECTION, SOLUTION INTRAMUSCULAR; INTRAVENOUS AS NEEDED
Status: DISCONTINUED | OUTPATIENT
Start: 2024-11-22 | End: 2024-11-22

## 2024-11-22 RX ORDER — MAGNESIUM HYDROXIDE 1200 MG/15ML
LIQUID ORAL AS NEEDED
Status: DISCONTINUED | OUTPATIENT
Start: 2024-11-22 | End: 2024-11-22 | Stop reason: HOSPADM

## 2024-11-22 RX ORDER — FENTANYL CITRATE/PF 50 MCG/ML
50 SYRINGE (ML) INJECTION
Status: DISCONTINUED | OUTPATIENT
Start: 2024-11-22 | End: 2024-11-22 | Stop reason: HOSPADM

## 2024-11-22 RX ORDER — SODIUM CHLORIDE, SODIUM LACTATE, POTASSIUM CHLORIDE, CALCIUM CHLORIDE 600; 310; 30; 20 MG/100ML; MG/100ML; MG/100ML; MG/100ML
125 INJECTION, SOLUTION INTRAVENOUS CONTINUOUS
Status: DISCONTINUED | OUTPATIENT
Start: 2024-11-22 | End: 2024-11-22 | Stop reason: HOSPADM

## 2024-11-22 RX ORDER — LIDOCAINE HYDROCHLORIDE 20 MG/ML
INJECTION, SOLUTION EPIDURAL; INFILTRATION; INTRACAUDAL; PERINEURAL AS NEEDED
Status: DISCONTINUED | OUTPATIENT
Start: 2024-11-22 | End: 2024-11-22

## 2024-11-22 RX ORDER — SODIUM CHLORIDE, SODIUM LACTATE, POTASSIUM CHLORIDE, CALCIUM CHLORIDE 600; 310; 30; 20 MG/100ML; MG/100ML; MG/100ML; MG/100ML
INJECTION, SOLUTION INTRAVENOUS CONTINUOUS PRN
Status: DISCONTINUED | OUTPATIENT
Start: 2024-11-22 | End: 2024-11-22

## 2024-11-22 RX ORDER — PROPOFOL 10 MG/ML
INJECTION, EMULSION INTRAVENOUS AS NEEDED
Status: DISCONTINUED | OUTPATIENT
Start: 2024-11-22 | End: 2024-11-22

## 2024-11-22 RX ORDER — SODIUM CHLORIDE, SODIUM LACTATE, POTASSIUM CHLORIDE, CALCIUM CHLORIDE 600; 310; 30; 20 MG/100ML; MG/100ML; MG/100ML; MG/100ML
20 INJECTION, SOLUTION INTRAVENOUS CONTINUOUS
Status: DISCONTINUED | OUTPATIENT
Start: 2024-11-22 | End: 2024-11-22 | Stop reason: HOSPADM

## 2024-11-22 RX ORDER — DEXAMETHASONE SODIUM PHOSPHATE 10 MG/ML
INJECTION, SOLUTION INTRAMUSCULAR; INTRAVENOUS AS NEEDED
Status: DISCONTINUED | OUTPATIENT
Start: 2024-11-22 | End: 2024-11-22

## 2024-11-22 RX ORDER — ONDANSETRON 2 MG/ML
INJECTION INTRAMUSCULAR; INTRAVENOUS AS NEEDED
Status: DISCONTINUED | OUTPATIENT
Start: 2024-11-22 | End: 2024-11-22

## 2024-11-22 RX ORDER — CIPROFLOXACIN 2 MG/ML
400 INJECTION, SOLUTION INTRAVENOUS ONCE
Status: COMPLETED | OUTPATIENT
Start: 2024-11-22 | End: 2024-11-22

## 2024-11-22 RX ADMIN — SODIUM CHLORIDE, SODIUM LACTATE, POTASSIUM CHLORIDE, AND CALCIUM CHLORIDE: .6; .31; .03; .02 INJECTION, SOLUTION INTRAVENOUS at 07:20

## 2024-11-22 RX ADMIN — CIPROFLOXACIN: 2 INJECTION, SOLUTION INTRAVENOUS at 07:20

## 2024-11-22 RX ADMIN — DEXAMETHASONE SODIUM PHOSPHATE 5 MG: 10 INJECTION INTRAMUSCULAR; INTRAVENOUS at 07:30

## 2024-11-22 RX ADMIN — FENTANYL CITRATE 25 MCG: 50 INJECTION INTRAMUSCULAR; INTRAVENOUS at 07:25

## 2024-11-22 RX ADMIN — FENTANYL CITRATE 25 MCG: 50 INJECTION INTRAMUSCULAR; INTRAVENOUS at 07:35

## 2024-11-22 RX ADMIN — FENTANYL CITRATE 25 MCG: 50 INJECTION INTRAMUSCULAR; INTRAVENOUS at 07:28

## 2024-11-22 RX ADMIN — ONDANSETRON 4 MG: 2 INJECTION, SOLUTION INTRAMUSCULAR; INTRAVENOUS at 07:30

## 2024-11-22 RX ADMIN — PROPOFOL 180 MG: 10 INJECTION, EMULSION INTRAVENOUS at 07:28

## 2024-11-22 RX ADMIN — FENTANYL CITRATE 25 MCG: 50 INJECTION INTRAMUSCULAR; INTRAVENOUS at 07:31

## 2024-11-22 RX ADMIN — LIDOCAINE HYDROCHLORIDE 60 MG: 20 INJECTION, SOLUTION EPIDURAL; INFILTRATION; INTRACAUDAL at 07:28

## 2024-11-22 NOTE — H&P
UROLOGY HISTORY AND PHYSICAL     Patient Identifiers: Katerina Reddy (MRN [id number])      Date of Service: 11/22/2024        ASSESSMENT:     73 y.o. old female with phonic left UPJ obstruction managed with stent exchanges since 2021.          PLAN:     Cystoscopy left ureteroscopy ureteral stent exchange      History of Present Illness:     Katerina Reddy is a 73 y.o. old with a history of left UPJ obstruction    Past Medical, Past Surgical History:     Past Medical History:   Diagnosis Date    Bowel obstruction (HCC)     Chronic kidney disease     GERD (gastroesophageal reflux disease)     Hernia of abdominal cavity     Hypertension     Lyme disease    :    Past Surgical History:   Procedure Laterality Date    ABDOMINAL SURGERY      SCAR TISSUE AROUND BOWEL RESECTED    CHOLECYSTECTOMY      FL RETROGRADE PYELOGRAM  10/21/2021    FL RETROGRADE PYELOGRAM  02/25/2022    FL RETROGRADE PYELOGRAM  08/22/2022    FL RETROGRADE PYELOGRAM  11/21/2022    FL RETROGRADE PYELOGRAM  02/10/2023    FL RETROGRADE PYELOGRAM  6/23/2023    FL RETROGRADE PYELOGRAM  2/23/2024    HYSTERECTOMY      WI CYSTO BLADDER W/URETERAL CATHETERIZATION Bilateral 10/21/2021    Procedure: CYSTOSCOPY RETROGRADE PYELOGRAM WITH INSERTION STENT URETERAL;  Surgeon: Ras Fisher MD;  [facility];  Service: Urology    WI CYSTO BLADDER W/URETERAL CATHETERIZATION Left 02/25/2022    Procedure: CYSTOSCOPY RETROGRADE PYELOGRAM WITH INSERTION STENT URETERAL; DIAGNOSTIC URETEROSCOPY;  Surgeon: Ras Fisher MD;  [facility];  Service: Urology    WI CYSTO BLADDER W/URETERAL CATHETERIZATION Left 08/22/2022    Procedure: CYSTOSCOPY RETROGRADE PYELOGRAM WITH EXCHANGE STENT URETERAL;  Surgeon: Vik Young MD;  [facility];  Service: Urology    WI CYSTO BLADDER W/URETERAL CATHETERIZATION Left 11/21/2022    Procedure: CYSTOSCOPY RETROGRADE PYELOGRAM WITH INSERTION STENT URETERAL;  Surgeon: Ras Fisher MD;   [facility];  Service: Urology    WA CYSTO BLADDER W/URETERAL CATHETERIZATION Left 2023    Procedure: CYSTOSCOPY RETROGRADE PYELOGRAM WITH exchange STENT URETERAL;  Surgeon: Ras Fisher MD;  [facility];  Service: Urology    WA CYSTO BLADDER W/URETERAL CATHETERIZATION Left 2024    Procedure: CYSTOSCOPY RETROGRADE PYELOGRAM WITH EXCHANGE STENT URETERAL;  Surgeon: Ras Fisher MD;  [facility];  Service: Urology    WA CYSTO W/INSERT URETERAL STENT Left 02/10/2023    Procedure: EXCHANGE STENT URETERAL;  Surgeon: Ras Fisher MD;  [facility];  Service: Urology    WA CYSTO W/INSERT URETERAL STENT Left 2023    Procedure: EXCHANGE STENT URETERAL;  Surgeon: Ras Fisher MD;  [facility];  Service: Urology   :    Medications, Allergies:     Current Facility-Administered Medications:     ciprofloxacin (CIPRO) IVPB (premix in 5% dextrose) 400 mg 200 mL, 400 mg, Intravenous, Once, John Covarrubias PA-C    lactated ringers infusion, 125 mL/hr, Intravenous, Continuous, John Covarrubias PA-C    Allergies:  Allergies   Allergen Reactions    Bactrim [Sulfamethoxazole-Trimethoprim] Other (See Comments)     Her mom  from [place], Family histor?    Sulfa Antibiotics Other (See Comments)     Causes sores on skin - per pt    Cefixime Other (See Comments)     30 years ago, unclear reaction. Believes allergy listed due to C Dif  Tolerated Ceftriaxone 10/21   :    Social and Family History:   Social History:   Social History     Tobacco Use    Smoking status: Never    Smokeless tobacco: Never   Vaping Use    Vaping status: Never Used   Substance Use Topics    Alcohol use: Yes     Comment: socially    Drug use: No   .    Social History     Tobacco Use   Smoking Status Never   Smokeless Tobacco Never       Family History:  Family History   Problem Relation Age of Onset    Stroke Father    :     Review of Systems:  "    General: Fever, chills, or night sweats: negative  Cardiac: Negative for chest pain.    Pulmonary: Negative for shortness of breath.  Gastrointestinal: Abdominal pain negative  Nausea, vomiting, or diarrhea negative  Genitourinary: See HPI above.  Patient does nothave hematuria.  All other systems queried were negative.    Physical Exam:   General: Patient is pleasant and in NAD. Awake and alert  /72   Pulse (!) 54   Temp (!) 97.3 °F (36.3 °C) (Temporal)   Resp 16   Ht 5' 4\" (1.626 m)   Wt 68 kg (150 lb)   SpO2 97%   BMI 25.75 kg/m²   HEENT:  Normocephalic atraumatic  Cardiac:  Regular rate and rhythm, Peripheral edema: negative  Pulmonary: Non-labored breathing, CTAB  Abdomen: Soft, non-tender, non-distended.  No surgical scars.  No masses, tenderness, hernias noted.    Genitourinary: negative CVA tenderness, neg suprapubic tenderness.  Extremities: normal movement in all 4       Labs:     Lab Results   Component Value Date    HGB 13.1 07/26/2024    HCT 40.3 07/26/2024    WBC 4.67 07/26/2024     07/26/2024   ]    Lab Results   Component Value Date    K 3.9 07/26/2024     07/26/2024    CO2 24 07/26/2024    BUN 15 07/26/2024    CREATININE 0.84 07/26/2024    CALCIUM 9.1 07/26/2024   ]    Imaging:   I personally reviewed the images and report of the following studies, and reviewed them with the patient:        Thank you for allowing me to participate in this patients’ care.  Please do not hesitate to call with any additional questions.  Ras Fisher MD      "

## 2024-11-22 NOTE — ANESTHESIA PREPROCEDURE EVALUATION
Procedure:  CYSTOSCOPY RETROGRADE PYELOGRAM WITH INSERTION STENT URETERAL (Left: Bladder)  EXCHANGE STENT URETERAL (Left: Ureter)    Relevant Problems   CARDIO   (+) Essential hypertension   (+) Nonrheumatic aortic valve insufficiency   (+) SVT (supraventricular tachycardia) (HCC)      GI/HEPATIC   (+) GERD (gastroesophageal reflux disease)      /RENAL   (+) Stage 2 chronic kidney disease      GYN   (+) Adenocarcinoma of endometrium, stage 3 (HCC)   (+) Uterine cancer (HCC)        Physical Exam    Airway    Mallampati score: I  TM Distance: >3 FB  Neck ROM: full     Dental        Cardiovascular  Cardiovascular exam normal    Pulmonary  Pulmonary exam normal     Other Findings  post-pubertal.      Anesthesia Plan  ASA Score- 2     Anesthesia Type- general with ASA Monitors.         Additional Monitors:     Airway Plan: LMA.           Plan Factors-Exercise tolerance (METS): >4 METS.    Chart reviewed. EKG reviewed.  Existing labs reviewed. Patient summary reviewed.                  Induction- intravenous.    Postoperative Plan- Plan for postoperative opioid use. Planned trial extubation    Perioperative Resuscitation Plan - Level 1 - Full Code.       Informed Consent- Anesthetic plan and risks discussed with patient.  I personally reviewed this patient with the CRNA. Discussed and agreed on the Anesthesia Plan with the CRNA..

## 2024-11-22 NOTE — ANESTHESIA POSTPROCEDURE EVALUATION
Post-Op Assessment Note            No anethesia notable event occurred.    Staff: Anesthesiologist           Last Filed PACU Vitals:  Vitals Value Taken Time   Temp 97.4 °F (36.3 °C) 11/22/24 0750   Pulse 51 11/22/24 0806   /66 11/22/24 0801   Resp 27 11/22/24 0806   SpO2 98 % 11/22/24 0806   Vitals shown include unfiled device data.    Modified Leif:  Activity: 2 (11/22/2024  8:06 AM)  Respiration: 2 (11/22/2024  8:06 AM)  Circulation: 2 (11/22/2024  8:06 AM)  Consciousness: 2 (11/22/2024  8:06 AM)  Oxygen Saturation: 2 (11/22/2024  8:06 AM)  Modified Leif Score: 10 (11/22/2024  8:06 AM)

## 2024-11-22 NOTE — ANESTHESIA POSTPROCEDURE EVALUATION
Post-Op Assessment Note    CV Status:  Stable    Pain management: adequate       Mental Status:  Sleepy   Hydration Status:  Euvolemic   PONV Controlled:  Controlled   Airway Patency:  Patent     Post Op Vitals Reviewed: Yes    No anethesia notable event occurred.    Staff: CRNA           Last Filed PACU Vitals:  Vitals Value Taken Time   Temp     Pulse 51 11/22/24 0750   /56    Resp 16    SpO2 98 % 11/22/24 0750   Vitals shown include unfiled device data.    Modified Leif:  No data recorded

## 2024-11-22 NOTE — OP NOTE
Operative Note     PATIENT:  Katerina Reddy (MRN [id number])    DATE OF PROCEDURE:   11/22/2024    PRE-OP DIAGNOSIS:   1) chronic left UPJ obstruction  2) severe hydronephrosis  3) indwelling left ureteral stent    POST-OP DIAGNOSIS:   1) chronic left UPJ obstruction  2) severe hydronephrosis  3) indwelling left ureteral stent    PROCEDURES PERFORMED:  1) Cystoscopy  2) Left retrograde pyelography with fluoroscopic interpretation  3) Left ureteral stent placement    SURGEON:  Ras Fisher MD    ASSISTANTS:  There were no qualified teaching residents to assist with this case    ANESTHESIA: General     COMPLICATIONS:   None    ANTIBIOTICS:  Ancef    INTRAOPERATIVE THROMBOEMBOLISM PROPHYLAXIS:  Pneumatic compression stockings      FINDINGS:  Successful stent exchange.  Severe hydronephrosis with blunting of the calices      INDICATIONS FOR PROCEDURE:  Katerina Reddy is an 73 y.o. old female with left UPJ obstruction.  She has been managed for some time with chronic ureteral stent exchange..  After discussing the options, the patient elected to undergo ureteral stent exchange.  We discussed the procedure in detail, the alternatives, and the risks, and they signed informed consent to proceed.    PROCEDURE IN DETAIL:   The patient was identified and brought to the OR.  Antibiotic prophylaxis and DVT prophylaxis were administered.  They were placed in the comfortable dorsal lithotomy position with care to pad all pressure points.  They were prepped and draped in the usual sterile fashion using hibiclens.  A surgical time out was performed with all in the room in agreement with the correct patient, procedure, indications, and laterality.  A 21-Togol[place] rigid cystoscope was used to enter the bladder.  The bladder was inspected in its entirety and there were no lesions noted.  The ureteral orifices were identified in their normal orthotopic positions.     The Left ureteral orifice was identified and a 5 Fr open ended  catheter was placed into the ureteral orifice.    A retrograde pyelogram was performed with injection of 50/50 Isovue with the findings as described above.  A Sensor wire was up to the kidney under fluoroscopic guidance.  The open-ended catheter is navigated atop the pre-placed wire and advanced to the renal pelvis.       the distance between the UVJ and the UPJ was measured and appropriately sized stent was selected.  The JJ stent was then passed up the wire  under fluoroscopic guidance into the  kidney with a good curl noted in the kidney and in the bladder.   The bladder was drained.        The patient was placed back supine, awakened from general anesthesia and brought to recovery room in stable condition.    SPECIMENS:   * No orders in the log *     IMPLANTS:   Implant Name Type Inv. Item Serial No.  Lot No. LRB No. Used Action   STENT URETERAL 6FR 22CM INLAY OPTIMA W/NITINOL GDWR - [id number]  STENT URETERAL 6FR 22CM INLAY OPTIMA W/NITINOL GDWR  [employer] [id number] Left 1 Implanted        COMPLICATIONS: None    DISPOSITION: PACU    PLAN: We will plan her next ureteral stent exchange in 9-10 months time

## 2024-11-22 NOTE — DISCHARGE INSTR - AVS FIRST PAGE
Surgery went great.  Stent was exchanged successfully.  Kidney he continues to be very continues to show severe hydronephrosis/enlargement however I do not see any signs of acute infection.  Please call if you develop symptoms of urinary tract infection, otherwise we will plan for your next exchange in 10 months.    Ras Fisher MD,PhD  [facility]  [phone]

## 2025-05-19 ENCOUNTER — PREP FOR PROCEDURE (OUTPATIENT)
Dept: UROLOGY | Facility: AMBULATORY SURGERY CENTER | Age: 75
End: 2025-05-19

## 2025-05-19 DIAGNOSIS — Q62.11 HYDRONEPHROSIS WITH URETEROPELVIC JUNCTION (UPJ) OBSTRUCTION: Primary | ICD-10-CM

## 2025-05-19 DIAGNOSIS — Z01.810 PRE-OPERATIVE CARDIOVASCULAR EXAMINATION: ICD-10-CM

## 2025-05-19 DIAGNOSIS — R39.89 SUSPECTED UTI: ICD-10-CM

## 2025-07-25 ENCOUNTER — TELEPHONE (OUTPATIENT)
Age: 75
End: 2025-07-25

## 2025-08-05 ENCOUNTER — OFFICE VISIT (OUTPATIENT)
Dept: UROLOGY | Facility: CLINIC | Age: 75
End: 2025-08-05
Payer: MEDICARE

## 2025-08-05 VITALS
OXYGEN SATURATION: 98 % | WEIGHT: 147 LBS | BODY MASS INDEX: 25.1 KG/M2 | SYSTOLIC BLOOD PRESSURE: 112 MMHG | HEART RATE: 67 BPM | HEIGHT: 64 IN | DIASTOLIC BLOOD PRESSURE: 74 MMHG

## 2025-08-05 DIAGNOSIS — Q62.11 HYDRONEPHROSIS WITH URETEROPELVIC JUNCTION (UPJ) OBSTRUCTION: Primary | ICD-10-CM

## 2025-08-05 PROCEDURE — 99213 OFFICE O/P EST LOW 20 MIN: CPT | Performed by: PHYSICIAN ASSISTANT

## 2025-08-08 ENCOUNTER — ANESTHESIA (OUTPATIENT)
Dept: ANESTHESIOLOGY | Facility: HOSPITAL | Age: 75
End: 2025-08-08

## 2025-08-08 ENCOUNTER — ANESTHESIA EVENT (OUTPATIENT)
Dept: ANESTHESIOLOGY | Facility: HOSPITAL | Age: 75
End: 2025-08-08

## 2025-08-08 ENCOUNTER — LAB (OUTPATIENT)
Dept: LAB | Facility: AMBULARY SURGERY CENTER | Age: 75
End: 2025-08-08
Attending: UROLOGY
Payer: MEDICARE

## 2025-08-08 DIAGNOSIS — R39.89 SUSPECTED UTI: ICD-10-CM

## 2025-08-08 DIAGNOSIS — Q62.11 HYDRONEPHROSIS WITH URETEROPELVIC JUNCTION (UPJ) OBSTRUCTION: ICD-10-CM

## 2025-08-08 LAB
ATRIAL RATE: 49 BPM
P AXIS: 51 DEGREES
PR INTERVAL: 150 MS
QRS AXIS: 44 DEGREES
QRSD INTERVAL: 134 MS
QT INTERVAL: 456 MS
QTC INTERVAL: 412 MS
T WAVE AXIS: 20 DEGREES
VENTRICULAR RATE: 49 BPM

## 2025-08-08 PROCEDURE — 93005 ELECTROCARDIOGRAM TRACING: CPT

## 2025-08-08 PROCEDURE — 87086 URINE CULTURE/COLONY COUNT: CPT

## 2025-08-08 PROCEDURE — 93010 ELECTROCARDIOGRAM REPORT: CPT | Performed by: INTERNAL MEDICINE

## 2025-08-09 LAB — BACTERIA UR CULT: NORMAL

## 2025-08-10 ENCOUNTER — HOSPITAL ENCOUNTER (EMERGENCY)
Facility: HOSPITAL | Age: 75
Discharge: HOME | End: 2025-08-10
Attending: EMERGENCY MEDICINE
Payer: MEDICARE

## 2025-08-11 ENCOUNTER — RESULTS FOLLOW-UP (OUTPATIENT)
Dept: EMERGENCY DEPT | Facility: HOSPITAL | Age: 75
End: 2025-08-11

## 2025-08-12 ENCOUNTER — TELEPHONE (OUTPATIENT)
Age: 75
End: 2025-08-12

## 2025-08-18 ENCOUNTER — HOSPITAL ENCOUNTER (OUTPATIENT)
Dept: CT IMAGING | Facility: HOSPITAL | Age: 75
Discharge: HOME | End: 2025-08-18
Attending: PHYSICIAN ASSISTANT
Payer: MEDICARE

## 2025-08-18 ENCOUNTER — APPOINTMENT (OUTPATIENT)
Dept: LAB | Facility: HOSPITAL | Age: 75
End: 2025-08-18
Attending: PHYSICIAN ASSISTANT
Payer: MEDICARE

## 2025-08-18 DIAGNOSIS — Q62.11 HYDRONEPHROSIS WITH URETEROPELVIC JUNCTION (UPJ) OBSTRUCTION: Primary | ICD-10-CM

## 2025-08-18 DIAGNOSIS — N39.0 URINARY TRACT INFECTION WITHOUT HEMATURIA, SITE UNSPECIFIED: Primary | ICD-10-CM

## 2025-08-18 DIAGNOSIS — Q62.11 HYDRONEPHROSIS WITH URETEROPELVIC JUNCTION (UPJ) OBSTRUCTION: ICD-10-CM

## 2025-08-18 DIAGNOSIS — N39.0 URINARY TRACT INFECTION WITHOUT HEMATURIA, SITE UNSPECIFIED: ICD-10-CM

## 2025-08-18 PROCEDURE — 87086 URINE CULTURE/COLONY COUNT: CPT

## 2025-08-18 PROCEDURE — 87186 SC STD MICRODIL/AGAR DIL: CPT

## 2025-08-18 PROCEDURE — 74176 CT ABD & PELVIS W/O CONTRAST: CPT

## 2025-08-18 PROCEDURE — 87077 CULTURE AEROBIC IDENTIFY: CPT

## 2025-08-19 ENCOUNTER — ANESTHESIA EVENT (OUTPATIENT)
Dept: PERIOP | Facility: AMBULARY SURGERY CENTER | Age: 75
End: 2025-08-19
Payer: MEDICARE

## 2025-08-20 ENCOUNTER — TELEPHONE (OUTPATIENT)
Dept: UROLOGY | Facility: CLINIC | Age: 75
End: 2025-08-20

## 2025-08-20 DIAGNOSIS — N30.00 ACUTE CYSTITIS WITHOUT HEMATURIA: Primary | ICD-10-CM

## 2025-08-20 DIAGNOSIS — N39.0 UTI (URINARY TRACT INFECTION): ICD-10-CM

## 2025-08-20 LAB
BACTERIA UR CULT: ABNORMAL
BACTERIA UR CULT: ABNORMAL

## 2025-08-20 RX ORDER — LEVOFLOXACIN 500 MG/1
500 TABLET, FILM COATED ORAL EVERY 24 HOURS
Qty: 5 TABLET | Refills: 0 | Status: SHIPPED | OUTPATIENT
Start: 2025-08-20 | End: 2025-08-25

## 2025-08-20 RX ORDER — NITROFURANTOIN 25; 75 MG/1; MG/1
100 CAPSULE ORAL 2 TIMES DAILY
Qty: 14 CAPSULE | Refills: 0 | Status: SHIPPED | OUTPATIENT
Start: 2025-08-20 | End: 2025-08-27

## 2025-08-22 ENCOUNTER — ANESTHESIA (OUTPATIENT)
Dept: PERIOP | Facility: AMBULARY SURGERY CENTER | Age: 75
End: 2025-08-22
Payer: MEDICARE

## 2025-08-22 RX ORDER — SODIUM CHLORIDE, SODIUM LACTATE, POTASSIUM CHLORIDE, CALCIUM CHLORIDE 600; 310; 30; 20 MG/100ML; MG/100ML; MG/100ML; MG/100ML
INJECTION, SOLUTION INTRAVENOUS CONTINUOUS PRN
Status: DISCONTINUED | OUTPATIENT
Start: 2025-08-22 | End: 2025-08-22

## 2025-08-22 RX ORDER — ONDANSETRON 2 MG/ML
INJECTION INTRAMUSCULAR; INTRAVENOUS AS NEEDED
Status: DISCONTINUED | OUTPATIENT
Start: 2025-08-22 | End: 2025-08-22

## 2025-08-22 RX ORDER — LIDOCAINE HYDROCHLORIDE 10 MG/ML
INJECTION, SOLUTION EPIDURAL; INFILTRATION; INTRACAUDAL; PERINEURAL AS NEEDED
Status: DISCONTINUED | OUTPATIENT
Start: 2025-08-22 | End: 2025-08-22

## 2025-08-22 RX ORDER — DEXAMETHASONE SODIUM PHOSPHATE 10 MG/ML
INJECTION, SOLUTION INTRAMUSCULAR; INTRAVENOUS AS NEEDED
Status: DISCONTINUED | OUTPATIENT
Start: 2025-08-22 | End: 2025-08-22

## 2025-08-22 RX ORDER — PROPOFOL 10 MG/ML
INJECTION, EMULSION INTRAVENOUS AS NEEDED
Status: DISCONTINUED | OUTPATIENT
Start: 2025-08-22 | End: 2025-08-22

## 2025-08-22 RX ORDER — FENTANYL CITRATE 50 UG/ML
INJECTION, SOLUTION INTRAMUSCULAR; INTRAVENOUS AS NEEDED
Status: DISCONTINUED | OUTPATIENT
Start: 2025-08-22 | End: 2025-08-22

## 2025-08-22 RX ADMIN — ONDANSETRON 4 MG: 2 INJECTION INTRAMUSCULAR; INTRAVENOUS at 07:52

## 2025-08-22 RX ADMIN — LEVOFLOXACIN: 750 INJECTION, SOLUTION INTRAVENOUS at 07:35

## 2025-08-22 RX ADMIN — FENTANYL CITRATE 50 MCG: 50 INJECTION INTRAMUSCULAR; INTRAVENOUS at 07:43

## 2025-08-22 RX ADMIN — LIDOCAINE HYDROCHLORIDE 50 MG: 10 INJECTION, SOLUTION EPIDURAL; INFILTRATION; INTRACAUDAL at 07:33

## 2025-08-22 RX ADMIN — SODIUM CHLORIDE, SODIUM LACTATE, POTASSIUM CHLORIDE, AND CALCIUM CHLORIDE: .6; .31; .03; .02 INJECTION, SOLUTION INTRAVENOUS at 07:25

## 2025-08-22 RX ADMIN — FENTANYL CITRATE 25 MCG: 50 INJECTION INTRAMUSCULAR; INTRAVENOUS at 08:10

## 2025-08-22 RX ADMIN — DEXAMETHASONE SODIUM PHOSPHATE 10 MG: 10 INJECTION INTRAMUSCULAR; INTRAVENOUS at 07:40

## 2025-08-22 RX ADMIN — PROPOFOL 120 MG: 10 INJECTION, EMULSION INTRAVENOUS at 07:33

## 2025-08-22 RX ADMIN — FENTANYL CITRATE 25 MCG: 50 INJECTION INTRAMUSCULAR; INTRAVENOUS at 08:00

## 2025-08-22 RX ADMIN — PROPOFOL 30 MG: 10 INJECTION, EMULSION INTRAVENOUS at 07:34

## (undated) DEVICE — CATH URET .038 10FR 50CM DUAL LUMEN

## (undated) DEVICE — CHLORHEXIDINE 4PCT 4 OZ

## (undated) DEVICE — UROLOGIC DRAIN BAG: Brand: UNBRANDED

## (undated) DEVICE — UROCATCH BAG

## (undated) DEVICE — STERILE LUBRICATING JELLY, TUBE: Brand: HR LUBRICATING JELLY

## (undated) DEVICE — GLOVE SRG BIOGEL 7

## (undated) DEVICE — GLOVE SRG BIOGEL ECLIPSE 7.5

## (undated) DEVICE — PACK TUR

## (undated) DEVICE — GUIDEWIRE STRGHT TIP 0.035 IN  SOLO PLUS

## (undated) DEVICE — PREMIUM DRY TRAY LF: Brand: MEDLINE INDUSTRIES, INC.

## (undated) DEVICE — SPECIMEN CONTAINER STERILE PEEL PACK

## (undated) DEVICE — STERILE SURGICAL LUBRICANT,  TUBE: Brand: SURGILUBE

## (undated) DEVICE — CATH URETERAL 5FR X 70 CM FLEX TIP POLYUR BARD

## (undated) DEVICE — INVIEW CLEAR LEGGINGS: Brand: CONVERTORS

## (undated) DEVICE — SHEATH URETERAL ACCESS 12/14FR 35CM PROXIS

## (undated) DEVICE — GLOVE INDICATOR PI UNDERGLOVE SZ 8 BLUE